# Patient Record
Sex: FEMALE | Race: WHITE | NOT HISPANIC OR LATINO | Employment: FULL TIME | URBAN - NONMETROPOLITAN AREA
[De-identification: names, ages, dates, MRNs, and addresses within clinical notes are randomized per-mention and may not be internally consistent; named-entity substitution may affect disease eponyms.]

---

## 2017-01-03 DIAGNOSIS — R45.4 IRRITABILITY AND ANGER: Primary | ICD-10-CM

## 2017-01-04 RX ORDER — ESCITALOPRAM OXALATE 20 MG/1
TABLET ORAL
Qty: 30 TABLET | Refills: 1 | Status: SHIPPED | OUTPATIENT
Start: 2017-01-04 | End: 2017-04-17

## 2017-01-04 NOTE — TELEPHONE ENCOUNTER
Lexapro     Last Written Prescription Date: 11/15/2016  Last Fill Quantity: 30, # refills: 2  Last Office Visit with [facility] [place], [place] primary care provider:  9/8/2016        Last PHQ-9 score on record=   PHQ-9 SCORE 1/14/2016   Total Score 6

## 2017-01-24 ENCOUNTER — OFFICE VISIT (OUTPATIENT)
Dept: FAMILY MEDICINE | Facility: OTHER | Age: 42
End: 2017-01-24
Attending: FAMILY MEDICINE
Payer: COMMERCIAL

## 2017-01-24 VITALS
DIASTOLIC BLOOD PRESSURE: 88 MMHG | HEART RATE: 62 BPM | HEIGHT: 69 IN | TEMPERATURE: 98.6 F | OXYGEN SATURATION: 99 % | SYSTOLIC BLOOD PRESSURE: 158 MMHG | BODY MASS INDEX: 43.4 KG/M2 | WEIGHT: 293 LBS

## 2017-01-24 DIAGNOSIS — Z13.220 LIPID SCREENING: ICD-10-CM

## 2017-01-24 DIAGNOSIS — E55.9 VITAMIN D DEFICIENCY: ICD-10-CM

## 2017-01-24 DIAGNOSIS — E66.9 OBESITY, UNSPECIFIED OBESITY SEVERITY, UNSPECIFIED OBESITY TYPE: ICD-10-CM

## 2017-01-24 DIAGNOSIS — R05.9 COUGH: ICD-10-CM

## 2017-01-24 DIAGNOSIS — F33.9 EPISODE OF RECURRENT MAJOR DEPRESSIVE DISORDER, UNSPECIFIED DEPRESSION EPISODE SEVERITY (H): ICD-10-CM

## 2017-01-24 DIAGNOSIS — Z13.1 SCREENING FOR DIABETES MELLITUS: ICD-10-CM

## 2017-01-24 DIAGNOSIS — I10 BENIGN ESSENTIAL HYPERTENSION: Primary | ICD-10-CM

## 2017-01-24 LAB
ALBUMIN SERPL-MCNC: 3.5 G/DL (ref 3.4–5)
ALBUMIN UR-MCNC: NEGATIVE MG/DL
ALP SERPL-CCNC: 60 U/L (ref 40–150)
ALT SERPL W P-5'-P-CCNC: 19 U/L (ref 0–50)
ANION GAP SERPL CALCULATED.3IONS-SCNC: 4 MMOL/L (ref 3–14)
APPEARANCE UR: CLEAR
AST SERPL W P-5'-P-CCNC: 12 U/L (ref 0–45)
BILIRUB SERPL-MCNC: 0.3 MG/DL (ref 0.2–1.3)
BILIRUB UR QL STRIP: NEGATIVE
BUN SERPL-MCNC: 9 MG/DL (ref 7–30)
CALCIUM SERPL-MCNC: 8.7 MG/DL (ref 8.5–10.1)
CHLORIDE SERPL-SCNC: 107 MMOL/L (ref 94–109)
CO2 SERPL-SCNC: 26 MMOL/L (ref 20–32)
COLOR UR AUTO: NORMAL
CREAT SERPL-MCNC: 0.53 MG/DL (ref 0.52–1.04)
GFR SERPL CREATININE-BSD FRML MDRD: NORMAL ML/MIN/1.7M2
GLUCOSE SERPL-MCNC: 89 MG/DL (ref 70–99)
GLUCOSE UR STRIP-MCNC: NEGATIVE MG/DL
HGB UR QL STRIP: NEGATIVE
KETONES UR STRIP-MCNC: NEGATIVE MG/DL
LEUKOCYTE ESTERASE UR QL STRIP: NEGATIVE
NITRATE UR QL: NEGATIVE
PH UR STRIP: 7 PH (ref 4.7–8)
POTASSIUM SERPL-SCNC: 3.7 MMOL/L (ref 3.4–5.3)
PROT SERPL-MCNC: 7.5 G/DL (ref 6.8–8.8)
SODIUM SERPL-SCNC: 137 MMOL/L (ref 133–144)
SP GR UR STRIP: 1.01 (ref 1–1.03)
URN SPEC COLLECT METH UR: NORMAL
UROBILINOGEN UR STRIP-MCNC: NORMAL MG/DL (ref 0–2)

## 2017-01-24 PROCEDURE — 81003 URINALYSIS AUTO W/O SCOPE: CPT | Performed by: FAMILY MEDICINE

## 2017-01-24 PROCEDURE — 80053 COMPREHEN METABOLIC PANEL: CPT | Performed by: FAMILY MEDICINE

## 2017-01-24 PROCEDURE — 99214 OFFICE O/P EST MOD 30 MIN: CPT | Performed by: FAMILY MEDICINE

## 2017-01-24 PROCEDURE — 82306 VITAMIN D 25 HYDROXY: CPT | Mod: 90 | Performed by: FAMILY MEDICINE

## 2017-01-24 PROCEDURE — 36415 COLL VENOUS BLD VENIPUNCTURE: CPT | Performed by: FAMILY MEDICINE

## 2017-01-24 PROCEDURE — 99000 SPECIMEN HANDLING OFFICE-LAB: CPT | Performed by: FAMILY MEDICINE

## 2017-01-24 RX ORDER — LOSARTAN POTASSIUM 25 MG/1
25 TABLET ORAL DAILY
Qty: 30 TABLET | Refills: 1 | Status: SHIPPED | OUTPATIENT
Start: 2017-01-24 | End: 2017-04-17

## 2017-01-24 ASSESSMENT — ANXIETY QUESTIONNAIRES
3. WORRYING TOO MUCH ABOUT DIFFERENT THINGS: MORE THAN HALF THE DAYS
7. FEELING AFRAID AS IF SOMETHING AWFUL MIGHT HAPPEN: NOT AT ALL
IF YOU CHECKED OFF ANY PROBLEMS ON THIS QUESTIONNAIRE, HOW DIFFICULT HAVE THESE PROBLEMS MADE IT FOR YOU TO DO YOUR WORK, TAKE CARE OF THINGS AT HOME, OR GET ALONG WITH OTHER PEOPLE: SOMEWHAT DIFFICULT
2. NOT BEING ABLE TO STOP OR CONTROL WORRYING: NOT AT ALL
GAD7 TOTAL SCORE: 5
5. BEING SO RESTLESS THAT IT IS HARD TO SIT STILL: NOT AT ALL
6. BECOMING EASILY ANNOYED OR IRRITABLE: SEVERAL DAYS
1. FEELING NERVOUS, ANXIOUS, OR ON EDGE: NOT AT ALL

## 2017-01-24 ASSESSMENT — PAIN SCALES - GENERAL: PAINLEVEL: NO PAIN (0)

## 2017-01-24 ASSESSMENT — PATIENT HEALTH QUESTIONNAIRE - PHQ9: 5. POOR APPETITE OR OVEREATING: MORE THAN HALF THE DAYS

## 2017-01-24 NOTE — MR AVS SNAPSHOT
After Visit Summary   1/24/2017    Vannessa Molina    MRN: [id number]           Patient Information     Date Of Birth          1975        Visit Information        Provider Department      1/24/2017 1:45 PM Aaliyah Suarez MD [facility]        Today's Diagnoses     Benign essential hypertension    -  1     Obesity, unspecified obesity severity, unspecified obesity type         Lipid screening         Vitamin D deficiency         Episode of recurrent major depressive disorder, unspecified depression episode severity (H)         Cough         Screening for diabetes mellitus           Care Instructions    Stop Lisinopril.  May be causing cough, throat symptoms.  Start Losartan (Cozaar) 25 mg daily.  Continue to monitor BP.  Bring readings in.  Will call with lab results.  Return for fasting labs at your convenience.  Schedule physical within next month - recheck BP, new med, cough, fasting lab review, breast exam, pelvic exam, etc.  Declined flu shot.  Continue to work on diet, exercise, weight loss.  Referral to dietician placed.        Follow-ups after your visit        Additional Services     NUTRITION REFERRAL       Your provider has referred you to: [facility] [phone]   [email or web address]    Please be aware that coverage of these services is subject to the terms and limitations of your health insurance plan.  Call member services at your health plan with any benefit or coverage questions.      Please bring the following with you to your appointment:    (1) This referral request  (2) Any documents given to you regarding this referral  (3) Any specific questions you have about diet and/or food choices                  Future tests that were ordered for you today     Open Future Orders        Priority Expected Expires Ordered    Lipid Profile (Chol, Trig, HDL, LDL calc) Routine  4/24/2017 1/24/2017    Glucose Routine  4/24/2017 1/24/2017     "        Who to contact     If you have questions or need follow up information about today's clinic visit or your schedule please contact [facility] directly at [phone].  Normal or non-critical lab and imaging results will be communicated to you by MyChart, letter or phone within 4 business days after the clinic has received the results. If you do not hear from us within 7 days, please contact the clinic through MyChart or phone. If you have a critical or abnormal lab result, we will notify you by phone as soon as possible.  Submit refill requests through [employer] or call your pharmacy and they will forward the refill request to us. Please allow 3 business days for your refill to be completed.          Additional Information About Your Visit        [employer] Information     [employer] lets you send messages to your doctor, view your test results, renew your prescriptions, schedule appointments and more. To sign up, go to [email or web address] . Click on \"Log in\" on the left side of the screen, which will take you to the Welcome page. Then click on \"Sign up Now\" on the right side of the page.     You will be asked to enter the access code listed below, as well as some personal information. Please follow the directions to create your username and password.     Your access code is: [email or web address]  Expires: 2017  2:07 PM     Your access code will  in 90 days. If you need help or a new code, please call your [facility] clinic or [phone].        Care EveryWhere ID     This is your Care EveryWhere ID. This could be used by other organizations to access your [facility] medical records  [id number]        Your Vitals Were     Pulse Temperature Height BMI (Body Mass Index) Pulse Oximetry       62 98.6  F (37  C) (Tympanic) 5' 8.5\" (1.74 m) 44.65 kg/m2 99%        Blood Pressure from Last 3 Encounters:   17 158/88   16 110/78   11/30/15 130/98    Weight from Last 3 Encounters:   17 " 298 lb (135.172 kg)   01/14/16 278 lb 3.2 oz (126.191 kg)   11/30/15 286 lb 12.8 oz (130.092 kg)              We Performed the Following     Comprehensive metabolic panel     NUTRITION REFERRAL     UA reflex to Microscopic and Culture     Vitamin D Deficiency          Today's Medication Changes          These changes are accurate as of: 1/24/17  2:07 PM.  If you have any questions, ask your nurse or doctor.               Start taking these medicines.        Dose/Directions    losartan 25 MG tablet   Commonly known as:  COZAAR   Used for:  Benign essential hypertension   Started by:  Aaliyah Suarez MD        Dose:  25 mg   Take 1 tablet (25 mg) by mouth daily   Quantity:  30 tablet   Refills:  1         Stop taking these medicines if you haven't already. Please contact your care team if you have questions.     lisinopril 10 MG tablet   Commonly known as:  PRINIVIL/ZESTRIL   Stopped by:  Aaliyah Suarez MD                Where to get your medicines      These medications were [facility] [place] [place] [address]  [address] [facility] [address]     Phone:  [phone]    - losartan 25 MG tablet             Primary Care Provider Office Phone # Fax #    Aaliyah Suarez [phone]        FAMILY [facility] [address] [facility] [address]        Thank you!     Thank you for choosing [facility]  for your care. Our goal is always to provide you with excellent care. Hearing back from our patients is one way we can continue to improve our services. Please take a few minutes to complete the written survey that you may receive in the mail after your visit with us. Thank you!             Your Updated Medication List - Protect others around you: Learn how to safely use, store and throw away your medicines at www.disposemymeds.org.          This list is accurate as of: 1/24/17  2:07 PM.  Always use your most recent med list.                   Brand Name  Dispense Instructions for use    escitalopram 20 MG tablet    LEXAPRO    30 tablet    TAKE 1 TABLET BY MOUTH DAILY       hydrOXYzine 50 MG tablet    ATARAX    30 tablet    Take 1-2 tablets ( mg) by mouth nightly as needed for anxiety or other (insomnia)       losartan 25 MG tablet    COZAAR    30 tablet    Take 1 tablet (25 mg) by mouth daily       vitamin D 1000 UNITS capsule      Take 3 capsules by mouth daily

## 2017-01-24 NOTE — NURSING NOTE
"Chief Complaint   Patient presents with     RECHECK     Flollow up blood pressure.       Initial /88 mmHg  Pulse 62  Temp(Src) 98.6  F (37  C) (Tympanic)  Ht 5' 8.5\" (1.74 m)  Wt 298 lb (135.172 kg)  BMI 44.65 kg/m2  SpO2 99% Estimated body mass index is 44.65 kg/(m^2) as calculated from the following:    Height as of this encounter: 5' 8.5\" (1.74 m).    Weight as of this encounter: 298 lb (135.172 kg).  BP completed using cuff size: Homero Mi LPN      "

## 2017-01-24 NOTE — PROGRESS NOTES
SUBJECTIVE:  Vannessa is a 41 year old female who comes in today for follow up hypertension.  Has been on Lisinopril.  Home readings 127/93, 112/80, 137/85, 127/84, 130/90.  Pulse 60-80.  Has been having a dryness in her throat, dry cough, worse at night.    Patient is up 20 pounds over the past year.  Is trying to start exercising again.  Getting  in 2017.  She is interested in dietary consult.  She does get some dizziness, light headed feeling, with exercise.  No chest pain, dyspnea, palpitations, edema, syncope.   Is overdue for physical.  Declines flu shot.    Current Outpatient Prescriptions   Medication     losartan (COZAAR) 25 MG tablet     escitalopram (LEXAPRO) 20 MG tablet     Cholecalciferol (VITAMIN D) 1000 UNITS capsule     hydrOXYzine (ATARAX) 50 MG tablet     No current facility-administered medications for this visit.        Allergies   Allergen Reactions     Iodine Other (See Comments) and Rash     (shrimp) lips swollen       Past Medical History   Diagnosis Date     Obesity  2011     Major depressive affective disorder, recurrent episode, unspecified 2011     GERD (gastroesophageal reflux diseae) 2011     Vitamin D deficiency 2015     Benign essential hypertension 2015     Past Surgical History   Procedure Laterality Date     Oligohydramnios       Bilateral tubal ligation       Tonsillectomy  2005      section  ,          Family History   Problem Relation Age of Onset     CANCER Mother      Cervical     Depression Father      DIABETES Father      Hypertension Father      Other - See Comments Father      Cholecystectomy/Rheumatoid arthritis     Other - See Comments Sister      Endometriosis     Asthma No family hx of      Thyroid Disease No family hx of      Coronary Artery Disease No family hx of      Social History     Social History     Marital Status: Single     Spouse Name: N/A     Number of Children: N/A     Years of Education: N/A  "    Occupational History     Not on file.     Social History Main Topics     Smoking status: Former Smoker     Types: Cigarettes     Quit date: 11/30/2004     Smokeless tobacco: Never Used      Comment: Tried to quit     Alcohol Use: 0.0 oz/week     0 Standard drinks or equivalent per week      Comment: 4 beers monthly     Drug Use: No     Sexual Activity:     Partners: Male     Other Topics Concern      Service No     Blood Transfusions Yes     Permits if needed     Caffeine Concern Yes     Soda, 32 oz daily     Occupational Exposure No     Hobby Hazards No     Sleep Concern Yes     Stress Concern No     Weight Concern Yes     Special Diet No     Back Care No     Exercise Yes     3 times a week     Seat Belt Yes     Self-Exams Yes     Parent/Sibling W/ Cabg, Mi Or Angioplasty Before 65f 55m? No     Social History Narrative       ROS:  General: positive for weight gain, negative for, fever, chills, weight gain  Skin: negative for, rash  Eyes: negative for, visual blurring, double vision  ENT: positive for as above, sore throat, negative for, postnasal drainage, swollen glands  Resp: Cough- dry, No shortness of breath and No hemoptysis  CV: negative for, palpitations, chest pain, exertional chest pain or pressure, lower extremity edema and syncope or near-syncope  Musculoskeletal: negative for, joint pain and joint swelling  Neurologic: negative for, headaches, local weakness, numbness or tingling of hands and numbness or tingling of feet  PHQ-9 SCORE 1/24/2017   Total Score 4     NIRAJ-7 SCORE 1/24/2017   Total Score 5       OBJECTIVE:  Filed Vitals:    01/24/17 1348   BP: 158/88   Pulse: 62   Temp: 98.6  F (37  C)   TempSrc: Tympanic   Height: 5' 8.5\" (1.74 m)   Weight: 298 lb (135.172 kg)   SpO2: 99%     GENERAL APPEARANCE: alert, no distress, cooperative and over weight  EYES: EOMI, fundi benign- PERRL  HENT: ear canals and TM's normal and nose and mouth without ulcers or lesions  NECK: no adenopathy, no " asymmetry, masses, or scars and thyroid normal to palpation  RESP: lungs clear to auscultation - no rales, rhonchi or wheezes  CV: regular rates and rhythm, normal S1 S2, no S3 or S4 and no murmur, click or rub -  MS: extremities normal- no gross deformities noted, no evidence of inflammation in joints, FROM in all extremities.  PSYCH: mentation appears normal. and affect normal/bright    ASSESSMENT/ORDERS:    ICD-10-CM    1. Benign essential hypertension I10 losartan (COZAAR) 25 MG tablet     Comprehensive metabolic panel     UA reflex to Microscopic and Culture     NUTRITION REFERRAL   2. Obesity, unspecified obesity severity, unspecified obesity type E66.9 NUTRITION REFERRAL   3. Lipid screening Z13.220 Lipid Profile (Chol, Trig, HDL, LDL calc)   4. Vitamin D deficiency E55.9 Vitamin D Deficiency   5. Episode of recurrent major depressive disorder, unspecified depression episode severity (H) F33.9    6. Cough R05    7. Screening for diabetes mellitus Z13.1 Glucose     PLAN:  Patient Instructions   Stop Lisinopril.  May be causing cough, throat symptoms.  Start Losartan (Cozaar) 25 mg daily.  Continue to monitor BP.  Bring readings in.  Will call with lab results.  Return for fasting labs at your convenience.  Schedule physical within next month - recheck BP, new med, cough, fasting lab review, breast exam, pelvic exam, etc.  Declined flu shot.  Continue to work on diet, exercise, weight loss.  Referral to dietician placed.           Aaliyah Sethi

## 2017-01-24 NOTE — PATIENT INSTRUCTIONS
Stop Lisinopril.  May be causing cough, throat symptoms.  Start Losartan (Cozaar) 25 mg daily.  Continue to monitor BP.  Bring readings in.  Will call with lab results.  Return for fasting labs at your convenience.  Schedule physical within next month - recheck BP, new med, cough, fasting lab review, breast exam, pelvic exam, etc.  Declined flu shot.  Continue to work on diet, exercise, weight loss.  Referral to dietician placed.

## 2017-01-25 ASSESSMENT — ANXIETY QUESTIONNAIRES: GAD7 TOTAL SCORE: 5

## 2017-01-25 ASSESSMENT — PATIENT HEALTH QUESTIONNAIRE - PHQ9: SUM OF ALL RESPONSES TO PHQ QUESTIONS 1-9: 4

## 2017-01-26 LAB — DEPRECATED CALCIDIOL+CALCIFEROL SERPL-MC: 23 UG/L (ref 20–75)

## 2017-01-26 RX ORDER — ERGOCALCIFEROL 1.25 MG/1
50000 CAPSULE, LIQUID FILLED ORAL
Qty: 8 CAPSULE | Refills: 0 | Status: SHIPPED | OUTPATIENT
Start: 2017-01-26 | End: 2017-03-17

## 2017-01-30 ENCOUNTER — HOSPITAL ENCOUNTER (OUTPATIENT)
Dept: NUTRITION | Facility: HOSPITAL | Age: 42
End: 2017-01-30
Attending: FAMILY MEDICINE
Payer: COMMERCIAL

## 2017-01-30 DIAGNOSIS — Z13.1 SCREENING FOR DIABETES MELLITUS: ICD-10-CM

## 2017-01-30 DIAGNOSIS — Z13.220 LIPID SCREENING: ICD-10-CM

## 2017-01-30 LAB
CHOLEST SERPL-MCNC: 195 MG/DL
GLUCOSE SERPL-MCNC: 92 MG/DL (ref 70–99)
HDLC SERPL-MCNC: 42 MG/DL
LDLC SERPL CALC-MCNC: 117 MG/DL
NONHDLC SERPL-MCNC: 153 MG/DL
TRIGL SERPL-MCNC: 179 MG/DL

## 2017-01-30 PROCEDURE — 80061 LIPID PANEL: CPT | Performed by: FAMILY MEDICINE

## 2017-01-30 PROCEDURE — 82947 ASSAY GLUCOSE BLOOD QUANT: CPT | Performed by: FAMILY MEDICINE

## 2017-01-30 PROCEDURE — 36415 COLL VENOUS BLD VENIPUNCTURE: CPT | Performed by: FAMILY MEDICINE

## 2017-02-07 ENCOUNTER — HOSPITAL ENCOUNTER (OUTPATIENT)
Dept: NUTRITION | Facility: HOSPITAL | Age: 42
Setting detail: THERAPIES SERIES
End: 2017-02-07
Attending: FAMILY MEDICINE
Payer: COMMERCIAL

## 2017-02-07 DIAGNOSIS — Z12.31 VISIT FOR SCREENING MAMMOGRAM: Primary | ICD-10-CM

## 2017-02-07 PROCEDURE — 77063 BREAST TOMOSYNTHESIS BI: CPT | Mod: TC | Performed by: RADIOLOGY

## 2017-02-07 PROCEDURE — 97802 MEDICAL NUTRITION INDIV IN: CPT | Performed by: DIETITIAN, REGISTERED

## 2017-02-07 PROCEDURE — G0202 SCR MAMMO BI INCL CAD: HCPCS | Mod: TC | Performed by: RADIOLOGY

## 2017-02-07 NOTE — PROGRESS NOTES
"[facility]  Medical Nutrition Therapy    Visit Type: Initial Assessment    Vannessa Molina referred by Dr. Suarez for MNT related to Hyperlipidemia and Obesity    Patient accompanied by self.    Nutrition Assessment:  Anthropometrics  Height: 5'8.5\" BMI:  44.65      Weight: 135.2 kg           Nutrition History   Has worked on losing weight in the past, and did have success with reducing carbohydrate intake.  States her main inspiration is her 2 kids, who she wants to be there for.  Just had cholesterol levels checked and was told to talk to me about this as well as reduced sodium intake    Physical Activity   Has gym membership, but states has not gone consistently in the last 2 years.      Food Record   Doesn't eat breakfast, is often on the go, and tends to eat frozen pizzas/convenience foods.     Nutrition Diagnosis:  Obesity with BMI of 44.65  Hyperlipidemia with  elevated cholesterol levels      Nutrition Intervention:   Education given on High Cholesterol Nutrition Therapy, Cardiac TLC diet    Nutrition Goals:   Will increase fiber intake, start taking flax-seed oil, work on exercising 3x/week, reduce trans/satuated fats, reduce carb intake, eliminate processed foods    Nutrition Follow Up / Monitoring:   Patient to follow-up with RD in 1 month.  Patient has RD contact information to call/email if needed.    Time spent with patient: 45 minutes    Evi Card RD                "

## 2017-04-17 DIAGNOSIS — I10 BENIGN ESSENTIAL HYPERTENSION: ICD-10-CM

## 2017-04-17 DIAGNOSIS — R45.4 IRRITABILITY AND ANGER: ICD-10-CM

## 2017-04-18 RX ORDER — LOSARTAN POTASSIUM 25 MG/1
TABLET ORAL
Qty: 30 TABLET | Refills: 0 | Status: SHIPPED | OUTPATIENT
Start: 2017-04-18 | End: 2017-04-20

## 2017-04-18 RX ORDER — ESCITALOPRAM OXALATE 20 MG/1
TABLET ORAL
Qty: 30 TABLET | Refills: 3 | Status: SHIPPED | OUTPATIENT
Start: 2017-04-18 | End: 2017-04-20

## 2017-04-20 ENCOUNTER — OFFICE VISIT (OUTPATIENT)
Dept: FAMILY MEDICINE | Facility: OTHER | Age: 42
End: 2017-04-20
Attending: FAMILY MEDICINE
Payer: COMMERCIAL

## 2017-04-20 VITALS
TEMPERATURE: 98.3 F | HEART RATE: 80 BPM | OXYGEN SATURATION: 99 % | BODY MASS INDEX: 47.09 KG/M2 | WEIGHT: 293 LBS | DIASTOLIC BLOOD PRESSURE: 84 MMHG | SYSTOLIC BLOOD PRESSURE: 128 MMHG | HEIGHT: 66 IN | RESPIRATION RATE: 16 BRPM

## 2017-04-20 DIAGNOSIS — F33.9 EPISODE OF RECURRENT MAJOR DEPRESSIVE DISORDER, UNSPECIFIED DEPRESSION EPISODE SEVERITY (H): ICD-10-CM

## 2017-04-20 DIAGNOSIS — I10 BENIGN ESSENTIAL HYPERTENSION: ICD-10-CM

## 2017-04-20 DIAGNOSIS — E55.9 VITAMIN D DEFICIENCY: ICD-10-CM

## 2017-04-20 DIAGNOSIS — R06.83 SNORING: ICD-10-CM

## 2017-04-20 DIAGNOSIS — Z00.00 ROUTINE GENERAL MEDICAL EXAMINATION AT A HEALTH CARE FACILITY: Primary | ICD-10-CM

## 2017-04-20 DIAGNOSIS — E66.01 MORBID OBESITY DUE TO EXCESS CALORIES (H): ICD-10-CM

## 2017-04-20 DIAGNOSIS — Z11.3 SCREEN FOR STD (SEXUALLY TRANSMITTED DISEASE): ICD-10-CM

## 2017-04-20 LAB
MICRO REPORT STATUS: NORMAL
SPECIMEN SOURCE: NORMAL
WET PREP SPEC: NORMAL

## 2017-04-20 PROCEDURE — 87491 CHLMYD TRACH DNA AMP PROBE: CPT | Mod: 90 | Performed by: FAMILY MEDICINE

## 2017-04-20 PROCEDURE — 87210 SMEAR WET MOUNT SALINE/INK: CPT | Performed by: FAMILY MEDICINE

## 2017-04-20 PROCEDURE — 87591 N.GONORRHOEAE DNA AMP PROB: CPT | Mod: 90 | Performed by: FAMILY MEDICINE

## 2017-04-20 PROCEDURE — 87624 HPV HI-RISK TYP POOLED RSLT: CPT | Mod: 90 | Performed by: FAMILY MEDICINE

## 2017-04-20 PROCEDURE — G0123 SCREEN CERV/VAG THIN LAYER: HCPCS | Performed by: FAMILY MEDICINE

## 2017-04-20 PROCEDURE — 99396 PREV VISIT EST AGE 40-64: CPT | Performed by: FAMILY MEDICINE

## 2017-04-20 PROCEDURE — 99000 SPECIMEN HANDLING OFFICE-LAB: CPT | Performed by: FAMILY MEDICINE

## 2017-04-20 RX ORDER — LOSARTAN POTASSIUM 25 MG/1
25 TABLET ORAL DAILY
Qty: 30 TABLET | Refills: 3 | Status: SHIPPED | OUTPATIENT
Start: 2017-04-20 | End: 2017-07-11

## 2017-04-20 RX ORDER — BUPROPION HYDROCHLORIDE 150 MG/1
150 TABLET, EXTENDED RELEASE ORAL 2 TIMES DAILY
Qty: 60 TABLET | Refills: 1 | Status: SHIPPED | OUTPATIENT
Start: 2017-04-20 | End: 2017-06-07

## 2017-04-20 RX ORDER — ESCITALOPRAM OXALATE 20 MG/1
20 TABLET ORAL DAILY
Qty: 30 TABLET | Refills: 3 | Status: SHIPPED | OUTPATIENT
Start: 2017-04-20 | End: 2018-05-03

## 2017-04-20 ASSESSMENT — ANXIETY QUESTIONNAIRES
3. WORRYING TOO MUCH ABOUT DIFFERENT THINGS: NEARLY EVERY DAY
2. NOT BEING ABLE TO STOP OR CONTROL WORRYING: NOT AT ALL
6. BECOMING EASILY ANNOYED OR IRRITABLE: SEVERAL DAYS
7. FEELING AFRAID AS IF SOMETHING AWFUL MIGHT HAPPEN: NOT AT ALL
IF YOU CHECKED OFF ANY PROBLEMS ON THIS QUESTIONNAIRE, HOW DIFFICULT HAVE THESE PROBLEMS MADE IT FOR YOU TO DO YOUR WORK, TAKE CARE OF THINGS AT HOME, OR GET ALONG WITH OTHER PEOPLE: SOMEWHAT DIFFICULT
GAD7 TOTAL SCORE: 9
1. FEELING NERVOUS, ANXIOUS, OR ON EDGE: NOT AT ALL
5. BEING SO RESTLESS THAT IT IS HARD TO SIT STILL: MORE THAN HALF THE DAYS

## 2017-04-20 ASSESSMENT — PAIN SCALES - GENERAL: PAINLEVEL: NO PAIN (0)

## 2017-04-20 ASSESSMENT — PATIENT HEALTH QUESTIONNAIRE - PHQ9: 5. POOR APPETITE OR OVEREATING: NEARLY EVERY DAY

## 2017-04-20 NOTE — NURSING NOTE
"Chief Complaint   Patient presents with     Physical       Initial /84 (BP Location: Right arm, Patient Position: Chair, Cuff Size: Adult Large)  Pulse 80  Temp 98.3  F (36.8  C)  Resp 16  Ht 5' 5.5\" (1.664 m)  Wt (!) 302 lb (137 kg)  LMP 03/28/2017 (Exact Date)  SpO2 99%  BMI 49.49 kg/m2 Estimated body mass index is 49.49 kg/(m^2) as calculated from the following:    Height as of this encounter: 5' 5.5\" (1.664 m).    Weight as of this encounter: 302 lb (137 kg).  Medication Reconciliation: complete     Radha Laguerre      "

## 2017-04-20 NOTE — PROGRESS NOTES
SUBJECTIVE:     CC: Vannessa Molina is an 41 year old woman who presents for preventive health visit.     Healthy Habits:    Do you get at least three servings of calcium containing foods daily (dairy, green leafy vegetables, etc.)? yes    Amount of exercise or daily activities, outside of work: 0 hour(s) per day    Problems taking medications regularly No    Medication side effects: Yes hydroxizine made her feel very tired she stopped taking    Have you had an eye exam in the past two years? yes    Do you see a dentist twice per year? yes    Do you have sleep apnea, excessive snoring or daytime drowsiness?yes        Hypertension Follow-up      Outpatient blood pressures are not being checked.    Low Salt Diet: not monitoring salt     Depression Followup    Status since last visit: Worsened - lots of stressors with family, health, etc    See PHQ-9 for current symptoms.  Other associated symptoms: irritability    Complicating factors:   Significant life event:  No   Current substance abuse:  None  Anxiety or Panic symptoms:  No    PHQ-9  English PHQ-9   Any Language          , s/p tubal ligation.  No prior abnormal pap.  Last pap .  Would like std screening.  No symptoms.  History of BV and trichomonas.      Struggling with weight gain.  Admits she is a stress eater and does not exercise.  Has had dietician consult and has all the information she needs, but is having a hard time following through.  Did enroll in a bariatric program years ago, but did not complete.  Is interested in medical management to assist.        Abuse: Current or Past(Physical, Sexual or Emotional)- No  Do you feel safe in your environment - Yes    Social History   Substance Use Topics     Smoking status: Former Smoker     Types: Cigarettes     Quit date: 2004     Smokeless tobacco: Never Used      Comment: Tried to quit     Alcohol use 0.0 oz/week     0 Standard drinks or equivalent per week      Comment: 4 beers monthly     The  patient does not drink >3 drinks per day nor >7 drinks per week.    Recent Labs   Lab Test  17   0956  11/30/15   0853   CHOL  195  187   HDL  42*  38*   LDL  117*  109*   TRIG  179*  199*   NHDL  153*  149*       Reviewed orders with patient.  Reviewed health maintenance and updated orders accordingly - Yes    Mammo Decision Support:  Patient under age 50, mutual decision reflected in health maintenance.      Pertinent mammograms are reviewed under the imaging tab.  History of abnormal Pap smear: NO - age 30-65 PAP every 5 years with negative HPV co-testing recommended    Reviewed and updated as needed this visit by clinical staff  Tobacco  Allergies  Meds  Med Hx  Surg Hx  Fam Hx  Soc Hx        Reviewed and updated as needed this visit by Provider        Past Medical History:   Diagnosis Date     Benign essential hypertension 2015     GERD (gastroesophageal reflux diseae) 2011     Major depressive affective disorder, recurrent episode, unspecified 2011     Obesity  2011     Vitamin D deficiency 2015      Past Surgical History:   Procedure Laterality Date     bilateral tubal ligation        section  ,         oligohydramnios       Tonsillectomy         ROS:  CONSTITUTIONAL:POSITIVE  for weight gain  I: NEGATIVE for worrisome rashes, moles or lesions  E: NEGATIVE for vision changes or irritation  ENT: NEGATIVE for ear, mouth and throat problems  R: NEGATIVE for significant cough or SOB  B: NEGATIVE for masses, tenderness or discharge  CV: NEGATIVE for chest pain, palpitations or peripheral edema  GI: NEGATIVE for nausea, abdominal pain, heartburn, or change in bowel habits  : NEGATIVE for unusual urinary or vaginal symptoms. Periods are regular.  M: NEGATIVE for significant arthralgias or myalgia  N: NEGATIVE for weakness, dizziness or paresthesias  PSYCHIATRIC: POSITIVE fordepressed mood    Problem list, Medication list, Allergies, and  "Medical/Social/Surgical histories reviewed in [facility] and updated as appropriate.  OBJECTIVE:     /84 (BP Location: Right arm, Patient Position: Chair, Cuff Size: Adult Large)  Pulse 80  Temp 98.3  F (36.8  C)  Resp 16  Ht 5' 5.5\" (1.664 m)  Wt (!) 302 lb (137 kg)  LMP 03/28/2017 (Exact Date)  SpO2 99%  BMI 49.49 kg/m2  EXAM:  GENERAL: alert, no distress and obese  EYES: Eyes grossly normal to inspection, PERRL and conjunctivae and sclerae normal  HENT: ear canals and TM's normal, nose and mouth without ulcers or lesions  NECK: no adenopathy, no asymmetry, masses, or scars and thyroid normal to palpation  RESP: lungs clear to auscultation - no rales, rhonchi or wheezes  BREAST: normal without masses, tenderness or nipple discharge and no palpable axillary masses or adenopathy  CV: regular rate and rhythm, normal S1 S2, no S3 or S4, no murmur, click or rub, no peripheral edema and peripheral pulses strong  ABDOMEN: soft, nontender, no hepatosplenomegaly, no masses and bowel sounds normal   (female): normal female external genitalia, normal urethral meatus, vaginal mucosa pink, moist, well rugated, and normal cervix/adnexa/uterus without masses or discharge  MS: no gross musculoskeletal defects noted, no edema  SKIN: no suspicious lesions or rashes  NEURO: Normal strength and tone, mentation intact and speech normal  PSYCH: mentation appears normal, affect normal/bright    ASSESSMENT/PLAN:     1. Routine general medical examination at a health care facility    - A pap thin layer screen with  HPV - recommended age 30 - 65 years (select HPV order below)  - HPV High Risk Types DNA Cervical    2. Morbid obesity due to excess calories (H)    - buPROPion (WELLBUTRIN SR) 150 MG 12 hr tablet; Take 1 tablet (150 mg) by mouth 2 times daily  Dispense: 60 tablet; Refill: 1  - BARIATRIC ADULT REFERRAL    3. Vitamin D deficiency  S/p weekly replacement recently.  Continue daily supplement.    4. Benign essential " "hypertension  Stable.  - losartan (COZAAR) 25 MG tablet; Take 1 tablet (25 mg) by mouth daily  Dispense: 30 tablet; Refill: 3    5. Episode of recurrent major depressive disorder, unspecified depression episode severity (H)  Feels Lexapro does help with mood, irritability.  Wants to continue.  Will add Wellbutrin for mood, and may help with weight as well.  - buPROPion (WELLBUTRIN SR) 150 MG 12 hr tablet; Take 1 tablet (150 mg) by mouth 2 times daily  Dispense: 60 tablet; Refill: 1  - escitalopram (LEXAPRO) 20 MG tablet; Take 1 tablet (20 mg) by mouth daily  Dispense: 30 tablet; Refill: 3    6. Snoring  Possible apnea per significant other.  Has hypertension, which may be affected by possible sleep apnea.    - SLEEP EVALUATION & MANAGEMENT REFERRAL - ADULT; Future    7. Screen for STD (sexually transmitted disease)    - Chlamydia trachomatis PCR  - Neisseria gonorrhoeae PCR  - Wet prep      Labs from 1/2017 reviewed.  Will call with pap and std screening.  Mammogram done.  Referral for sleep apnea evaluation, sleep study.  Referral to [facility] medical weight management clinic.  Medications refilled.  Wellbutrin added to help with mood and possibly appetite/weight.      COUNSELING:   Reviewed preventive health counseling, as reflected in patient instructions  Special attention given to:        Regular exercise       Healthy diet/nutrition       Vision screening       Hearing screening         reports that she quit smoking about 12 years ago. Her smoking use included Cigarettes. She has never used smokeless tobacco.    Estimated body mass index is 49.49 kg/(m^2) as calculated from the following:    Height as of this encounter: 5' 5.5\" (1.664 m).    Weight as of this encounter: 302 lb (137 kg).   Weight management plan: Discussed healthy diet and exercise guidelines and patient will follow up in 12 months in clinic to re-evaluate. Specific weight management program called [facility] discussed and follow up in TBD in " clinic to re-evaluate.    Counseling Resources:  ATP IV Guidelines  Pooled Cohorts Equation Calculator  Breast Cancer Risk Calculator  FRAX Risk Assessment  ICSI Preventive Guidelines  Dietary Guidelines for Americans, 2010  USDA's MyPlate  ASA Prophylaxis  Lung CA Screening    Aaliyah Sethi MD  [facility]

## 2017-04-20 NOTE — PATIENT INSTRUCTIONS
Labs from 1/2017 reviewed.  Will call with pap and std screening.  Mammogram done.  Referral for sleep apnea evaluation, sleep study.  Referral to [facility] weight management clinic.  Medications refilled.  Wellbutrin added to help with mood and possibly appetite/weight.      Preventive Health Recommendations  Female Ages 40 to 49    Yearly exam:     See your health care provider every year in order to  1. Review health changes.   2. Discuss preventive care.    3. Review your medicines if your doctor prescribed any.      Get a Pap test every three years (unless you have an abnormal result and your provider advises testing more often).      If you get Pap tests with HPV test, you only need to test every 5 years, unless you have an abnormal result. You do not need a Pap test if your uterus was removed (hysterectomy) and you have not had cancer.      You should be tested each year for STDs (sexually transmitted diseases), if you're at risk.       Ask your doctor if you should have a mammogram.      Have a colonoscopy (test for colon cancer) if someone in your family has had colon cancer or polyps before age 50.       Have a cholesterol test every 5 years.       Have a diabetes test (fasting glucose) after age 45. If you are at risk for diabetes, you should have this test every 3 years.    Shots: Get a flu shot each year. Get a tetanus shot every 10 years.     Nutrition:     Eat at least 5 servings of fruits and vegetables each day.    Eat whole-grain bread, whole-wheat pasta and brown rice instead of white grains and rice.    Talk to your provider about Calcium and Vitamin D.     Lifestyle    Exercise at least 150 minutes a week (an average of 30 minutes a day, 5 days a week). This will help you control your weight and prevent disease.    Limit alcohol to one drink per day.    No smoking.     Wear sunscreen to prevent skin cancer.    See your dentist every six months for an exam and cleaning.

## 2017-04-20 NOTE — MR AVS SNAPSHOT
After Visit Summary   4/20/2017    Vannessa Molina    MRN: [id number]           Patient Information     Date Of Birth          1975        Visit Information        Provider Department      4/20/2017 8:15 AM Aaliyah Suarez MD [facility]        Today's Diagnoses     Routine general medical examination at a health care facility    -  1    Morbid obesity due to excess calories (H)        Vitamin D deficiency        Benign essential hypertension        Episode of recurrent major depressive disorder, unspecified depression episode severity (H)        Snoring        Screen for STD (sexually transmitted disease)          Care Instructions    Labs from 1/2017 reviewed.  Will call with pap and std screening.  Mammogram done.  Referral for sleep apnea evaluation, sleep study.  Referral to [facility] weight management clinic.  Medications refilled.  Wellbutrin added to help with mood and possibly appetite/weight.      Preventive Health Recommendations  Female Ages 40 to 49    Yearly exam:     See your health care provider every year in order to  1. Review health changes.   2. Discuss preventive care.    3. Review your medicines if your doctor prescribed any.      Get a Pap test every three years (unless you have an abnormal result and your provider advises testing more often).      If you get Pap tests with HPV test, you only need to test every 5 years, unless you have an abnormal result. You do not need a Pap test if your uterus was removed (hysterectomy) and you have not had cancer.      You should be tested each year for STDs (sexually transmitted diseases), if you're at risk.       Ask your doctor if you should have a mammogram.      Have a colonoscopy (test for colon cancer) if someone in your family has had colon cancer or polyps before age 50.       Have a cholesterol test every 5 years.       Have a diabetes test (fasting glucose) after age 45. If you are at risk for diabetes, you  should have this test every 3 years.    Shots: Get a flu shot each year. Get a tetanus shot every 10 years.     Nutrition:     Eat at least 5 servings of fruits and vegetables each day.    Eat whole-grain bread, whole-wheat pasta and brown rice instead of white grains and rice.    Talk to your provider about Calcium and Vitamin D.     Lifestyle    Exercise at least 150 minutes a week (an average of 30 minutes a day, 5 days a week). This will help you control your weight and prevent disease.    Limit alcohol to one drink per day.    No smoking.     Wear sunscreen to prevent skin cancer.    See your dentist every six months for an exam and cleaning.        Follow-ups after your visit        Additional Services     BARIATRIC ADULT REFERRAL       Your provider has referred you to: [facility] - weight management medical management not surgical    Please be aware that coverage of these services is subject to the terms and limitations of your health insurance plan.  Call member services at your health plan with any benefit or coverage questions.      Please bring the following with you to your appointment:      (1) List of current medications   (2) This referral request   (3) Any documents/labs given to you for this referral            SLEEP EVALUATION & MANAGEMENT REFERRAL - ADULT       Please be aware that coverage of these services is subject to the terms and limitations of your health insurance plan.  Call member services at your health plan with any benefit or coverage questions.      Please bring the following to your appointment:    >>   List of current medications   >>   This referral request   >>   Any documents/labs given to you for this referral    Other: dr aragon                  Future tests that were ordered for you today     Open Future Orders        Priority Expected Expires Ordered    SLEEP EVALUATION & MANAGEMENT REFERRAL - ADULT Routine  4/20/2018 4/20/2017            Who to contact     If you  "have questions or need follow up information about today's clinic visit or your schedule please contact [facility] directly at [phone].  Normal or non-critical lab and imaging results will be communicated to you by MyChart, letter or phone within 4 business days after the clinic has received the results. If you do not hear from us within 7 days, please contact the clinic through [email or web address] or phone. If you have a critical or abnormal lab result, we will notify you by phone as soon as possible.  Submit refill requests through [employer] or call your pharmacy and they will forward the refill request to us. Please allow 3 business days for your refill to be completed.          Additional Information About Your Visit        [email or web address] Mind Technology Information     Hyperfair lets you send messages to your doctor, view your test results, renew your prescriptions, schedule appointments and more. To sign up, go to [email or web address] . Click on \"Log in\" on the left side of the screen, which will take you to the Welcome page. Then click on \"Sign up Now\" on the right side of the page.     You will be asked to enter the access code listed below, as well as some personal information. Please follow the directions to create your username and password.     Your access code is: [email or web address]  Expires: 2017  3:07 PM     Your access code will  in 90 days. If you need help or a new code, please call your [facility] clinic or [phone].        Care EveryWhere ID     This is your Care EveryWhere ID. This could be used by other organizations to access your [facility] medical records  [id number]        Your Vitals Were     Pulse Temperature Respirations Height Last Period Pulse Oximetry    80 98.3  F (36.8  C) 16 5' 5.5\" (1.664 m) 2017 (Exact Date) 99%    BMI (Body Mass Index)                   49.49 kg/m2            Blood Pressure from Last 3 Encounters:   17 128/84   17 158/88   16 110/78    Weight from " Last 3 Encounters:   04/20/17 (!) 302 lb (137 kg)   01/24/17 298 lb (135.2 kg)   01/14/16 278 lb 3.2 oz (126.2 kg)              We Performed the Following     A pap thin layer screen with  HPV - recommended age 30 - 65 years (select HPV order below)     BARIATRIC ADULT REFERRAL     Chlamydia trachomatis PCR     HPV High Risk Types DNA Cervical     Neisseria gonorrhoeae PCR     Wet prep          Today's Medication Changes          These changes are accurate as of: 4/20/17  8:48 AM.  If you have any questions, ask your nurse or doctor.               Start taking these medicines.        Dose/Directions    buPROPion 150 MG 12 hr tablet   Commonly known as:  WELLBUTRIN SR   Used for:  Morbid obesity due to excess calories (H), Episode of recurrent major depressive disorder, unspecified depression episode severity (H)   Started by:  Aaliyah Suarez MD        Dose:  150 mg   Take 1 tablet (150 mg) by mouth 2 times daily   Quantity:  60 tablet   Refills:  1         These medicines have changed or have updated prescriptions.        Dose/Directions    escitalopram 20 MG tablet   Commonly known as:  LEXAPRO   This may have changed:  See the new instructions.   Used for:  Episode of recurrent major depressive disorder, unspecified depression episode severity (H)   Changed by:  Aaliyah Suarez MD        Dose:  20 mg   Take 1 tablet (20 mg) by mouth daily   Quantity:  30 tablet   Refills:  3       losartan 25 MG tablet   Commonly known as:  COZAAR   This may have changed:  See the new instructions.   Used for:  Benign essential hypertension   Changed by:  Aaliyah Suarez MD        Dose:  25 mg   Take 1 tablet (25 mg) by mouth daily   Quantity:  30 tablet   Refills:  3            Where to get your medicines      These medications were sent to [facility] [place] - [address]  [address] [place] [address]     Phone:  [phone]     buPROPion 150 MG 12 hr tablet    escitalopram 20 MG  tablet    losartan 25 MG tablet                Primary Care Provider Office Phone # Fax #    Aaliyah Suarez [phone]        FAMILY [facility] [address] [place] [address] [place] [address]        Thank you!     Thank you for choosing [facility]  for your care. Our goal is always to provide you with excellent care. Hearing back from our patients is one way we can continue to improve our services. Please take a few minutes to complete the written survey that you may receive in the mail after your visit with us. Thank you!             Your Updated Medication List - Protect others around you: Learn how to safely use, store and throw away your medicines at www.disposemymeds.org.          This list is accurate as of: 4/20/17  8:48 AM.  Always use your most recent med list.                   Brand Name Dispense Instructions for use    buPROPion 150 MG 12 hr tablet    WELLBUTRIN SR    60 tablet    Take 1 tablet (150 mg) by mouth 2 times daily       escitalopram 20 MG tablet    LEXAPRO    30 tablet    Take 1 tablet (20 mg) by mouth daily       hydrOXYzine 50 MG tablet    ATARAX    30 tablet    Take 1-2 tablets ( mg) by mouth nightly as needed for anxiety or other (insomnia)       losartan 25 MG tablet    COZAAR    30 tablet    Take 1 tablet (25 mg) by mouth daily       vitamin D 1000 UNITS capsule      Take 3 capsules by mouth daily

## 2017-04-21 LAB
C TRACH DNA SPEC QL NAA+PROBE: NORMAL
N GONORRHOEA DNA SPEC QL NAA+PROBE: NORMAL
SPECIMEN SOURCE: NORMAL
SPECIMEN SOURCE: NORMAL

## 2017-04-21 ASSESSMENT — PATIENT HEALTH QUESTIONNAIRE - PHQ9: SUM OF ALL RESPONSES TO PHQ QUESTIONS 1-9: 13

## 2017-04-21 ASSESSMENT — ANXIETY QUESTIONNAIRES: GAD7 TOTAL SCORE: 9

## 2017-04-26 LAB
COPATH REPORT: NORMAL
PAP: NORMAL

## 2017-04-27 LAB
FINAL DIAGNOSIS: NORMAL
HPV HR 12 DNA CVX QL NAA+PROBE: NEGATIVE
HPV16 DNA SPEC QL NAA+PROBE: NEGATIVE
HPV18 DNA SPEC QL NAA+PROBE: NEGATIVE
SPECIMEN DESCRIPTION: NORMAL

## 2017-05-03 ENCOUNTER — OFFICE VISIT (OUTPATIENT)
Dept: SLEEP MEDICINE | Facility: HOSPITAL | Age: 42
End: 2017-05-03
Attending: FAMILY MEDICINE
Payer: COMMERCIAL

## 2017-05-03 VITALS
OXYGEN SATURATION: 98 % | BODY MASS INDEX: 47.09 KG/M2 | DIASTOLIC BLOOD PRESSURE: 86 MMHG | RESPIRATION RATE: 12 BRPM | HEIGHT: 66 IN | SYSTOLIC BLOOD PRESSURE: 124 MMHG | WEIGHT: 293 LBS | HEART RATE: 71 BPM

## 2017-05-03 DIAGNOSIS — R06.83 SNORING: ICD-10-CM

## 2017-05-03 DIAGNOSIS — G47.30 SLEEP APNEA, UNSPECIFIED TYPE: Primary | ICD-10-CM

## 2017-05-03 PROCEDURE — 99241 ZZC OFFICE CONSULTATION,LEVEL I: CPT | Performed by: INTERNAL MEDICINE

## 2017-05-03 PROCEDURE — 99212 OFFICE O/P EST SF 10 MIN: CPT

## 2017-05-03 ASSESSMENT — ENCOUNTER SYMPTOMS
HEADACHES: 0
INSOMNIA: 1
ORTHOPNEA: 0
PND: 0

## 2017-05-03 NOTE — PROGRESS NOTES
"HPI Comments: 40 y/o referred by Dr Suarez with [facility]. Pts figisela notes loud snoring and regular apneas, occ gasps wake her up. Sleep hours are 1130 to 630, some trouble falling asleep. No AM HAs, no RLS. She is very sleepy during the day, has to fight to stay awake. She is significantly overweight, 5 5  300.     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   HENT: Negative for congestion.    Cardiovascular: Negative for orthopnea and PND.   Neurological: Negative for headaches.   Psychiatric/Behavioral: The patient has insomnia.          Physical Exam   Constitutional: She is oriented to person, place, and time.   Cardiovascular: Normal rate.    Pulmonary/Chest: Effort normal.   Neurological: She is alert and oriented to person, place, and time. Gait normal.   Skin: Skin is warm and dry.   Psychiatric: Mood, memory, affect and judgment normal.       /86  Pulse 71  Resp 12  Ht 5' 5.5\" (1.664 m)  Wt (!) 302 lb (137 kg)  LMP 03/28/2017 (Exact Date)  SpO2 98%  BMI 49.49 kg/m2      Current Outpatient Prescriptions:      buPROPion (WELLBUTRIN SR) 150 MG 12 hr tablet, Take 1 tablet (150 mg) by mouth 2 times daily, Disp: 60 tablet, Rfl: 1     escitalopram (LEXAPRO) 20 MG tablet, Take 1 tablet (20 mg) by mouth daily, Disp: 30 tablet, Rfl: 3     losartan (COZAAR) 25 MG tablet, Take 1 tablet (25 mg) by mouth daily, Disp: 30 tablet, Rfl: 3     hydrOXYzine (ATARAX) 50 MG tablet, Take 1-2 tablets ( mg) by mouth nightly as needed for anxiety or other (insomnia) (Patient not taking: Reported on 4/20/2017), Disp: 30 tablet, Rfl: 4     Cholecalciferol (VITAMIN D) 1000 UNITS capsule, Take 3 capsules by mouth daily, Disp: , Rfl:      A/ [facility]  Will order sleep study.  "

## 2017-05-03 NOTE — MR AVS SNAPSHOT
After Visit Summary   5/3/2017    Vannessa Molina    MRN: [id number]           Patient Information     Date Of Birth          1975        Visit Information        Provider Department      5/3/2017 8:30 AM Jackson Gonzales MD [facility]        Today's Diagnoses     Sleep apnea, unspecified type    -  1    Snoring          Care Instructions    In order to help your stay at the Sleep Center to be as comfortable as possible and to obtain the best sleep study possible, the Sleep Center Staff has established the following guidelines:    1.  Please attempt to be here 15 minutes prior to your scheduled appointment time.  If you anticipate being late or you cannot make your overnight appointment, please call us at [phone] or call [phone] and ask for the Sleep Center.  PLEASE MAKE EVERY ATTEMPT TO MAKE YOUR APPOINTMENT.  A SLEEP TECHNICIAN AND A SLEEP ROOM HAS BEEN RESERVED JUST FOR YOU.    2. When you arrive at [facility], please park in the upper lot, by [address].  Enter the hospital at the Emergency Room Entrance.  Follow the signs to the Emergency Room Admitting Desk and tell them you are here for a sleep study in the Sleep Disorder Center.    3. Do not stop any medications, unless specifically requested.  Be sure to bring all medications that you need with you.    4. Do not use any hair creams or gels, moisturizers, rinses or sprays the day of your study.  FOR MALES:  if you are usually clean shaven, please shave before you come in; FOR FEMALES:  do not wear make-up or be prepared to remove it.  This will improve the quality of the study.    5. Please DO NOT USE CAFFEINE OR ALCOHOL after 2:00 PM the day of your test unless advised otherwise.    6. Do not take any naps the day of your test.    7. Attachment of monitoring equipment will take approximately one hour, including an explanation of the test.    8. Bring comfortable night clothes to sleep in, two-piece, cotton pajamas or  shorts are best.    9. If you have a pillow that you prefer using, please bring it with you; but do not forget to take it home with you in the morning.    10. Most of our studies are complete by approximately 7:00 AM.  In most instances we may wake you during your normal wake time or the time you request to be awakened.    If you have any special needs or questions related to this information or your test, please feel free to call the Sleep Disorder Center at [phone] or [phone] and ask for the Sleep Disorder Center.  Please make every effort to keep your appointment.  A sleep technician and a sleep room have been reserved just for you.  In the event that you are unable to make your appointment, please call as soon as possible to notify us of your cancellation.          Follow-ups after your visit        Your next 10 appointments already scheduled     May 09, 2017  7:30 PM CDT   PSG Split with [facility] RM1   [facility] ([facility] )    [address] [place] [address]   [phone]              Future tests that were ordered for you today     Open Future Orders        Priority Expected Expires Ordered    Comprehensive Sleep Study Routine  10/30/2017 5/3/2017            Who to contact     If you have questions or need follow up information about today's clinic visit or your schedule please contact [facility] directly at [phone].  Normal or non-critical lab and imaging results will be communicated to you by MyChart, letter or phone within 4 business days after the clinic has received the results. If you do not hear from us within 7 days, please contact the clinic through MyChart or phone. If you have a critical or abnormal lab result, we will notify you by phone as soon as possible.  Submit refill requests through [facility] or call your pharmacy and they will forward the refill request to us. Please allow 3 business days for your refill to be completed.          Additional Information  "About Your Visit        Empress[employer] Information     [employer] lets you send messages to your doctor, view your test results, renew your prescriptions, schedule appointments and more. To sign up, go to [email or web address] . Click on \"Log in\" on the left side of the screen, which will take you to the Welcome page. Then click on \"Sign up Now\" on the right side of the page.     You will be asked to enter the access code listed below, as well as some personal information. Please follow the directions to create your username and password.     Your access code is: [email or web address]  Expires: 2017  8:37 AM     Your access code will  in 90 days. If you need help or a new code, please call your [facility] clinic or [phone].        Care EveryWhere ID     This is your Care EveryWhere ID. This could be used by other organizations to access your [facility] medical records  [id number]        Your Vitals Were     Pulse Respirations Height Last Period Pulse Oximetry BMI (Body Mass Index)    71 12 5' 5.5\" (1.664 m) 2017 (Exact Date) 98% 49.49 kg/m2       Blood Pressure from Last 3 Encounters:   17 124/86   17 128/84   17 158/88    Weight from Last 3 Encounters:   17 (!) 302 lb (137 kg)   17 (!) 302 lb (137 kg)   17 298 lb (135.2 kg)              We Performed the Following     SLEEP EVALUATION & MANAGEMENT REFERRAL - ADULT        Primary Care Provider Office Phone # Fax #    Aaliyah Suarez [phone]        FAMILY [address] [place]  [facility] [address]        Thank you!     Thank you for choosing [facility]  for your care. Our goal is always to provide you with excellent care. Hearing back from our patients is one way we can continue to improve our services. Please take a few minutes to complete the written survey that you may receive in the mail after your visit with us. Thank you!             Your Updated Medication List - Protect others around you: " Learn how to safely use, store and throw away your medicines at www.disposemymeds.org.          This list is accurate as of: 5/3/17 10:56 AM.  Always use your most recent med list.                   Brand Name Dispense Instructions for use    buPROPion 150 MG 12 hr tablet    WELLBUTRIN SR    60 tablet    Take 1 tablet (150 mg) by mouth 2 times daily       escitalopram 20 MG tablet    LEXAPRO    30 tablet    Take 1 tablet (20 mg) by mouth daily       hydrOXYzine 50 MG tablet    ATARAX    30 tablet    Take 1-2 tablets ( mg) by mouth nightly as needed for anxiety or other (insomnia)       losartan 25 MG tablet    COZAAR    30 tablet    Take 1 tablet (25 mg) by mouth daily       vitamin D 1000 UNITS capsule      Take 3 capsules by mouth daily

## 2017-05-03 NOTE — NURSING NOTE
Patient ID checked with name and date of birth. Reviewed allergies and home medications. Took Vitals and brief history.   Scheduled patient for an over night sleep test and reviewed instructions patient had good understnding.

## 2017-05-03 NOTE — PATIENT INSTRUCTIONS
In order to help your stay at the Sleep Center to be as comfortable as possible and to obtain the best sleep study possible, the Sleep Center Staff has established the following guidelines:    1.  Please attempt to be here 15 minutes prior to your scheduled appointment time.  If you anticipate being late or you cannot make your overnight appointment, please call us at [phone] or call [phone] and ask for the Sleep Center.  PLEASE MAKE EVERY ATTEMPT TO MAKE YOUR APPOINTMENT.  A SLEEP TECHNICIAN AND A SLEEP ROOM HAS BEEN RESERVED JUST FOR YOU.    2. When you arrive at [facility], please park in the upper lot, by [address].  Enter the hospital at the Emergency Room Entrance.  Follow the signs to the Emergency Room Admitting Desk and tell them you are here for a sleep study in the Sleep Disorder Center.    3. Do not stop any medications, unless specifically requested.  Be sure to bring all medications that you need with you.    4. Do not use any hair creams or gels, moisturizers, rinses or sprays the day of your study.  FOR MALES:  if you are usually clean shaven, please shave before you come in; FOR FEMALES:  do not wear make-up or be prepared to remove it.  This will improve the quality of the study.    5. Please DO NOT USE CAFFEINE OR ALCOHOL after 2:00 PM the day of your test unless advised otherwise.    6. Do not take any naps the day of your test.    7. Attachment of monitoring equipment will take approximately one hour, including an explanation of the test.    8. Bring comfortable night clothes to sleep in, two-piece, cotton pajamas or shorts are best.    9. If you have a pillow that you prefer using, please bring it with you; but do not forget to take it home with you in the morning.    10. Most of our studies are complete by approximately 7:00 AM.  In most instances we may wake you during your normal wake time or the time you request to be awakened.    If you have any special needs or  questions related to this information or your test, please feel free to call the Sleep Disorder Center at [phone] or [phone] and ask for the Sleep Disorder Center.  Please make every effort to keep your appointment.  A sleep technician and a sleep room have been reserved just for you.  In the event that you are unable to make your appointment, please call as soon as possible to notify us of your cancellation.

## 2017-05-09 ENCOUNTER — THERAPY VISIT (OUTPATIENT)
Dept: SLEEP MEDICINE | Facility: HOSPITAL | Age: 42
End: 2017-05-09
Attending: INTERNAL MEDICINE
Payer: COMMERCIAL

## 2017-05-09 DIAGNOSIS — G47.30 SLEEP APNEA, UNSPECIFIED TYPE: ICD-10-CM

## 2017-05-09 PROCEDURE — 95810 POLYSOM 6/> YRS 4/> PARAM: CPT

## 2017-05-09 PROCEDURE — 95810 POLYSOM 6/> YRS 4/> PARAM: CPT | Mod: 26 | Performed by: INTERNAL MEDICINE

## 2017-05-09 NOTE — MR AVS SNAPSHOT
"              After Visit Summary   2017    Vannessa Molina    MRN: [id number]           Patient Information     Date Of Birth          1975        Visit Information        Provider Department      2017 7:30 PM [facility]        Today's Diagnoses     Sleep apnea, unspecified type           Follow-ups after your visit        Who to contact     If you have questions or need follow up information about today's clinic visit or your schedule please contact [facility] directly at [phone].  Normal or non-critical lab and imaging results will be communicated to you by MyChart, letter or phone within 4 business days after the clinic has received the results. If you do not hear from us within 7 days, please contact the clinic through [employer] or phone. If you have a critical or abnormal lab result, we will notify you by phone as soon as possible.  Submit refill requests through [employer] or call your pharmacy and they will forward the refill request to us. Please allow 3 business days for your refill to be completed.          Additional Information About Your Visit        [employer] Information     [employer] lets you send messages to your doctor, view your test results, renew your prescriptions, schedule appointments and more. To sign up, go to [email or web address] . Click on \"Log in\" on the left side of the screen, which will take you to the Welcome page. Then click on \"Sign up Now\" on the right side of the page.     You will be asked to enter the access code listed below, as well as some personal information. Please follow the directions to create your username and password.     Your access code is: [email or web address]  Expires: 2017  8:37 AM     Your access code will  in 90 days. If you need help or a new code, please call your [facility] clinic or [phone].        Care EveryWhere ID     This is your Care EveryWhere ID. This could be used by other organizations to access your [facility] " medical records  [id number]        Your Vitals Were     Last Period                   03/28/2017 (Exact Date)            Blood Pressure from Last 3 Encounters:   05/03/17 124/86   04/20/17 128/84   01/24/17 158/88    Weight from Last 3 Encounters:   05/03/17 (!) 302 lb (137 kg)   04/20/17 (!) 302 lb (137 kg)   01/24/17 298 lb (135.2 kg)              We Performed the Following     Comprehensive Sleep Study        Primary Care Provider Office Phone # Fax #    Aaliyah Suarez [phone]        FAMILY [facility] [address] [place] [address] GABRIELA  [facility] [address]        Thank you!     Thank you for choosing [facility]  for your care. Our goal is always to provide you with excellent care. Hearing back from our patients is one way we can continue to improve our services. Please take a few minutes to complete the written survey that you may receive in the mail after your visit with us. Thank you!             Your Updated Medication List - Protect others around you: Learn how to safely use, store and throw away your medicines at www.disposemymeds.org.          This list is accurate as of: 5/9/17 11:59 PM.  Always use your most recent med list.                   Brand Name Dispense Instructions for use    buPROPion 150 MG 12 hr tablet    WELLBUTRIN SR    60 tablet    Take 1 tablet (150 mg) by mouth 2 times daily       escitalopram 20 MG tablet    LEXAPRO    30 tablet    Take 1 tablet (20 mg) by mouth daily       hydrOXYzine 50 MG tablet    ATARAX    30 tablet    Take 1-2 tablets ( mg) by mouth nightly as needed for anxiety or other (insomnia)       losartan 25 MG tablet    COZAAR    30 tablet    Take 1 tablet (25 mg) by mouth daily       vitamin D 1000 UNITS capsule      Take 3 capsules by mouth daily

## 2017-05-09 NOTE — LETTER
Vannessa Molina  [address] [place] [address]    May 10, 2017         Dear Vannessa      I recently read your sleep study, you do have sleep apnea stopping or slowing your breathing  about 10 times per hour.     This is likely disturbing your sleep and making you feel tired during the day. I think we should try you on an automatically adjusting CPAP mask , hopefully it will make you feel more rested during the day and may help protect you from future health problems.     I will ask our sleep lab staff to set up a trial of the mask, if you have any problems or concerns please give us a call.     I'll plan to see you in follow up in the future and I hope it helps.                                                                                       Sincerely,          Jackson Gonzales MD, D,[facility]           [address] [place] [address]  [phone]

## 2017-05-10 NOTE — PROGRESS NOTES
Patient is a 40 y/o female in with c/o loud snoring, observed apnea and EDS.  Moderate to loud snoring with associated obstructive respiratory events noted, predominantly during REM stage.  Patient was not attempted on CPAP as technologist did not feel that patient qualified early enough in study.  Patient tolerated study well.

## 2017-05-10 NOTE — PROGRESS NOTES
42 y/o referred by Dr Suarez for excessive daytime somnolence.   Overnight 18 channel polysomnography was done 5/9/17, I reviewed the raw data in detail.Please see scanned sleep study for full results.Sleep efficiency was normal  with a normal  sleep latency and a normal REM latency. Sleep architecture shows all stages seen in usual amounts for age. Baseline oxyhemoglobin saturation was 96%. The ECG was monitored and no arrhythmias were seen. There were no significant periodic leg movements noted.              Technician noted loud snoring. We measured 28 apneas and 23 hypopneas early in the study. These events were associated with EEG arousals and desats down to 84%. The apnea hypopnea index was 7.2 events per hour. There was not enough time to do a cpap titration.               Impression: TROY  autoPap titration.

## 2017-05-10 NOTE — PROGRESS NOTES
The Pt was identified by name and  as well as wristband.  She is a 42 yo  female with c/o EDS and snoring.  Her fiance reports loud snoring and apneas.  Her ESS=14.  Sleep testing and possible findings were discussed during hook up.  TROY and CPAP therapy were explained as she was fitted with a ResMed Airfit N20 medium mask.  Sleep onset was delayed.  Once she consolidated sleep, there was near constant light to moderate snoring with snore arousals.  REM onset was also delayed.  In REM she had short apneas with frequent arousals and mild desats.  The SpO2 baseline in sleep was 95%.  The lowest SpO2 was 84% seen following apneas in 4 epochs of REM.  No PLMs or arrhythmias were noted.  CPAP was not attempted as she did not appear to meet criteria early enough.  The preliminary results were discussed.  She reports sleeping about normal, better than she expected.  She was told to expect a CPAP trial.  After reviewing the vendor choices,she prefers [employer] [facility] for her DME.

## 2017-05-11 DIAGNOSIS — G47.33 OBSTRUCTIVE SLEEP APNEA SYNDROME: Primary | ICD-10-CM

## 2017-06-07 DIAGNOSIS — E66.01 MORBID OBESITY DUE TO EXCESS CALORIES (H): ICD-10-CM

## 2017-06-07 DIAGNOSIS — F33.9 EPISODE OF RECURRENT MAJOR DEPRESSIVE DISORDER, UNSPECIFIED DEPRESSION EPISODE SEVERITY (H): ICD-10-CM

## 2017-06-07 RX ORDER — BUPROPION HYDROCHLORIDE 150 MG/1
150 TABLET, EXTENDED RELEASE ORAL 2 TIMES DAILY
Qty: 60 TABLET | Refills: 3 | Status: SHIPPED | OUTPATIENT
Start: 2017-06-07 | End: 2017-09-27

## 2017-07-11 DIAGNOSIS — I10 BENIGN ESSENTIAL HYPERTENSION: ICD-10-CM

## 2017-07-13 RX ORDER — LOSARTAN POTASSIUM 25 MG/1
TABLET ORAL
Qty: 30 TABLET | Refills: 8 | Status: SHIPPED | OUTPATIENT
Start: 2017-07-13 | End: 2017-09-27

## 2017-08-10 ENCOUNTER — HOSPITAL ENCOUNTER (EMERGENCY)
Facility: HOSPITAL | Age: 42
Discharge: HOME | End: 2017-08-10
Attending: PHYSICIAN ASSISTANT | Admitting: PHYSICIAN ASSISTANT
Payer: COMMERCIAL

## 2017-08-10 VITALS
SYSTOLIC BLOOD PRESSURE: 128 MMHG | OXYGEN SATURATION: 100 % | WEIGHT: 293 LBS | RESPIRATION RATE: 18 BRPM | TEMPERATURE: 99.2 F | HEART RATE: 77 BPM | DIASTOLIC BLOOD PRESSURE: 85 MMHG | BODY MASS INDEX: 49.16 KG/M2

## 2017-08-10 DIAGNOSIS — S90.32XA CONTUSION OF LEFT FOOT, INITIAL ENCOUNTER: ICD-10-CM

## 2017-08-10 PROCEDURE — 73630 X-RAY EXAM OF FOOT: CPT | Mod: TC,LT

## 2017-08-10 PROCEDURE — 99213 OFFICE O/P EST LOW 20 MIN: CPT

## 2017-08-10 PROCEDURE — 99213 OFFICE O/P EST LOW 20 MIN: CPT | Performed by: PHYSICIAN ASSISTANT

## 2017-08-10 ASSESSMENT — ENCOUNTER SYMPTOMS
PSYCHIATRIC NEGATIVE: 1
CARDIOVASCULAR NEGATIVE: 1
ARTHRALGIAS: 1
CONSTITUTIONAL NEGATIVE: 1

## 2017-08-10 NOTE — ED AVS SNAPSHOT
[facility]    [address] [address]    Phone:  [phone]                                       Vannessa Molina   MRN: [id number]    Department:  [facility]   Date of Visit:  8/10/2017           After Visit Summary Signature Page     I have received my discharge instructions, and my questions have been answered. I have discussed any challenges I see with this plan with the nurse or doctor.    ..........................................................................................................................................  Patient/Patient Representative Signature      ..........................................................................................................................................  Patient Representative Print Name and Relationship to Patient    ..................................................               ................................................  Date                                            Time    ..........................................................................................................................................  Reviewed by Signature/Title    ...................................................              ..............................................  Date                                                            Time

## 2017-08-10 NOTE — ED AVS SNAPSHOT
[facility]    [address] [place] [address]    Phone:  [phone]                                       Vannessa Molina   MRN: [id number]    Department:  [facility]   Date of Visit:  8/10/2017           Patient Information     Date Of Birth          1975        Your diagnoses for this visit were:     Contusion of left foot, initial encounter        You were seen by Magaly Gerardo PA.      Follow-up Information     Follow up with Aaliyah Suarez MD.    Specialty:  Family Practice    Why:  If symptoms worsen    Contact information:    [facility]  [address]  [place] [address] [phone]          Follow up with [facility].    Specialty:  EMERGENCY MEDICINE    Why:  If further concerns develop    Contact information:    [address] [place] [address] [phone]    Additional information:    From [facility]: Take [address]. Turn left at [address]. Turn left at the first stoplight on [address] [place] [address]. At the first stop sign, take a right onto [address]. Take a left into the parking lot and continue through until you reach the North enterance of the building.       From [place]: Take [address]. Take the [address] ramp towards [place]. Turn left onto [address]. Take a slight right onto [address]. Turn left at the first stoplight on [address]. At the first stop sign, take a right onto [address]. Take a left into the parking lot and continue through until you reach the North enterance of the building.       From [place]: Take [address]. Take a right at [address]. At the first stop sign, take a right onto [address]. Take a left into the parking lot and continue through until you reach the North enterance of the building.       Discharge References/Attachments     FOOT CONTUSION (ENGLISH)         Review of your medicines      Our  "records show that you are taking the medicines listed below. If these are incorrect, please call your family doctor or clinic.        Dose / Directions Last dose taken    buPROPion 150 MG 12 hr tablet   Commonly known as:  WELLBUTRIN SR   Dose:  150 mg   Quantity:  60 tablet        Take 1 tablet (150 mg) by mouth 2 times daily   Refills:  3        escitalopram 20 MG tablet   Commonly known as:  LEXAPRO   Dose:  20 mg   Quantity:  30 tablet        Take 1 tablet (20 mg) by mouth daily   Refills:  3        losartan 25 MG tablet   Commonly known as:  COZAAR   Quantity:  30 tablet        TAKE 1 TABLET BY MOUTH DAILY   Refills:  8        vitamin D 1000 UNITS capsule   Dose:  3 capsule        Take 3 capsules by mouth daily   Refills:  0                Procedures and tests performed during your visit     Foot XR, G/E 3 views, left      Orders Needing Specimen Collection     None      Pending Results     Date and Time Order Name Status Description    8/10/2017 2004 Foot XR, G/E 3 views, left In process             Pending Culture Results     No orders found from 8/8/2017 to 8/11/2017.            Thank you for choosing [facility]       Thank you for choosing [facility] for your care. Our goal is always to provide you with excellent care. Hearing back from our patients is one way we can continue to improve our services. Please take a few minutes to complete the written survey that you may receive in the mail after you visit with us. Thank you!        [email or web address] Information     [email or web address] lets you send messages to your doctor, view your test results, renew your prescriptions, schedule appointments and more. To sign up, go to [email or web address] . Click on \"Log in\" on the left side of the screen, which will take you to the Welcome page. Then click on \"Sign up Now\" on the right side of the page.     You will be asked to enter the access code listed below, as well as some personal information. Please follow the directions to create " your username and password.     Your access code is: [email or web address]  Expires: 2017  8:32 PM     Your access code will  in 90 days. If you need help or a new code, please call your [facility] clinic or [phone].        Care EveryWhere ID     This is your Care EveryWhere ID. This could be used by other organizations to access your [facility] medical records  [id number]        Equal Access to Services     [facility] HospitalKENZIE : Hadii braxton donald hadasho Soomaali, waaxda luqadaha, qaybta kaalmada adeegyada, brunilda rehman . So [facility] [phone].    ATENCIÓN: Si habla español, tiene a brown disposición servicios gratuitos de asistencia lingüística. Llame al [phone].    We comply with applicable federal civil rights laws and [place] laws. We do not discriminate on the basis of race, color, national origin, age, disability sex, sexual orientation or gender identity.            After Visit Summary       This is your record. Keep this with you and show to your community pharmacist(s) and doctor(s) at your next visit.

## 2017-08-11 NOTE — ED PROVIDER NOTES
"  History     Chief Complaint   Patient presents with     Foot Pain     \"for 5 days, kicked heater pipe\"     The history is provided by the patient. No  was used.     Vannessa Molina is a 42 year old female who has 5 days of left foot pain. States she kicked a heater pipe. Has been walking on the side of the foot due to the pain on the medial side. Denies any other injury at this time. No fall or LOC.     I have reviewed the Medications, Allergies, Past Medical and Surgical History, and Social History in the Epic system.    Allergies:   Allergies   Allergen Reactions     Iodine Other (See Comments) and Rash     (shrimp) lips swollen         No current facility-administered medications on file prior to encounter.   Current Outpatient Prescriptions on File Prior to Encounter:  losartan (COZAAR) 25 MG tablet TAKE 1 TABLET BY MOUTH DAILY   buPROPion (WELLBUTRIN SR) 150 MG 12 hr tablet Take 1 tablet (150 mg) by mouth 2 times daily   escitalopram (LEXAPRO) 20 MG tablet Take 1 tablet (20 mg) by mouth daily   Cholecalciferol (VITAMIN D) 1000 UNITS capsule Take 3 capsules by mouth daily       Patient Active Problem List   Diagnosis     Obesity     Major depressive disorder, recurrent episode (H)     GERD (gastroesophageal reflux diseae)     Benign essential hypertension     Vitamin D deficiency       Past Surgical History:   Procedure Laterality Date     bilateral tubal ligation        section  ,         oligohydramnios       Tonsillectomy         Social History   Substance Use Topics     Smoking status: Former Smoker     Types: Cigarettes     Quit date: 2004     Smokeless tobacco: Never Used      Comment: Tried to quit     Alcohol use 0.0 oz/week     0 Standard drinks or equivalent per week      Comment: 4 beers monthly       Most Recent Immunizations   Administered Date(s) Administered     TDAP Vaccine (Boostrix) 2016       BMI: Estimated body mass index is 49.16 " "kg/(m^2) as calculated from the following:    Height as of 5/3/17: 1.664 m (5' 5.5\").    Weight as of this encounter: 136.1 kg (300 lb).      Review of Systems   Constitutional: Negative.    HENT: Negative.    Cardiovascular: Negative.    Musculoskeletal: Positive for arthralgias and gait problem.   Psychiatric/Behavioral: Negative.        Physical Exam   BP: 128/85  Pulse: 77  Temp: 99.2  F (37.3  C)  Resp: 18  Weight: 136.1 kg (300 lb)  SpO2: 100 %  Physical Exam   Constitutional: She is oriented to person, place, and time. She appears well-developed and well-nourished. No distress.   Cardiovascular: Normal rate.    Pulmonary/Chest: Effort normal.   Musculoskeletal:   Left foot: moderate TTP to 1-2 MT and MTP joints. Mild edema to the area. No erythema. Minimal ecchymosis. M/n/v intact. +AFROM.  4/5 strength due to pain   Neurological: She is alert and oriented to person, place, and time.   Skin: She is not diaphoretic.   Psychiatric: She has a normal mood and affect.   Nursing note and vitals reviewed.      ED Course     ED Course     Procedures        Left foot xray: no acute process noted. Pending official rad results    Post OP shoe applied. Pt had immediate decrease in pain when standing.    Assessments & Plan (with Medical Decision Making)     I have reviewed the nursing notes.    I have reviewed the findings, diagnosis, plan and need for follow up with the patient.  Final diagnoses:   Contusion of left foot, initial encounter         Wear Post OP shoe as needed for pain/comfort  Patient verbally educated and given appropriate education sheets for the diagnoses and has no questions.  Take motrin as directed on the bottle as needed for pain/swelling.  Follow up with your Primary Care provider if symptoms increase or if concerns develop, return to the ER  Magaly Gerardo Certified  Physician Assistant  8/10/2017  11:04 PM  URGENT CARE CLINIC      8/10/2017   [facility]     Magaly Gerardo, " PA  08/10/17 2412

## 2017-08-11 NOTE — ED NOTES
Pt is here alone. She states that 6 days she kicked a pipe coming out of her wall with her left foot hurting the ball of her foot and her great toe. She states that the pain has not gone away since. No bruising or swelling noted. Patient ambulated by herself into urgent care.

## 2017-08-31 ENCOUNTER — TELEPHONE (OUTPATIENT)
Dept: FAMILY MEDICINE | Facility: OTHER | Age: 42
End: 2017-08-31

## 2017-09-20 ENCOUNTER — TELEPHONE (OUTPATIENT)
Dept: FAMILY MEDICINE | Facility: OTHER | Age: 42
End: 2017-09-20

## 2017-09-20 ASSESSMENT — ANXIETY QUESTIONNAIRES
4. TROUBLE RELAXING: NOT AT ALL
3. WORRYING TOO MUCH ABOUT DIFFERENT THINGS: NOT AT ALL
6. BECOMING EASILY ANNOYED OR IRRITABLE: SEVERAL DAYS
IF YOU CHECKED OFF ANY PROBLEMS ON THIS QUESTIONNAIRE, HOW DIFFICULT HAVE THESE PROBLEMS MADE IT FOR YOU TO DO YOUR WORK, TAKE CARE OF THINGS AT HOME, OR GET ALONG WITH OTHER PEOPLE: NOT DIFFICULT AT ALL
GAD7 TOTAL SCORE: 2
2. NOT BEING ABLE TO STOP OR CONTROL WORRYING: NOT AT ALL
1. FEELING NERVOUS, ANXIOUS, OR ON EDGE: SEVERAL DAYS
7. FEELING AFRAID AS IF SOMETHING AWFUL MIGHT HAPPEN: NOT AT ALL
5. BEING SO RESTLESS THAT IT IS HARD TO SIT STILL: NOT AT ALL

## 2017-09-20 ASSESSMENT — PATIENT HEALTH QUESTIONNAIRE - PHQ9: SUM OF ALL RESPONSES TO PHQ QUESTIONS 1-9: 8

## 2017-09-21 ASSESSMENT — ANXIETY QUESTIONNAIRES: GAD7 TOTAL SCORE: 2

## 2017-09-27 ENCOUNTER — OFFICE VISIT (OUTPATIENT)
Dept: FAMILY MEDICINE | Facility: OTHER | Age: 42
End: 2017-09-27
Attending: FAMILY MEDICINE
Payer: COMMERCIAL

## 2017-09-27 VITALS
BODY MASS INDEX: 49.98 KG/M2 | HEART RATE: 86 BPM | OXYGEN SATURATION: 95 % | TEMPERATURE: 98.2 F | SYSTOLIC BLOOD PRESSURE: 142 MMHG | DIASTOLIC BLOOD PRESSURE: 94 MMHG | WEIGHT: 293 LBS

## 2017-09-27 DIAGNOSIS — E66.9 OBESITY, UNSPECIFIED OBESITY SEVERITY, UNSPECIFIED OBESITY TYPE: ICD-10-CM

## 2017-09-27 DIAGNOSIS — I10 BENIGN ESSENTIAL HYPERTENSION: ICD-10-CM

## 2017-09-27 DIAGNOSIS — F33.9 EPISODE OF RECURRENT MAJOR DEPRESSIVE DISORDER, UNSPECIFIED DEPRESSION EPISODE SEVERITY (H): Primary | ICD-10-CM

## 2017-09-27 PROCEDURE — 99213 OFFICE O/P EST LOW 20 MIN: CPT | Performed by: FAMILY MEDICINE

## 2017-09-27 RX ORDER — LOSARTAN POTASSIUM 50 MG/1
25 TABLET ORAL DAILY
Qty: 30 TABLET | Refills: 1 | Status: SHIPPED | OUTPATIENT
Start: 2017-09-27 | End: 2017-11-07 | Stop reason: DRUGHIGH

## 2017-09-27 ASSESSMENT — PATIENT HEALTH QUESTIONNAIRE - PHQ9: SUM OF ALL RESPONSES TO PHQ QUESTIONS 1-9: 11

## 2017-09-27 ASSESSMENT — ANXIETY QUESTIONNAIRES
4. TROUBLE RELAXING: NOT AT ALL
3. WORRYING TOO MUCH ABOUT DIFFERENT THINGS: SEVERAL DAYS
1. FEELING NERVOUS, ANXIOUS, OR ON EDGE: NOT AT ALL
IF YOU CHECKED OFF ANY PROBLEMS ON THIS QUESTIONNAIRE, HOW DIFFICULT HAVE THESE PROBLEMS MADE IT FOR YOU TO DO YOUR WORK, TAKE CARE OF THINGS AT HOME, OR GET ALONG WITH OTHER PEOPLE: SOMEWHAT DIFFICULT
7. FEELING AFRAID AS IF SOMETHING AWFUL MIGHT HAPPEN: SEVERAL DAYS
6. BECOMING EASILY ANNOYED OR IRRITABLE: SEVERAL DAYS
5. BEING SO RESTLESS THAT IT IS HARD TO SIT STILL: NOT AT ALL
2. NOT BEING ABLE TO STOP OR CONTROL WORRYING: NOT AT ALL
GAD7 TOTAL SCORE: 3

## 2017-09-27 ASSESSMENT — PAIN SCALES - GENERAL: PAINLEVEL: NO PAIN (0)

## 2017-09-27 NOTE — NURSING NOTE
"Chief Complaint   Patient presents with     Recheck Medication     Wellbutrin     Hypertension     recheck     Flu Shot     pt declines today       Initial BP (!) 144/96 (BP Location: Right arm, Patient Position: Sitting, Cuff Size: Adult Large)  Pulse 86  Temp 98.2  F (36.8  C) (Tympanic)  Wt (!) 305 lb (138.3 kg)  SpO2 95%  BMI 49.98 kg/m2 Estimated body mass index is 49.98 kg/(m^2) as calculated from the following:    Height as of 5/3/17: 5' 5.5\" (1.664 m).    Weight as of this encounter: 305 lb (138.3 kg).  Medication Reconciliation: complete   Suzi Jang      "

## 2017-09-27 NOTE — MR AVS SNAPSHOT
"              After Visit Summary   9/27/2017    Vannessa Molina    MRN: [id number]           Patient Information     Date Of Birth          1975        Visit Information        Provider Department      9/27/2017 2:45 PM Aaliyah Suarez MD [facility]        Today's Diagnoses     Episode of recurrent major depressive disorder, unspecified depression episode severity (H)    -  1    Benign essential hypertension        Obesity, unspecified obesity severity, unspecified obesity type          Care Instructions    Stop Wellbutrin.  Continue Lexapro.  Consider counseling.  Follow through with Dr. James for weight loss program.  Increase Losartan from 25 to 50 mg daily.  Monitor BP at home.  Call with update.    Need for annual labs in 1/2018.            Follow-ups after your visit        Who to contact     If you have questions or need follow up information about today's clinic visit or your schedule please contact [facility] directly at [phone].  Normal or non-critical lab and imaging results will be communicated to you by MyChart, letter or phone within 4 business days after the clinic has received the results. If you do not hear from us within 7 days, please contact the clinic through MyChart or phone. If you have a critical or abnormal lab result, we will notify you by phone as soon as possible.  Submit refill requests through [employer] or call your pharmacy and they will forward the refill request to us. Please allow 3 business days for your refill to be completed.          Additional Information About Your Visit        MyChart Information     [employer] lets you send messages to your doctor, view your test results, renew your prescriptions, schedule appointments and more. To sign up, go to [email or web address] . Click on \"Log in\" on the left side of the screen, which will take you to the Welcome page. Then click on \"Sign up Now\" on the right side of the page.     You will be asked " to enter the access code listed below, as well as some personal information. Please follow the directions to create your username and password.     Your access code is: [email or web address]  Expires: 2017  8:32 PM     Your access code will  in 90 days. If you need help or a new code, please call your [place] clinic or [phone].        Care EveryWhere ID     This is your Care EveryWhere ID. This could be used by other organizations to access your [place] medical records  [id number]        Your Vitals Were     Pulse Temperature Pulse Oximetry BMI (Body Mass Index)          86 98.2  F (36.8  C) (Tympanic) 95% 49.98 kg/m2         Blood Pressure from Last 3 Encounters:   17 (!) 142/94   08/10/17 128/85   17 124/86    Weight from Last 3 Encounters:   17 (!) 305 lb (138.3 kg)   08/10/17 300 lb (136.1 kg)   17 (!) 302 lb (137 kg)              Today, you had the following     No orders found for display         Today's Medication Changes          These changes are accurate as of: 17  8:05 PM.  If you have any questions, ask your nurse or doctor.               These medicines have changed or have updated prescriptions.        Dose/Directions    losartan 50 MG tablet   Commonly known as:  COZAAR   This may have changed:  See the new instructions.   Used for:  Benign essential hypertension   Changed by:  Aaliyah Suarez MD        Dose:  25 mg   Take 0.5 tablets (25 mg) by mouth daily   Quantity:  30 tablet   Refills:  1            Where to get your medicines      These medications were sent to [facility] - [address]  [address] [address]     Phone:  [phone]     losartan 50 MG tablet                Primary Care Provider Office Phone # Fax #    Aaliyah Suarez [phone]       [facility] [address] [address]        Equal Access to Services     YISEL SILVA AH: Thor Licea  waanthonyeva ghotra, qaybta kavaleria christianson, brunilda ring tawandaelia nugentaabal ah. So [facility] [phone].    ATENCIÓN: Si saima lopez, tiene a brown disposición servicios gratuitos de asistencia lingüística. Ace al [phone].    We comply with applicable federal civil rights laws and [place] laws. We do not discriminate on the basis of race, color, national origin, age, disability sex, sexual orientation or gender identity.            Thank you!     [facility] you [facility]  [facility] your [facility]. Our goal is always to provide you with excellent care. Hearing back from our patients is one way we can continue to improve our services. Please take a few minutes to complete the written survey that you may receive in the mail after your visit with us. Thank you!             Your Updated Medication List - Protect others around you: Learn how to safely use, store and throw away your medicines at www.disposemymeds.org.          This list is accurate as of: 9/27/17  8:05 PM.  Always use your most recent med list.                   Brand Name Dispense Instructions for use Diagnosis    escitalopram 20 MG tablet    LEXAPRO    30 tablet    Take 1 tablet (20 mg) by mouth daily    Episode of recurrent major depressive disorder, unspecified depression episode severity (H)       FLAX SEED OIL PO           GENTLE STOOL SOFTENER PO      Take 50 mg by mouth as needed for constipation        losartan 50 MG tablet    COZAAR    30 tablet    Take 0.5 tablets (25 mg) by mouth daily    Benign essential hypertension       MULTIVITAMIN ADULT PO           vitamin D 1000 UNITS capsule      Take 3 capsules by mouth daily

## 2017-09-28 ASSESSMENT — ANXIETY QUESTIONNAIRES: GAD7 TOTAL SCORE: 3

## 2017-09-28 NOTE — PROGRESS NOTES
SUBJECTIVE:  Vannessa is a 42 year old female who comes in today for follow up hypertension.  Was recalled follow up.  Does not monitor BP regularly, but has cuff.  Takes Losartan regularly.    Did meet with Dr. James, [facility] weight loss program.  Admits she struggles to follow through, but does have upcoming teleconference visit.    Had Wellbutrin added to Lexapro last visit.  Does not notice any improvement.  May be sweating more as a side effect.  Stressors have improved - daughter is doing better, dad is out of nursing home.  Feels she can handle things better.    Current Outpatient Prescriptions   Medication     Docusate Sodium (GENTLE STOOL SOFTENER PO)     Multiple Vitamins-Minerals (MULTIVITAMIN ADULT PO)     Flaxseed, Linseed, (FLAX SEED OIL PO)     losartan (COZAAR) 50 MG tablet     [DISCONTINUED] losartan (COZAAR) 25 MG tablet     escitalopram (LEXAPRO) 20 MG tablet     Cholecalciferol (VITAMIN D) 1000 UNITS capsule     No current facility-administered medications for this visit.         Allergies   Allergen Reactions     Iodine Other (See Comments) and Rash     (shrimp) lips swollen       Past Medical History:   Diagnosis Date     Benign essential hypertension 2015     GERD (gastroesophageal reflux diseae) 2011     Major depressive affective disorder, recurrent episode, unspecified 2011     Obesity  2011     Vitamin D deficiency 2015     Past Surgical History:   Procedure Laterality Date     bilateral tubal ligation        section  ,         oligohydramnios       Tonsillectomy       Social History     Social History     Marital status: Single     Spouse name: N/A     Number of children: N/A     Years of education: N/A     Occupational History     Not on file.     Social History Main Topics     Smoking status: Former Smoker     Types: Cigarettes     Quit date: 2004     Smokeless tobacco: Never Used      Comment: Tried to quit     Alcohol use 0.0  oz/week     0 Standard drinks or equivalent per week      Comment: 4 beers monthly     Drug use: No     Sexual activity: Yes     Partners: Male     Other Topics Concern      Service No     Blood Transfusions Yes     Permits if needed     Caffeine Concern Yes     Soda, 32 oz daily     Occupational Exposure No     Hobby Hazards No     Sleep Concern Yes     Stress Concern No     Weight Concern Yes     Special Diet No     Back Care No     Exercise Yes     3 times a week     Seat Belt Yes     Self-Exams Yes     Parent/Sibling W/ Cabg, Mi Or Angioplasty Before 65f 55m? No     Social History Narrative         ROS:  General: as above, fever, weight gain  Eyes: negative for, visual blurring, double vision  Resp: No shortness of breath, dyspnea on exertion, cough, or hemoptysis  CV: negative for, chest pain and lower extremity edema  Musculoskeletal: negative for, joint pain and joint swelling  Neurologic: negative for, headaches, local weakness, numbness or tingling of hands and numbness or tingling of feet  Psychiatric: positive for as above, excessive stress, anxiety and depression stable  Endocrine: negative for, thyroid disorder and diabetes  PHQ-9 SCORE 9/27/2017   Total Score 11     NIRAJ-7 SCORE 4/20/2017 9/20/2017 9/27/2017   Total Score 9 2 3           OBJECTIVE:  Vitals:    09/27/17 1449 09/27/17 2000   BP: (!) 144/96 (!) 142/94   BP Location: Right arm    Patient Position: Sitting    Cuff Size: Adult Large    Pulse: 86    Temp: 98.2  F (36.8  C)    TempSrc: Tympanic    SpO2: 95%    Weight: (!) 305 lb (138.3 kg)      GENERAL APPEARANCE: alert, no distress, cooperative and obese  NECK: no adenopathy, no asymmetry, masses, or scars and thyroid normal to palpation  RESP: lungs clear to auscultation - no rales, rhonchi or wheezes  CV: regular rates and rhythm, normal S1 S2, no S3 or S4 and no murmur, click or rub -  MS: extremities normal- no gross deformities noted, no evidence of inflammation in joints, FROM in  all extremities.  PSYCH: mentation appears normal. and affect normal/bright    ASSESSMENT/ORDERS:    ICD-10-CM    1. Episode of recurrent major depressive disorder, unspecified depression episode severity (H) F33.9    2. Benign essential hypertension I10 losartan (COZAAR) 50 MG tablet   3. Obesity, unspecified obesity severity, unspecified obesity type E66.9      PLAN:  Patient Instructions   Stop Wellbutrin.  Continue Lexapro.  Consider counseling.  Follow through with Dr. James for weight loss program.  Increase Losartan from 25 to 50 mg daily.  Monitor BP at home.  Call with update.    Need for annual labs in 1/2018.        Aaliyah Sethi

## 2017-09-28 NOTE — PATIENT INSTRUCTIONS
Stop Wellbutrin.  Continue Lexapro.  Consider counseling.  Follow through with Dr. James for weight loss program.  Increase Losartan from 25 to 50 mg daily.  Monitor BP at home.  Call with update.    Need for annual labs in 1/2018.

## 2017-11-07 ENCOUNTER — TELEPHONE (OUTPATIENT)
Dept: FAMILY MEDICINE | Facility: OTHER | Age: 42
End: 2017-11-07

## 2017-11-07 DIAGNOSIS — I10 BENIGN ESSENTIAL HYPERTENSION: Primary | ICD-10-CM

## 2017-11-07 RX ORDER — LOSARTAN POTASSIUM 50 MG/1
50 TABLET ORAL DAILY
Qty: 90 TABLET | Refills: 1 | Status: SHIPPED | OUTPATIENT
Start: 2017-11-07 | End: 2018-07-06

## 2017-11-07 NOTE — TELEPHONE ENCOUNTER
9/27/2017  PLAN:  Patient Instructions   Stop Wellbutrin.  Continue Lexapro.  Consider counseling.  Follow through with Dr. James for weight loss program.  Increase Losartan from 25 to 50 mg daily.  Monitor BP at home.  Call with update.    Need for annual labs in 1/2018.    Please escribe losartin 50 mg po daily to [facility] - medication pended

## 2017-11-07 NOTE — TELEPHONE ENCOUNTER
10:32 AM    Reason for Call: Phone Call    Description: Vannessa states that the pharmacy told her they need to know the dosage for her Kozar . Is  increasing ?  Please call Michael to advise    Was an appointment offered for this call?   No    Preferred method for responding to this message:   Call Michael Menodza

## 2017-11-17 ENCOUNTER — TELEPHONE (OUTPATIENT)
Dept: FAMILY MEDICINE | Facility: OTHER | Age: 42
End: 2017-11-17

## 2017-11-17 ASSESSMENT — ANXIETY QUESTIONNAIRES
3. WORRYING TOO MUCH ABOUT DIFFERENT THINGS: NOT AT ALL
GAD7 TOTAL SCORE: 2
IF YOU CHECKED OFF ANY PROBLEMS ON THIS QUESTIONNAIRE, HOW DIFFICULT HAVE THESE PROBLEMS MADE IT FOR YOU TO DO YOUR WORK, TAKE CARE OF THINGS AT HOME, OR GET ALONG WITH OTHER PEOPLE: NOT DIFFICULT AT ALL
2. NOT BEING ABLE TO STOP OR CONTROL WORRYING: NOT AT ALL
6. BECOMING EASILY ANNOYED OR IRRITABLE: SEVERAL DAYS
1. FEELING NERVOUS, ANXIOUS, OR ON EDGE: NOT AT ALL
4. TROUBLE RELAXING: SEVERAL DAYS
5. BEING SO RESTLESS THAT IT IS HARD TO SIT STILL: NOT AT ALL
7. FEELING AFRAID AS IF SOMETHING AWFUL MIGHT HAPPEN: NOT AT ALL

## 2017-11-17 ASSESSMENT — PATIENT HEALTH QUESTIONNAIRE - PHQ9: SUM OF ALL RESPONSES TO PHQ QUESTIONS 1-9: 1

## 2017-11-18 ASSESSMENT — ANXIETY QUESTIONNAIRES: GAD7 TOTAL SCORE: 2

## 2018-01-03 ENCOUNTER — HOSPITAL ENCOUNTER (EMERGENCY)
Facility: HOSPITAL | Age: 43
Discharge: HOME | End: 2018-01-03
Attending: NURSE PRACTITIONER | Admitting: NURSE PRACTITIONER
Payer: COMMERCIAL

## 2018-01-03 VITALS
DIASTOLIC BLOOD PRESSURE: 93 MMHG | TEMPERATURE: 96.7 F | RESPIRATION RATE: 16 BRPM | SYSTOLIC BLOOD PRESSURE: 142 MMHG | OXYGEN SATURATION: 98 %

## 2018-01-03 DIAGNOSIS — J00 ACUTE NASOPHARYNGITIS: ICD-10-CM

## 2018-01-03 DIAGNOSIS — H69.93 DYSFUNCTION OF BOTH EUSTACHIAN TUBES: ICD-10-CM

## 2018-01-03 PROCEDURE — G0463 HOSPITAL OUTPT CLINIC VISIT: HCPCS

## 2018-01-03 PROCEDURE — 99213 OFFICE O/P EST LOW 20 MIN: CPT | Performed by: NURSE PRACTITIONER

## 2018-01-03 ASSESSMENT — ENCOUNTER SYMPTOMS
RHINORRHEA: 0
COUGH: 1
APPETITE CHANGE: 0
WHEEZING: 1
SORE THROAT: 0
SHORTNESS OF BREATH: 1

## 2018-01-03 NOTE — ED AVS SNAPSHOT
[facility]    [address] [address]    Phone:  [phone]                                       Vannessa Molina   MRN: [id number]    Department:  [facility]   Date of Visit:  1/3/2018           After Visit Summary Signature Page     I have received my discharge instructions, and my questions have been answered. I have discussed any challenges I see with this plan with the nurse or doctor.    ..........................................................................................................................................  Patient/Patient Representative Signature      ..........................................................................................................................................  Patient Representative Print Name and Relationship to Patient    ..................................................               ................................................  Date                                            Time    ..........................................................................................................................................  Reviewed by Signature/Title    ...................................................              ..............................................  Date                                                            Time

## 2018-01-03 NOTE — ED AVS SNAPSHOT
[facility]    [address] [address]    Phone:  [phone]                                       Vannessa Molina   MRN: [id number]    Department:  [facility]   Date of Visit:  1/3/2018           Patient Information     Date Of Birth          1975        Your diagnoses for this visit were:     Dysfunction of both eustachian tubes     Acute nasopharyngitis        You were seen by Carolyne Redd NP.      Follow-up Information     Schedule an appointment as soon as possible for a visit with Aaliyah Suarez MD.    Specialty:  Family Practice    Why:  for re-evaluation in 3-5 days if not improving    Contact information:    [facility]  [address] [facility] [address]  [phone]          Follow up with [facility].    Specialty:  EMERGENCY MEDICINE    Why:  If symptoms worsen    Contact information:    [address] [address] [phone]    Additional information:    From [place]: Take [address]. Turn left at [address]. Turn left at the first stoplight on [address]. At the first stop sign, take a right onto [address]. Take a left into the parking lot and continue through until you reach the North enterance of the building.       From [place]: Take [address]. Take the [address] ramp towards [place]. Turn left onto [address]. Take a slight right onto [address] [facility]. Turn left at the first stoplight on [address]. At the first stop sign, take a right onto [address]. Take a left into the parking lot and continue through until you reach the North enterance of the building.       From [place]: Take [address]. Take a right at [address]. At the first stop sign, take a right onto [address]. Take a left into the parking lot and continue through until you reach the North enterance of the building.         Discharge  Instructions       Take Sudafed during the day or Advil Cold and Sinus  Benadryl at bed time.         Understanding the Cold Virus  Colds are the most common illness that people get. Most adults get 2 or 3 colds per year, and most children get 5 to 7 colds per year. Colds may be caused by over 200 types of viruses. The most common of these are rhinoviruses ( rhino  refers to the nose).  What causes a cold virus?  All colds start with infection by a virus. You can be infected by more than one cold virus at a time. Infection with cold viruses happens when:    You breathe in a virus from the air. This can happen when someone with a cold sneezes or coughs near you.    You touch your eyes, nose, or mouth when your hand has a cold virus on it. This can happen if you touch an object that has the cold virus on it.  What are the symptoms of a cold virus?  Almost all colds involve a stuffy nose. Other common symptoms include:    Runny nose    Sneezing    Sore throat    Headache    Cough  How is a cold treated?  Colds usually last 5 to 10 days. Treatment focuses on relieving symptoms. Treatments may include:    Decongestant medicines. Several types of decongestants are available without prescription. These may help reduce stuffy or runny nose symptoms.    Prescription or over-the-counter nasal sprays. These may help reduce nasal symptoms, including stuffiness.    Prescription or over-the-counter pain medicines. These can help with headaches and sore throat.    Self-care. This includes extra rest, using humidifiers, and drinking more fluids. These help you feel better while you are getting over a cold.  Antibiotics are not helpful for a cold. They do not make a cold shorter or relieve symptoms. Taking antibiotics when you don t need them can make them work less well when you need them for another illness.  Follow all directions for using medicines, especially when giving them to children. Contact your healthcare provider if you  have any questions about using cold medicines safely.  Can a cold be prevented?  You can help reduce the spread of cold viruses. This can help both you and others avoid getting colds. Follow these tips:    Wash your hands well anytime you may have come into contact with cold viruses. Wash your hands for at least 20 seconds. When you can t wash with soap and water, use an alcohol-based hand .    Don t touch your nose, eyes, or mouth, especially after touching something that may have a cold virus on it.    Cover your mouth and nose when you cough or sneeze. Throw away tissues after using them.    Disinfect things you touch often, such as phones and keyboards.      Stay home when you have a cold.          Review of your medicines      Our records show that you are taking the medicines listed below. If these are incorrect, please call your family doctor or clinic.        Dose / Directions Last dose taken    escitalopram 20 MG tablet   Commonly known as:  LEXAPRO   Dose:  20 mg   Quantity:  30 tablet        Take 1 tablet (20 mg) by mouth daily   Refills:  3        FLAX SEED OIL PO        Refills:  0        GENTLE STOOL SOFTENER PO   Dose:  50 mg        Take 50 mg by mouth as needed for constipation   Refills:  0        losartan 50 MG tablet   Commonly known as:  COZAAR   Dose:  50 mg   Quantity:  90 tablet        Take 1 tablet (50 mg) by mouth daily   Refills:  1        MULTIVITAMIN ADULT PO        Refills:  0        vitamin D 1000 UNITS capsule   Dose:  3 capsule        Take 3 capsules by mouth daily   Refills:  0                Orders Needing Specimen Collection     None      Pending Results     No orders found from 1/1/2018 to 1/4/2018.            Pending Culture Results     No orders found from 1/1/2018 to 1/4/2018.            Thank you for choosing [facility]       Thank you for choosing [facility] for your care. Our goal is always to provide you with excellent care. Hearing back from our patients is one way we  can continue to improve our services. Please take a few minutes to complete the written survey that you may receive in the mail after you visit with us. Thank you!        [facility] Information     [employer] gives you secure access to your electronic health record. If you see a primary care provider, you can also send messages to your care team and make appointments. If you have questions, please call your primary care clinic.  If you do not have a primary care provider, please call [phone] and they will assist you.        Care EveryWhere ID     This is your Care EveryWhere ID. This could be used by other organizations to access your [facility] medical records  [id number]        Equal Access to Services     MICHAEL[facility]red HospitalKENZIE : Thor Licea, regine ghotra, jose christianson, brunilda rehman . So [facility] [phone].    ATENCIÓN: Si habla español, tiene a brown disposición servicios gratuitos de asistencia lingüística. Llame al [phone].    We comply with applicable federal civil rights laws and [place] laws. We do not discriminate on the basis of race, color, national origin, age, disability, sex, sexual orientation, or gender identity.            After Visit Summary       This is your record. Keep this with you and show to your community pharmacist(s) and doctor(s) at your next visit.

## 2018-01-04 NOTE — DISCHARGE INSTRUCTIONS
Take Sudafed during the day or Advil Cold and Sinus  Benadryl at bed time.         Understanding the Cold Virus  Colds are the most common illness that people get. Most adults get 2 or 3 colds per year, and most children get 5 to 7 colds per year. Colds may be caused by over 200 types of viruses. The most common of these are rhinoviruses ( rhino  refers to the nose).  What causes a cold virus?  All colds start with infection by a virus. You can be infected by more than one cold virus at a time. Infection with cold viruses happens when:    You breathe in a virus from the air. This can happen when someone with a cold sneezes or coughs near you.    You touch your eyes, nose, or mouth when your hand has a cold virus on it. This can happen if you touch an object that has the cold virus on it.  What are the symptoms of a cold virus?  Almost all colds involve a stuffy nose. Other common symptoms include:    Runny nose    Sneezing    Sore throat    Headache    Cough  How is a cold treated?  Colds usually last 5 to 10 days. Treatment focuses on relieving symptoms. Treatments may include:    Decongestant medicines. Several types of decongestants are available without prescription. These may help reduce stuffy or runny nose symptoms.    Prescription or over-the-counter nasal sprays. These may help reduce nasal symptoms, including stuffiness.    Prescription or over-the-counter pain medicines. These can help with headaches and sore throat.    Self-care. This includes extra rest, using humidifiers, and drinking more fluids. These help you feel better while you are getting over a cold.  Antibiotics are not helpful for a cold. They do not make a cold shorter or relieve symptoms. Taking antibiotics when you don t need them can make them work less well when you need them for another illness.  Follow all directions for using medicines, especially when giving them to children. Contact your healthcare provider if you have any questions  about using cold medicines safely.  Can a cold be prevented?  You can help reduce the spread of cold viruses. This can help both you and others avoid getting colds. Follow these tips:    Wash your hands well anytime you may have come into contact with cold viruses. Wash your hands for at least 20 seconds. When you can t wash with soap and water, use an alcohol-based hand .    Don t touch your nose, eyes, or mouth, especially after touching something that may have a cold virus on it.    Cover your mouth and nose when you cough or sneeze. Throw away tissues after using them.    Disinfect things you touch often, such as phones and keyboards.      Stay home when you have a cold.

## 2018-01-04 NOTE — ED PROVIDER NOTES
History     Chief Complaint   Patient presents with     Cough     c/o cough and ear pain     The history is provided by the patient. No  was used.     Vannessa Molina is a 42 year old female who presents with 1 week history of ear pain, dry cough x 3 days. No belly pain, no CP. Taking dayquil for symptoms for the cough.     Problem List:    Patient Active Problem List    Diagnosis Date Noted     Benign essential hypertension 2015     Priority: Medium     Vitamin D deficiency 2015     Priority: Medium     Obesity 2011     Priority: Medium     Problem list name updated by automated process. Provider to review       Major depressive disorder, recurrent episode (H) 2011     Priority: Medium     Problem list name updated by automated process. Provider to review       GERD (gastroesophageal reflux diseae) 2011     Priority: Medium        Past Medical History:    Past Medical History:   Diagnosis Date     Benign essential hypertension 2015     GERD (gastroesophageal reflux diseae) 2011     Major depressive affective disorder, recurrent episode, unspecified 2011     Obesity  2011     Vitamin D deficiency 2015       Past Surgical History:    Past Surgical History:   Procedure Laterality Date     bilateral tubal ligation        section  ,         oligohydramnios       Tonsillectomy         Family History:    Family History   Problem Relation Age of Onset     CANCER Mother      Cervical     Depression Father      DIABETES Father      Hypertension Father      Other - See Comments Father      Cholecystectomy/Rheumatoid arthritis     Other - See Comments Sister      Endometriosis     Asthma No family hx of      Thyroid Disease No family hx of      Coronary Artery Disease No family hx of        Social History:  Marital Status:  Single [1]  Social History   Substance Use Topics     Smoking status: Former Smoker     Types: Cigarettes      Quit date: 11/30/2004     Smokeless tobacco: Never Used      Comment: Tried to quit     Alcohol use 0.0 oz/week     0 Standard drinks or equivalent per week      Comment: 4 beers monthly        Medications:      losartan (COZAAR) 50 MG tablet   Docusate Sodium (GENTLE STOOL SOFTENER PO)   Multiple Vitamins-Minerals (MULTIVITAMIN ADULT PO)   Flaxseed, Linseed, (FLAX SEED OIL PO)   escitalopram (LEXAPRO) 20 MG tablet   Cholecalciferol (VITAMIN D) 1000 UNITS capsule         Review of Systems   Constitutional: Negative for appetite change.   HENT: Positive for congestion and ear pain (Both). Negative for rhinorrhea and sore throat.    Respiratory: Positive for cough, shortness of breath (pt reports this is normal for her due to weight) and wheezing.        Physical Exam   BP: 142/93  Heart Rate: 76  Temp: 96.7  F (35.9  C)  Resp: 16  SpO2: 98 %      Physical Exam   Constitutional: She is oriented to person, place, and time. She appears well-developed and well-nourished. No distress.   HENT:   Head: Normocephalic and atraumatic.   Right Ear: Tympanic membrane and external ear normal.   Left Ear: Tympanic membrane and external ear normal.   Nose: Nose normal.   Mouth/Throat: Uvula is midline, oropharynx is clear and moist and mucous membranes are normal. No oropharyngeal exudate.   Eyes: Conjunctivae and lids are normal. Right eye exhibits no discharge. Left eye exhibits no discharge. No scleral icterus.   Neck: Normal range of motion. Neck supple.   Cardiovascular: Normal rate, regular rhythm and normal heart sounds.    Pulmonary/Chest: Effort normal and breath sounds normal. No respiratory distress.   Lymphadenopathy:     She has no cervical adenopathy.   Neurological: She is alert and oriented to person, place, and time.   Skin: Skin is warm and dry. She is not diaphoretic.   Psychiatric: She has a normal mood and affect.   Nursing note and vitals reviewed.      ED Course     ED Course     Procedures    Labs  Ordered and Resulted from Time of ED Arrival Up to the Time of Departure from the ED - No data to display    Assessments & Plan (with Medical Decision Making)     I have reviewed the nursing notes.  I have reviewed the findings, diagnosis, plan and need for follow up with the patient.    Patient verbally educated and given appropriate education sheets for each of their diagnoses and has no questions.  Take ibuprofen or Tylenol as directed on the bottle as needed.  Take OTC cold medicine as directed on bottle  Follow up with PCP for re-evaluation in 3-5 days or sooner as needed.      Final diagnoses:   Dysfunction of both eustachian tubes   Acute nasopharyngitis       1/3/2018   [facility]     Carolyne Redd NP  01/03/18 0147

## 2018-01-23 DIAGNOSIS — Z12.31 ENCOUNTER FOR SCREENING MAMMOGRAM FOR BREAST CANCER: Primary | ICD-10-CM

## 2018-02-08 ENCOUNTER — RADIANT APPOINTMENT (OUTPATIENT)
Dept: MAMMOGRAPHY | Facility: OTHER | Age: 43
End: 2018-02-08
Attending: FAMILY MEDICINE
Payer: COMMERCIAL

## 2018-02-08 DIAGNOSIS — Z12.31 ENCOUNTER FOR SCREENING MAMMOGRAM FOR BREAST CANCER: ICD-10-CM

## 2018-02-08 PROCEDURE — 77063 BREAST TOMOSYNTHESIS BI: CPT | Mod: TC

## 2018-02-08 PROCEDURE — 77067 SCR MAMMO BI INCL CAD: CPT | Mod: TC

## 2018-05-03 DIAGNOSIS — F33.9 EPISODE OF RECURRENT MAJOR DEPRESSIVE DISORDER, UNSPECIFIED DEPRESSION EPISODE SEVERITY (H): ICD-10-CM

## 2018-05-04 RX ORDER — ESCITALOPRAM OXALATE 20 MG/1
TABLET ORAL
Qty: 30 TABLET | Refills: 0 | Status: SHIPPED | OUTPATIENT
Start: 2018-05-04 | End: 2018-06-15

## 2018-05-04 NOTE — TELEPHONE ENCOUNTER
lexapro      Last Written Prescription Date:  4/20/  Last Fill Quantity: 30,   # refills: 3  Last Office Visit: 9/27/17  Future Office visit:  none

## 2018-07-06 ENCOUNTER — OFFICE VISIT (OUTPATIENT)
Dept: FAMILY MEDICINE | Facility: OTHER | Age: 43
End: 2018-07-06
Attending: FAMILY MEDICINE
Payer: COMMERCIAL

## 2018-07-06 VITALS
OXYGEN SATURATION: 98 % | TEMPERATURE: 99.1 F | HEART RATE: 77 BPM | SYSTOLIC BLOOD PRESSURE: 126 MMHG | DIASTOLIC BLOOD PRESSURE: 84 MMHG | RESPIRATION RATE: 22 BRPM

## 2018-07-06 DIAGNOSIS — I10 BENIGN ESSENTIAL HYPERTENSION: Primary | ICD-10-CM

## 2018-07-06 DIAGNOSIS — E55.9 VITAMIN D DEFICIENCY: ICD-10-CM

## 2018-07-06 DIAGNOSIS — F33.9 EPISODE OF RECURRENT MAJOR DEPRESSIVE DISORDER, UNSPECIFIED DEPRESSION EPISODE SEVERITY (H): ICD-10-CM

## 2018-07-06 LAB
ALBUMIN UR-MCNC: NEGATIVE MG/DL
ANION GAP SERPL CALCULATED.3IONS-SCNC: 8 MMOL/L (ref 3–14)
APPEARANCE UR: CLEAR
BILIRUB UR QL STRIP: NEGATIVE
BUN SERPL-MCNC: 12 MG/DL (ref 7–30)
CALCIUM SERPL-MCNC: 8.6 MG/DL (ref 8.5–10.1)
CHLORIDE SERPL-SCNC: 104 MMOL/L (ref 94–109)
CO2 SERPL-SCNC: 28 MMOL/L (ref 20–32)
COLOR UR AUTO: NORMAL
CREAT SERPL-MCNC: 0.68 MG/DL (ref 0.52–1.04)
GFR SERPL CREATININE-BSD FRML MDRD: >90 ML/MIN/1.7M2
GLUCOSE SERPL-MCNC: 99 MG/DL (ref 70–99)
GLUCOSE UR STRIP-MCNC: NEGATIVE MG/DL
HGB UR QL STRIP: NEGATIVE
KETONES UR STRIP-MCNC: NEGATIVE MG/DL
LEUKOCYTE ESTERASE UR QL STRIP: NEGATIVE
NITRATE UR QL: NEGATIVE
PH UR STRIP: 6.5 PH (ref 4.7–8)
POTASSIUM SERPL-SCNC: 3.6 MMOL/L (ref 3.4–5.3)
SODIUM SERPL-SCNC: 140 MMOL/L (ref 133–144)
SOURCE: NORMAL
SP GR UR STRIP: 1.01 (ref 1–1.03)
TSH SERPL DL<=0.005 MIU/L-ACNC: 1.34 MU/L (ref 0.4–4)
UROBILINOGEN UR STRIP-MCNC: NORMAL MG/DL (ref 0–2)

## 2018-07-06 PROCEDURE — 99213 OFFICE O/P EST LOW 20 MIN: CPT | Performed by: FAMILY MEDICINE

## 2018-07-06 PROCEDURE — 82306 VITAMIN D 25 HYDROXY: CPT | Mod: 90 | Performed by: FAMILY MEDICINE

## 2018-07-06 PROCEDURE — 99000 SPECIMEN HANDLING OFFICE-LAB: CPT | Performed by: FAMILY MEDICINE

## 2018-07-06 PROCEDURE — 80048 BASIC METABOLIC PNL TOTAL CA: CPT | Performed by: FAMILY MEDICINE

## 2018-07-06 PROCEDURE — 36415 COLL VENOUS BLD VENIPUNCTURE: CPT | Performed by: FAMILY MEDICINE

## 2018-07-06 PROCEDURE — 81003 URINALYSIS AUTO W/O SCOPE: CPT | Performed by: FAMILY MEDICINE

## 2018-07-06 PROCEDURE — 84443 ASSAY THYROID STIM HORMONE: CPT | Performed by: FAMILY MEDICINE

## 2018-07-06 RX ORDER — ESCITALOPRAM OXALATE 20 MG/1
20 TABLET ORAL DAILY
Qty: 30 TABLET | Refills: 5 | Status: SHIPPED | OUTPATIENT
Start: 2018-07-06 | End: 2019-05-17

## 2018-07-06 RX ORDER — LOSARTAN POTASSIUM 50 MG/1
50 TABLET ORAL DAILY
Qty: 30 TABLET | Refills: 5 | Status: SHIPPED | OUTPATIENT
Start: 2018-07-06 | End: 2019-06-29

## 2018-07-06 ASSESSMENT — ANXIETY QUESTIONNAIRES
1. FEELING NERVOUS, ANXIOUS, OR ON EDGE: NOT AT ALL
IF YOU CHECKED OFF ANY PROBLEMS ON THIS QUESTIONNAIRE, HOW DIFFICULT HAVE THESE PROBLEMS MADE IT FOR YOU TO DO YOUR WORK, TAKE CARE OF THINGS AT HOME, OR GET ALONG WITH OTHER PEOPLE: NOT DIFFICULT AT ALL
7. FEELING AFRAID AS IF SOMETHING AWFUL MIGHT HAPPEN: NOT AT ALL
3. WORRYING TOO MUCH ABOUT DIFFERENT THINGS: NOT AT ALL
GAD7 TOTAL SCORE: 1
2. NOT BEING ABLE TO STOP OR CONTROL WORRYING: NOT AT ALL
4. TROUBLE RELAXING: NOT AT ALL
6. BECOMING EASILY ANNOYED OR IRRITABLE: SEVERAL DAYS
5. BEING SO RESTLESS THAT IT IS HARD TO SIT STILL: NOT AT ALL

## 2018-07-06 ASSESSMENT — PAIN SCALES - GENERAL: PAINLEVEL: NO PAIN (0)

## 2018-07-06 NOTE — MR AVS SNAPSHOT
After Visit Summary   7/6/2018    Vannessa Molina    MRN: [id number]           Patient Information     Date Of Birth          1975        Visit Information        Provider Department      7/6/2018 4:00 PM Aaliyah Suarez MD [facility]        Today's Diagnoses     Benign essential hypertension    -  1    Episode of recurrent major depressive disorder, unspecified depression episode severity (H)        Vitamin D deficiency          Care Instructions    Continue current medications.  Refills done.  Will call with lab results.            Follow-ups after your visit        Who to contact     If you have questions or need follow up information about today's clinic visit or your schedule please contact [facility] directly at [phone].  Normal or non-critical lab and imaging results will be communicated to you by MyChart, letter or phone within 4 business days after the clinic has received the results. If you do not hear from us within 7 days, please contact the clinic through [employer] or phone. If you have a critical or abnormal lab result, we will notify you by phone as soon as possible.  Submit refill requests through [employer] or call your pharmacy and they will forward the refill request to us. Please allow 3 business days for your refill to be completed.          Additional Information About Your Visit        MyChart Information     [employer] gives you secure access to your electronic health record. If you see a primary care provider, you can also send messages to your care team and make appointments. If you have questions, please call your primary care clinic.  If you do not have a primary care provider, please call [phone] and they will assist you.        Care EveryWhere ID     This is your Care EveryWhere ID. This could be used by other organizations to access your [facility] medical records  [id number]        Your Vitals Were     Pulse Temperature Respirations  Pulse Oximetry          77 99.1  F (37.3  C) (Tympanic) 22 98%         Blood Pressure from Last 3 Encounters:   07/06/18 126/84   01/03/18 142/93   09/27/17 (!) 142/94    Weight from Last 3 Encounters:   09/27/17 (!) 305 lb (138.3 kg)   08/10/17 300 lb (136.1 kg)   05/03/17 (!) 302 lb (137 kg)              We Performed the Following     Basic metabolic panel     TSH with free T4 reflex     UA reflex to Microscopic and Culture     Vitamin D Deficiency          Today's Medication Changes          These changes are accurate as of 7/6/18  4:57 PM.  If you have any questions, ask your nurse or doctor.               These medicines have changed or have updated prescriptions.        Dose/Directions    escitalopram 20 MG tablet   Commonly known as:  LEXAPRO   This may have changed:  See the new instructions.   Used for:  Episode of recurrent major depressive disorder, unspecified depression episode severity (H)   Changed by:  Aaliyah Suarez MD        Dose:  20 mg   Take 1 tablet (20 mg) by mouth daily   Quantity:  30 tablet   Refills:  5            Where to get your medicines      These medications were sent to [facility] [place] - [address]  [address] [place] [address]     Phone:  [phone]     escitalopram 20 MG tablet    losartan 50 MG tablet                Primary Care Provider Office Phone # Fax #    Aaliyah Suarez [phone]       [address] [place] [address]        Equal Access to Services     [facility]: Hadii braxton ku hadasho Soomaali, waaxda luqadaha, qaybta kaalmada adeegyada, waxay safia hayyvon rehman . So [facility] [phone].    ATENCIÓN: Si habla español, tiene a brown disposición servicios gratuitos de asistencia lingüística. Llame al [phone].    We comply with applicable federal civil rights laws and [place] laws. We do not discriminate on the basis of race, color, national origin, age, disability, sex, sexual  orientation, or gender identity.            Thank you!     Thank you for choosing [facility]  for your care. Our goal is always to provide you with excellent care. Hearing back from our patients is one way we can continue to improve our services. Please take a few minutes to complete the written survey that you may receive in the mail after your visit with us. Thank you!             Your Updated Medication List - Protect others around you: Learn how to safely use, store and throw away your medicines at www.disposemymeds.org.          This list is accurate as of 7/6/18  4:57 PM.  Always use your most recent med list.                   Brand Name Dispense Instructions for use Diagnosis    escitalopram 20 MG tablet    LEXAPRO    30 tablet    Take 1 tablet (20 mg) by mouth daily    Episode of recurrent major depressive disorder, unspecified depression episode severity (H)       FLAX SEED OIL PO           GENTLE STOOL SOFTENER PO      Take 50 mg by mouth as needed for constipation        losartan 50 MG tablet    COZAAR    30 tablet    Take 1 tablet (50 mg) by mouth daily    Benign essential hypertension       MULTIVITAMIN ADULT PO           vitamin D 1000 units capsule      Take 3 capsules by mouth daily

## 2018-07-06 NOTE — NURSING NOTE
"Chief Complaint   Patient presents with     Depression     Anxiety     Hypertension       Initial /84 (BP Location: Right arm, Patient Position: Sitting, Cuff Size: Adult Large)  Pulse 77  Temp 99.1  F (37.3  C) (Tympanic)  Resp 22  SpO2 98% Estimated body mass index is 49.98 kg/(m^2) as calculated from the following:    Height as of 5/3/17: 5' 5.5\" (1.664 m).    Weight as of 9/27/17: 305 lb (138.3 kg).  Medication Reconciliation: complete    Elen Lakhani LPN    "

## 2018-07-06 NOTE — PROGRESS NOTES
SUBJECTIVE:   Vannessa Molina is a 43 year old female who presents to clinic today for the following health issues:      Hypertension Follow-up      Outpatient blood pressures are being checked at home.  Results are WNL. 130s/70s.    Low Salt Diet: not monitoring salt    Depression and Anxiety Follow-Up    Status since last visit: no change    Other associated symptoms:None    Complicating factors:     Significant life event: No     Current substance abuse: None    Would like to continue as is    Always has stressors, but denies changes    PHQ-9 2017   Total Score 11 1 10   Q9: Suicide Ideation Not at all Not at all Not at all     NIRAJ-7 SCORE 2017   Total Score 3 2 1     PHQ-9  English  PHQ-9   Any Language  NIRAJ-7  Suicide Assessment Five-step Evaluation and Treatment (SAFE-T)    Amount of exercise or physical activity: 1 day/week for an average of 30-45 minutes    Problems taking medications regularly: No    Medication side effects: none    Diet: regular (no restrictions)          Problem list and histories reviewed & adjusted, as indicated.  Additional history: as documented    Current Outpatient Prescriptions   Medication     escitalopram (LEXAPRO) 20 MG tablet     losartan (COZAAR) 50 MG tablet     Cholecalciferol (VITAMIN D) 1000 UNITS capsule     Docusate Sodium (GENTLE STOOL SOFTENER PO)     Flaxseed, Linseed, (FLAX SEED OIL PO)     Multiple Vitamins-Minerals (MULTIVITAMIN ADULT PO)     [DISCONTINUED] escitalopram (LEXAPRO) 20 MG tablet     [DISCONTINUED] losartan (COZAAR) 50 MG tablet     No current facility-administered medications for this visit.        Patient Active Problem List   Diagnosis     Obesity     Major depressive disorder, recurrent episode (H)     GERD (gastroesophageal reflux diseae)     Benign essential hypertension     Vitamin D deficiency     Past Surgical History:   Procedure Laterality Date     bilateral tubal ligation         section  2002,         oligohydramnios       Tonsillectomy  2005       Social History   Substance Use Topics     Smoking status: Former Smoker     Types: Cigarettes     Quit date: 2004     Smokeless tobacco: Never Used      Comment: Tried to quit     Alcohol use 0.0 oz/week     0 Standard drinks or equivalent per week      Comment: 4 beers monthly     Family History   Problem Relation Age of Onset     Cancer Mother      Cervical     Depression Father      Diabetes Father      Hypertension Father      Other - See Comments Father      Cholecystectomy/Rheumatoid arthritis     Other - See Comments Sister      Endometriosis     Asthma No family hx of      Thyroid Disease No family hx of      Coronary Artery Disease No family hx of            Reviewed and updated as needed this visit by clinical staff  Tobacco  Allergies  Meds  Problems       Reviewed and updated as needed this visit by Provider  Allergies  Meds  Problems         ROS:  Constitutional, HEENT, cardiovascular, pulmonary, gi and gu systems are negative, except as otherwise noted.    OBJECTIVE:     /84 (BP Location: Right arm, Patient Position: Sitting, Cuff Size: Adult Large)  Pulse 77  Temp 99.1  F (37.3  C) (Tympanic)  Resp 22  SpO2 98%  There is no height or weight on file to calculate BMI.  GENERAL: alert, no distress and obese  NECK: no adenopathy, no asymmetry, masses, or scars and thyroid normal to palpation  RESP: lungs clear to auscultation - no rales, rhonchi or wheezes  CV: regular rate and rhythm, normal S1 S2, no S3 or S4, no murmur, click or rub, no peripheral edema and peripheral pulses strong  MS: no gross musculoskeletal defects noted, no edema  SKIN: no suspicious lesions or rashes  PSYCH: mentation appears normal, affect normal/bright        ASSESSMENT/PLAN:     (I10) Benign essential hypertension  (primary encounter diagnosis)  Comment: stable  Plan: Basic metabolic panel, UA reflex to Microscopic        and  Culture, losartan (COZAAR) 50 MG tablet            (F33.9) Episode of recurrent major depressive disorder, unspecified depression episode severity (H)  Comment: stable  Plan: Vitamin D Deficiency, escitalopram (LEXAPRO) 20        MG tablet, TSH with free T4 reflex            (E55.9) Vitamin D deficiency  Plan: Vitamin D Deficiency    Was following at weight loss clinic, but did not keep follow up.  She has number to call and reschedule.    Patient Instructions   Continue current medications.  Refills done.  Will call with lab results.              Aaliyah Sethi MD  [facility]

## 2018-07-07 ASSESSMENT — PATIENT HEALTH QUESTIONNAIRE - PHQ9: SUM OF ALL RESPONSES TO PHQ QUESTIONS 1-9: 10

## 2018-07-07 ASSESSMENT — ANXIETY QUESTIONNAIRES: GAD7 TOTAL SCORE: 1

## 2018-07-10 LAB — DEPRECATED CALCIDIOL+CALCIFEROL SERPL-MC: 27 UG/L (ref 20–75)

## 2018-07-10 RX ORDER — ERGOCALCIFEROL 1.25 MG/1
50000 CAPSULE, LIQUID FILLED ORAL
Qty: 4 CAPSULE | Refills: 0 | Status: SHIPPED | OUTPATIENT
Start: 2018-07-10 | End: 2018-08-01

## 2018-12-20 ENCOUNTER — TELEPHONE (OUTPATIENT)
Dept: FAMILY MEDICINE | Facility: OTHER | Age: 43
End: 2018-12-20

## 2018-12-20 ASSESSMENT — PATIENT HEALTH QUESTIONNAIRE - PHQ9: SUM OF ALL RESPONSES TO PHQ QUESTIONS 1-9: 7

## 2018-12-20 NOTE — TELEPHONE ENCOUNTER
Called to update PHQ-9 scores over the phone with the patient. Patient had no concerns or questions at this time regarding her depression or medications. It was advised she follow up again in about a month. Patient stated understanding, no appointment scheduled at this time. Ashley A. Lechevalier, LPN on 12/20/2018 at 3:05 PM

## 2019-05-07 NOTE — PROGRESS NOTES
SUBJECTIVE:   Vannessa Molina is a 43 year old female who presents to clinic today for the following   health issues:      Mole(s)      Duration: Has had entire life.     Description (location/character/radiation): One on mid left side of back, one near left ear     Noticed both moles have darkened, however stayed the same in size     Ear mole - darkened in 2018    Mid back mole - darkened one month ago.     Intensity:  No pain with either moles     No pain or pruritis    No family history of skin cancer    History of blistering sunburns    Accompanying signs and symptoms: None    History (similar episodes/previous evaluation): Never has had moles evaluated     Precipitating or alleviating factors: None    Therapies tried and outcome: None         Additional history: as documented    Reviewed  and updated as needed this visit by clinical staff         Reviewed and updated as needed this visit by Provider         Current Outpatient Medications   Medication     Cholecalciferol (VITAMIN D) 1000 UNITS capsule     Docusate Sodium (GENTLE STOOL SOFTENER PO)     escitalopram (LEXAPRO) 20 MG tablet     Flaxseed, Linseed, (FLAX SEED OIL PO)     losartan (COZAAR) 50 MG tablet     LYSINE PO     metFORMIN (GLUCOPHAGE-XR) 500 MG 24 hr tablet     Multiple Vitamins-Minerals (MULTIVITAMIN ADULT PO)     No current facility-administered medications for this visit.        Patient Active Problem List   Diagnosis     Obesity     Major depressive disorder, recurrent episode (H)     GERD (gastroesophageal reflux diseae)     Benign essential hypertension     Vitamin D deficiency     Past Surgical History:   Procedure Laterality Date     bilateral tubal ligation        section  ,         oligohydramnios       Tonsillectomy         Social History     Tobacco Use     Smoking status: Former Smoker     Types: Cigarettes     Last attempt to quit: 2004     Years since quittin.4     Smokeless tobacco:  Never Used     Tobacco comment: Tried to quit   Substance Use Topics     Alcohol use: Yes     Alcohol/week: 0.0 oz     Comment: 4 beers monthly     Family History   Problem Relation Age of Onset     Cancer Mother         Cervical     Depression Father      Diabetes Father      Hypertension Father      Other - See Comments Father         Cholecystectomy/Rheumatoid arthritis     Other - See Comments Sister         Endometriosis     Asthma No family hx of      Thyroid Disease No family hx of      Coronary Artery Disease No family hx of            ROS:  CONSTITUTIONAL:NEGATIVE for fever, chills, change in weight  INTEGUMENTARY/SKIN: as above    OBJECTIVE:     /82 (BP Location: Left arm, Patient Position: Chair, Cuff Size: Adult Large)   Pulse 71   Temp 98.2  F (36.8  C) (Tympanic)   Wt 127.2 kg (280 lb 6.4 oz)   SpO2 99%   BMI 45.95 kg/m    Body mass index is 45.95 kg/m .  GENERAL: alert, no distress and obese  SKIN: left temple/preauricular area with round, raised, flesh colored, slightly pedunculated papules. 0.5cm; center does have some increased pigment; clear borders; back with various small moles; left mid back with oval, gray, brown, slightly raised, rough keratoses  PSYCH: mentation appears normal, affect normal/bright    Diagnostic Test Results:  none     ASSESSMENT/PLAN:         ICD-10-CM    1. Change in mole D22.9    2. Seborrheic keratoses L82.1      Discussed benign nature of SK.  Tendency to accumulate more.  If symptomatic, can treat or remove.  Left preauricular mole overall benign in appearance, long standing.  Slight increase in pigment.  Given low risk, asymptomatic, elected to monitor.  Will call if continues to change or wanting removal.    Back - SK - benign.  Treat only if symptomatic.  Can freeze with cryotherapy.  Keep an eye on left facial mole.  If further change in pigment or size, call for dermatology referral.    See patient instructions.    Aaliyah Sethi MD  [place]  [facility] [place]

## 2019-05-09 ENCOUNTER — OFFICE VISIT (OUTPATIENT)
Dept: FAMILY MEDICINE | Facility: OTHER | Age: 44
End: 2019-05-09
Attending: FAMILY MEDICINE
Payer: COMMERCIAL

## 2019-05-09 VITALS
HEART RATE: 71 BPM | TEMPERATURE: 98.2 F | DIASTOLIC BLOOD PRESSURE: 82 MMHG | SYSTOLIC BLOOD PRESSURE: 110 MMHG | OXYGEN SATURATION: 99 % | WEIGHT: 280.4 LBS | BODY MASS INDEX: 45.95 KG/M2

## 2019-05-09 DIAGNOSIS — L82.1 SEBORRHEIC KERATOSES: ICD-10-CM

## 2019-05-09 DIAGNOSIS — D22.9 CHANGE IN MOLE: Primary | ICD-10-CM

## 2019-05-09 PROCEDURE — 99213 OFFICE O/P EST LOW 20 MIN: CPT | Performed by: FAMILY MEDICINE

## 2019-05-09 RX ORDER — METFORMIN HCL 500 MG
2000 TABLET, EXTENDED RELEASE 24 HR ORAL DAILY
COMMUNITY
Start: 2019-04-30 | End: 2020-09-28

## 2019-05-09 ASSESSMENT — ANXIETY QUESTIONNAIRES
4. TROUBLE RELAXING: NOT AT ALL
2. NOT BEING ABLE TO STOP OR CONTROL WORRYING: NOT AT ALL
5. BEING SO RESTLESS THAT IT IS HARD TO SIT STILL: NOT AT ALL
7. FEELING AFRAID AS IF SOMETHING AWFUL MIGHT HAPPEN: NOT AT ALL
IF YOU CHECKED OFF ANY PROBLEMS ON THIS QUESTIONNAIRE, HOW DIFFICULT HAVE THESE PROBLEMS MADE IT FOR YOU TO DO YOUR WORK, TAKE CARE OF THINGS AT HOME, OR GET ALONG WITH OTHER PEOPLE: NOT DIFFICULT AT ALL
GAD7 TOTAL SCORE: 0
3. WORRYING TOO MUCH ABOUT DIFFERENT THINGS: NOT AT ALL
6. BECOMING EASILY ANNOYED OR IRRITABLE: NOT AT ALL
1. FEELING NERVOUS, ANXIOUS, OR ON EDGE: NOT AT ALL

## 2019-05-09 ASSESSMENT — PAIN SCALES - GENERAL: PAINLEVEL: NO PAIN (0)

## 2019-05-09 ASSESSMENT — PATIENT HEALTH QUESTIONNAIRE - PHQ9: SUM OF ALL RESPONSES TO PHQ QUESTIONS 1-9: 7

## 2019-05-09 NOTE — PATIENT INSTRUCTIONS
Back - SK - benign.  Treat only if symptomatic.  Can freeze with cryotherapy.  Keep an eye on left facial mole.  If further change in pigment or size, call for dermatology referral.  Patient Education     Understanding Seborrheic Keratosis  Seborrheic keratoses are wart-like growths on the skin. These growths are not cancer. Many people get them, especially after age 30.  How to say it  [id number]   What causes seborrheic keratoses?  Doctors do not know what causes seborrheic keratoses. They may run in families. In addition, they become more common as people get older.  What are the symptoms of seborrheic keratoses?  Here is what seborrheic keratoses often look like:    They tend to be round or oval in shape. They can be very small or about as big across as a quarter.    They can appear singly or in clusters.    They tend to be tan, brown, or black in color.    The edges may be scalloped or uneven-looking.    They can have a waxy or scaly look.    They can be a bit raised, appearing to be  stuck on  the skin.    They may become red and irritated if scratched or rubbed by clothing  Sebhorreic keratoses are not painful, but they may be itchy. They can become irritated if they are continually rubbed by skin or clothing. Seborrheic keratoses most often appear on the face, arms, chest, back, or belly.  How are seborrheic keratoses treated?  Seborrheic keratoses don t need to be treated unless they bother you. You may choose to have them removed because you find them unattractive. You may also want them removed because they get irritated and uncomfortable. A healthcare provider can remove them in an office visit. Ways that seborrheic keratoses can be removed include:    Freezing them off with liquid nitrogen    Cutting them off with a scalpel    Burning them off with electricity  What are the complications of seborrheic keratoses?  Seborrheic keratoses are not cancer, but they can look like some types of  skin cancer. So it s a good idea to ask your healthcare provider to check any new skin growths. Ask your healthcare provider about any skin problem that concerns you.  When should I call my healthcare provider?  Call your healthcare provider right away if any of these occur:    You develop a lot of seborrheic keratoses very quickly    You have a sore that does not heal within a few weeks, or heals and then comes back    You have a mole or skin growth that is changing in size, shape, or color    You have a mole or skin growth that looks different on one side from the other    You have a mole or skin growth that is not the same color throughout   Date Last Reviewed: 5/1/2016 2000-2018 [employer] [address] [place] [address], [place] [address]. All rights reserved. This information is not intended as a substitute for professional medical care. Always follow your healthcare professional's instructions.

## 2019-05-09 NOTE — NURSING NOTE
"Chief Complaint   Patient presents with     Mole       Initial /82 (BP Location: Left arm, Patient Position: Chair, Cuff Size: Adult Large)   Pulse 71   Temp 98.2  F (36.8  C) (Tympanic)   Wt 127.2 kg (280 lb 6.4 oz)   SpO2 99%   BMI 45.95 kg/m   Estimated body mass index is 45.95 kg/m  as calculated from the following:    Height as of 5/3/17: 1.664 m (5' 5.5\").    Weight as of this encounter: 127.2 kg (280 lb 6.4 oz).  Medication Reconciliation: complete    Em Veloz LPN    "

## 2019-05-10 ASSESSMENT — ANXIETY QUESTIONNAIRES: GAD7 TOTAL SCORE: 0

## 2019-05-17 DIAGNOSIS — F33.9 EPISODE OF RECURRENT MAJOR DEPRESSIVE DISORDER, UNSPECIFIED DEPRESSION EPISODE SEVERITY (H): ICD-10-CM

## 2019-05-20 RX ORDER — ESCITALOPRAM OXALATE 20 MG/1
TABLET ORAL
Qty: 30 TABLET | Refills: 1 | Status: SHIPPED | OUTPATIENT
Start: 2019-05-20 | End: 2019-08-21

## 2019-06-10 ENCOUNTER — ANCILLARY PROCEDURE (OUTPATIENT)
Dept: MAMMOGRAPHY | Facility: OTHER | Age: 44
End: 2019-06-10
Attending: FAMILY MEDICINE
Payer: COMMERCIAL

## 2019-06-10 DIAGNOSIS — Z12.39 SCREENING BREAST EXAMINATION: ICD-10-CM

## 2019-06-10 PROCEDURE — 77067 SCR MAMMO BI INCL CAD: CPT | Mod: TC

## 2019-06-10 PROCEDURE — 77063 BREAST TOMOSYNTHESIS BI: CPT | Mod: TC

## 2019-06-29 DIAGNOSIS — I10 BENIGN ESSENTIAL HYPERTENSION: ICD-10-CM

## 2019-07-01 RX ORDER — LOSARTAN POTASSIUM 50 MG/1
TABLET ORAL
Qty: 30 TABLET | Refills: 1 | Status: SHIPPED | OUTPATIENT
Start: 2019-07-01 | End: 2019-09-25

## 2019-08-21 DIAGNOSIS — F33.9 EPISODE OF RECURRENT MAJOR DEPRESSIVE DISORDER, UNSPECIFIED DEPRESSION EPISODE SEVERITY (H): ICD-10-CM

## 2019-08-23 RX ORDER — ESCITALOPRAM OXALATE 20 MG/1
TABLET ORAL
Qty: 30 TABLET | Refills: 2 | Status: SHIPPED | OUTPATIENT
Start: 2019-08-23 | End: 2019-12-24

## 2019-09-25 DIAGNOSIS — I10 BENIGN ESSENTIAL HYPERTENSION: ICD-10-CM

## 2019-09-27 RX ORDER — LOSARTAN POTASSIUM 50 MG/1
TABLET ORAL
Qty: 30 TABLET | Refills: 0 | Status: SHIPPED | OUTPATIENT
Start: 2019-09-27 | End: 2019-10-10

## 2019-10-07 NOTE — PROGRESS NOTES
SUBJECTIVE:   CC: Vannessa Molina is an 44 year old woman who presents for preventive health visit.     Healthy Habits:    Do you get at least three servings of calcium containing foods daily (dairy, green leafy vegetables, etc.)? yes    Amount of exercise or daily activities, outside of work: 1 day(s) per week    Problems taking medications regularly No    Medication side effects: No    Have you had an eye exam in the past two years? yes    Do you see a dentist twice per year? 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 Yes     Are you following a low salt diet? No    Are your blood pressures ever more than 140 on the top number (systolic) OR more   than 90 on the bottom number (diastolic), for example 140/90? No   Has been off Cozaar for 3 weeks.  Work readings - highest ; typically<120/80.    Depression Followup    How are you doing with your depression since your last visit? Improved     Are you having other symptoms that might be associated with depression? No    Have you had a significant life event?  No     Are you feeling anxious or having panic attacks?   Still having some situational anxiety    Do you have any concerns with your use of alcohol or other drugs? No    Social History     Tobacco Use     Smoking status: Former Smoker     Types: Cigarettes     Last attempt to quit: 2004     Years since quittin.8     Smokeless tobacco: Never Used     Tobacco comment: Tried to quit   Substance Use Topics      Alcohol use: Yes     Alcohol/week: 0.0 standard drinks     Comment: 4 beers monthly     Drug use: No     PHQ 2019 2019 10/10/2019   PHQ-9 Total Score 5 7 7   Q9: Thoughts of better off dead/self-harm past 2 weeks Not at all Not at all Not at all     NIRAJ-7 SCORE 2018 2019 10/10/2019   Total Score 1 0 3           Suicide Assessment Five-step Evaluation and Treatment (SAFE-T)    Today's PHQ-2 Score:   PHQ-2 (  [employer]) 2013   Q1: Little interest or pleasure in doing things 2   Q2: Feeling down, depressed or hopeless 2   PHQ-2 Score 4       Abuse: Current or Past(Physical, Sexual or Emotional)- No  Do you feel safe in your environment? Yes    Social History     Tobacco Use     Smoking status: Former Smoker     Types: Cigarettes     Last attempt to quit: 2004     Years since quittin.8     Smokeless tobacco: Never Used     Tobacco comment: Tried to quit   Substance Use Topics     Alcohol use: Yes     Alcohol/week: 0.0 standard drinks     Comment: 4 beers monthly     If you drink alcohol do you typically have >3 drinks per day or >7 drinks per week? No                     Reviewed orders with patient.  Reviewed health maintenance and updated orders accordingly - Yes  Current Outpatient Medications   Medication     cholecalciferol (VITAMIN D3) 5000 units (125 mcg) capsule     Chromium 1000 MCG TABS     Docusate Sodium (GENTLE STOOL SOFTENER PO)     escitalopram (LEXAPRO) 20 MG tablet     Flaxseed, Linseed, (FLAX SEED OIL PO)     LYSINE PO     metFORMIN (GLUCOPHAGE-XR) 500 MG 24 hr tablet     Multiple Vitamins-Minerals (MULTIVITAMIN ADULT PO)     No current facility-administered medications for this visit.        Lab work reviewed    Mammogram Screening: Patient under age 50, mutual decision reflected in health maintenance.      Pertinent mammograms are reviewed under the imaging tab.  History of abnormal Pap smear: NO - age 30-65 PAP every 5 years with negative HPV co-testing  "recommended  PAP / HPV Latest Ref Rng & Units 2017   PAP - NIL NIL   HPV 16 DNA NEG Negative -   HPV 18 DNA NEG Negative -   OTHER HR HPV NEG Negative -     Reviewed and updated as needed this visit by clinical staff  Tobacco  Allergies  Meds  Problems  Med Hx  Surg Hx  Fam Hx         Reviewed and updated as needed this visit by Provider  Allergies        Past Medical History:   Diagnosis Date     Benign essential hypertension 2015     GERD (gastroesophageal reflux diseae) 2011     Major depressive affective disorder, recurrent episode, unspecified 2011     Obesity  2011     Vitamin D deficiency 2015      Past Surgical History:   Procedure Laterality Date     bilateral tubal ligation        section  ,         oligohydramnios       Tonsillectomy         ROS:  CONSTITUTIONAL:POSITIVE  for weight loss  INTEGUMENTARU/SKIN: NEGATIVE for worrisome rashes, moles or lesions  EYES: NEGATIVE for vision changes or irritation  ENT: NEGATIVE for ear, mouth and throat problems  RESP: NEGATIVE for significant cough or SOB  BREAST: NEGATIVE for masses, tenderness or discharge  CV: NEGATIVE for chest pain, palpitations or peripheral edema  GI: NEGATIVE for nausea, abdominal pain, heartburn, or change in bowel habits  : NEGATIVE for unusual urinary or vaginal symptoms. Periods are regular.  MUSCULOSKELETAL: NEGATIVE for significant arthralgias or myalgia  NEURO: NEGATIVE for weakness, dizziness or paresthesias  PSYCHIATRIC: POSITIVE foranxiety    OBJECTIVE:   /80 (BP Location: Right arm, Patient Position: Sitting, Cuff Size: Adult Large)   Pulse 64   Temp 97.5  F (36.4  C) (Tympanic)   Resp 20   Ht 1.664 m (5' 5.5\")   Wt 125.6 kg (277 lb)   SpO2 98%   BMI 45.39 kg/m    EXAM:  GENERAL: alert, no distress and obese  EYES: Eyes grossly normal to inspection, PERRL and conjunctivae and sclerae normal  HENT: ear canals and TM's normal, nose and mouth " without ulcers or lesions  NECK: no adenopathy, no asymmetry, masses, or scars and thyroid normal to palpation  RESP: lungs clear to auscultation - no rales, rhonchi or wheezes  BREAST: normal without masses, tenderness or nipple discharge and no palpable axillary masses or adenopathy  CV: regular rate and rhythm, normal S1 S2, no S3 or S4, no murmur, click or rub, no peripheral edema and peripheral pulses strong  ABDOMEN: soft, nontender, no hepatosplenomegaly, no masses and bowel sounds normal   (female): normal female external genitalia, normal urethral meatus , vaginal mucosa pink, moist, well rugated and bimanual exam without masses or tenderness  MS: no gross musculoskeletal defects noted, no edema  SKIN: no suspicious lesions or rashes  NEURO: Normal strength and tone, mentation intact and speech normal  PSYCH: mentation appears normal, affect normal/bright    Diagnostic Test Results:  Labs reviewed in Epic  Additional labs pending.    ASSESSMENT/PLAN:       ICD-10-CM    1. Routine general medical examination at a health care facility Z00.00    2. Vitamin D deficiency E55.9    3. Benign essential hypertension I10    4. Episode of recurrent major depressive disorder, unspecified depression episode severity (H) F33.9    5. Gastroesophageal reflux disease, esophagitis presence not specified K21.9    6. Menorrhagia with regular cycle N92.0 CBC with platelets and differential     Iron and iron binding capacity     Ferritin     US Pelvic Complete with Transvaginal   7. Morbid obesity (H) E66.01        COUNSELING:   Reviewed preventive health counseling, as reflected in patient instructions       Regular exercise       Healthy diet/nutrition       Vision screening       Hearing screening       Immunizations    Flu shot advised - gets through work             Alcohol Use       Contraception       Safe sex practices/STD prevention    Estimated body mass index is 45.39 kg/m  as calculated from the following:     "Height as of this encounter: 1.664 m (5' 5.5\").    Weight as of this encounter: 125.6 kg (277 lb).    Weight management plan: Discussed healthy diet and exercise guidelines follows with [facility]     reports that she quit smoking about 14 years ago. Her smoking use included cigarettes. She has never used smokeless tobacco.      Counseling Resources:  ATP IV Guidelines  Pooled Cohorts Equation Calculator  Breast Cancer Risk Calculator  FRAX Risk Assessment  ICSI Preventive Guidelines  Dietary Guidelines for Americans, 2010  USDA's MyPlate  ASA Prophylaxis  Lung CA Screening    Aaliyah Sethi MD  [facility] [place]  "

## 2019-10-07 NOTE — PATIENT INSTRUCTIONS
Will call with labs.  Pelvic ultrasound ordered.  Flu shot advised.        Preventive Health Recommendations  Female Ages 40 to 49    Yearly exam:     See your health care provider every year in order to  1. Review health changes.   2. Discuss preventive care.    3. Review your medicines if your doctor prescribed any.      Get a Pap test every three years (unless you have an abnormal result and your provider advises testing more often).      If you get Pap tests with HPV test, you only need to test every 5 years, unless you have an abnormal result. You do not need a Pap test if your uterus was removed (hysterectomy) and you have not had cancer.      You should be tested each year for STDs (sexually transmitted diseases), if you're at risk.     Ask your doctor if you should have a mammogram.      Have a colonoscopy (test for colon cancer) if someone in your family has had colon cancer or polyps before age 50.       Have a cholesterol test every 5 years.       Have a diabetes test (fasting glucose) after age 45. If you are at risk for diabetes, you should have this test every 3 years.    Shots: Get a flu shot each year. Get a tetanus shot every 10 years.     Nutrition:     Eat at least 5 servings of fruits and vegetables each day.    Eat whole-grain bread, whole-wheat pasta and brown rice instead of white grains and rice.    Get adequate Calcium and Vitamin D.      Lifestyle    Exercise at least 150 minutes a week (an average of 30 minutes a day, 5 days a week). This will help you control your weight and prevent disease.    Limit alcohol to one drink per day.    No smoking.     Wear sunscreen to prevent skin cancer.    See your dentist every six months for an exam and cleaning.

## 2019-10-10 ENCOUNTER — OFFICE VISIT (OUTPATIENT)
Dept: FAMILY MEDICINE | Facility: OTHER | Age: 44
End: 2019-10-10
Attending: FAMILY MEDICINE
Payer: COMMERCIAL

## 2019-10-10 VITALS
DIASTOLIC BLOOD PRESSURE: 80 MMHG | SYSTOLIC BLOOD PRESSURE: 118 MMHG | HEIGHT: 66 IN | HEART RATE: 64 BPM | RESPIRATION RATE: 20 BRPM | BODY MASS INDEX: 44.52 KG/M2 | TEMPERATURE: 97.5 F | WEIGHT: 277 LBS | OXYGEN SATURATION: 98 %

## 2019-10-10 DIAGNOSIS — I10 BENIGN ESSENTIAL HYPERTENSION: ICD-10-CM

## 2019-10-10 DIAGNOSIS — N92.0 MENORRHAGIA WITH REGULAR CYCLE: ICD-10-CM

## 2019-10-10 DIAGNOSIS — Z00.00 ROUTINE GENERAL MEDICAL EXAMINATION AT A HEALTH CARE FACILITY: Primary | ICD-10-CM

## 2019-10-10 DIAGNOSIS — E55.9 VITAMIN D DEFICIENCY: ICD-10-CM

## 2019-10-10 DIAGNOSIS — F33.9 EPISODE OF RECURRENT MAJOR DEPRESSIVE DISORDER, UNSPECIFIED DEPRESSION EPISODE SEVERITY (H): ICD-10-CM

## 2019-10-10 DIAGNOSIS — K21.9 GASTROESOPHAGEAL REFLUX DISEASE, ESOPHAGITIS PRESENCE NOT SPECIFIED: ICD-10-CM

## 2019-10-10 DIAGNOSIS — E66.01 MORBID OBESITY (H): ICD-10-CM

## 2019-10-10 LAB
BASOPHILS # BLD AUTO: 0.1 10E9/L (ref 0–0.2)
BASOPHILS NFR BLD AUTO: 1.5 %
DIFFERENTIAL METHOD BLD: NORMAL
EOSINOPHIL # BLD AUTO: 0.4 10E9/L (ref 0–0.7)
EOSINOPHIL NFR BLD AUTO: 5.2 %
ERYTHROCYTE [DISTWIDTH] IN BLOOD BY AUTOMATED COUNT: 13.7 % (ref 10–15)
FERRITIN SERPL-MCNC: 8 NG/ML (ref 12–150)
HCT VFR BLD AUTO: 39.2 % (ref 35–47)
HGB BLD-MCNC: 12.5 G/DL (ref 11.7–15.7)
IMM GRANULOCYTES # BLD: 0 10E9/L (ref 0–0.4)
IMM GRANULOCYTES NFR BLD: 0.3 %
IRON SATN MFR SERPL: 9 % (ref 15–46)
IRON SERPL-MCNC: 31 UG/DL (ref 35–180)
LYMPHOCYTES # BLD AUTO: 2.9 10E9/L (ref 0.8–5.3)
LYMPHOCYTES NFR BLD AUTO: 42.9 %
MCH RBC QN AUTO: 27 PG (ref 26.5–33)
MCHC RBC AUTO-ENTMCNC: 31.9 G/DL (ref 31.5–36.5)
MCV RBC AUTO: 85 FL (ref 78–100)
MONOCYTES # BLD AUTO: 0.5 10E9/L (ref 0–1.3)
MONOCYTES NFR BLD AUTO: 7 %
NEUTROPHILS # BLD AUTO: 2.9 10E9/L (ref 1.6–8.3)
NEUTROPHILS NFR BLD AUTO: 43.1 %
NRBC # BLD AUTO: 0 10*3/UL
NRBC BLD AUTO-RTO: 0 /100
PLATELET # BLD AUTO: 418 10E9/L (ref 150–450)
RBC # BLD AUTO: 4.63 10E12/L (ref 3.8–5.2)
TIBC SERPL-MCNC: 362 UG/DL (ref 240–430)
WBC # BLD AUTO: 6.7 10E9/L (ref 4–11)

## 2019-10-10 PROCEDURE — 99396 PREV VISIT EST AGE 40-64: CPT | Performed by: FAMILY MEDICINE

## 2019-10-10 PROCEDURE — 83550 IRON BINDING TEST: CPT | Performed by: FAMILY MEDICINE

## 2019-10-10 PROCEDURE — 82728 ASSAY OF FERRITIN: CPT | Performed by: FAMILY MEDICINE

## 2019-10-10 PROCEDURE — 85025 COMPLETE CBC W/AUTO DIFF WBC: CPT | Performed by: FAMILY MEDICINE

## 2019-10-10 PROCEDURE — 36415 COLL VENOUS BLD VENIPUNCTURE: CPT | Performed by: FAMILY MEDICINE

## 2019-10-10 PROCEDURE — 83540 ASSAY OF IRON: CPT | Performed by: FAMILY MEDICINE

## 2019-10-10 ASSESSMENT — PATIENT HEALTH QUESTIONNAIRE - PHQ9: SUM OF ALL RESPONSES TO PHQ QUESTIONS 1-9: 7

## 2019-10-10 ASSESSMENT — MIFFLIN-ST. JEOR: SCORE: 1920.27

## 2019-10-10 ASSESSMENT — ANXIETY QUESTIONNAIRES
GAD7 TOTAL SCORE: 3
3. WORRYING TOO MUCH ABOUT DIFFERENT THINGS: SEVERAL DAYS
6. BECOMING EASILY ANNOYED OR IRRITABLE: SEVERAL DAYS
4. TROUBLE RELAXING: SEVERAL DAYS
IF YOU CHECKED OFF ANY PROBLEMS ON THIS QUESTIONNAIRE, HOW DIFFICULT HAVE THESE PROBLEMS MADE IT FOR YOU TO DO YOUR WORK, TAKE CARE OF THINGS AT HOME, OR GET ALONG WITH OTHER PEOPLE: SOMEWHAT DIFFICULT
2. NOT BEING ABLE TO STOP OR CONTROL WORRYING: NOT AT ALL
1. FEELING NERVOUS, ANXIOUS, OR ON EDGE: NOT AT ALL
5. BEING SO RESTLESS THAT IT IS HARD TO SIT STILL: NOT AT ALL
7. FEELING AFRAID AS IF SOMETHING AWFUL MIGHT HAPPEN: NOT AT ALL

## 2019-10-10 ASSESSMENT — PAIN SCALES - GENERAL: PAINLEVEL: NO PAIN (0)

## 2019-10-10 NOTE — NURSING NOTE
"Chief Complaint   Patient presents with     Physical       Initial /80 (BP Location: Right arm, Patient Position: Sitting, Cuff Size: Adult Large)   Pulse 64   Temp 97.5  F (36.4  C) (Tympanic)   Resp 20   Ht 1.664 m (5' 5.5\")   Wt 125.6 kg (277 lb)   SpO2 98%   BMI 45.39 kg/m   Estimated body mass index is 45.39 kg/m  as calculated from the following:    Height as of this encounter: 1.664 m (5' 5.5\").    Weight as of this encounter: 125.6 kg (277 lb).  Medication Reconciliation: complete  Elen Lakhani LPN  "

## 2019-10-11 ENCOUNTER — HOSPITAL ENCOUNTER (OUTPATIENT)
Dept: ULTRASOUND IMAGING | Facility: HOSPITAL | Age: 44
Discharge: HOME | End: 2019-10-11
Attending: FAMILY MEDICINE | Admitting: FAMILY MEDICINE
Payer: COMMERCIAL

## 2019-10-11 DIAGNOSIS — N92.0 MENORRHAGIA WITH REGULAR CYCLE: ICD-10-CM

## 2019-10-11 PROCEDURE — 76830 TRANSVAGINAL US NON-OB: CPT | Mod: TC

## 2019-10-11 ASSESSMENT — ANXIETY QUESTIONNAIRES: GAD7 TOTAL SCORE: 3

## 2019-10-12 ENCOUNTER — TELEPHONE (OUTPATIENT)
Dept: FAMILY MEDICINE | Facility: OTHER | Age: 44
End: 2019-10-12

## 2019-10-12 DIAGNOSIS — N92.1 MENORRHAGIA WITH IRREGULAR CYCLE: Primary | ICD-10-CM

## 2019-10-14 ENCOUNTER — MYC MEDICAL ADVICE (OUTPATIENT)
Dept: FAMILY MEDICINE | Facility: OTHER | Age: 44
End: 2019-10-14

## 2019-10-23 ENCOUNTER — OFFICE VISIT (OUTPATIENT)
Dept: OBGYN | Facility: OTHER | Age: 44
End: 2019-10-23
Attending: OBSTETRICS & GYNECOLOGY
Payer: COMMERCIAL

## 2019-10-23 VITALS
BODY MASS INDEX: 44.52 KG/M2 | WEIGHT: 277 LBS | DIASTOLIC BLOOD PRESSURE: 82 MMHG | SYSTOLIC BLOOD PRESSURE: 124 MMHG | HEART RATE: 65 BPM | HEIGHT: 66 IN

## 2019-10-23 DIAGNOSIS — N92.0 MENORRHAGIA WITH REGULAR CYCLE: Primary | ICD-10-CM

## 2019-10-23 PROCEDURE — 99202 OFFICE O/P NEW SF 15 MIN: CPT | Performed by: OBSTETRICS & GYNECOLOGY

## 2019-10-23 RX ORDER — MEDROXYPROGESTERONE ACETATE 10 MG
10 TABLET ORAL DAILY
Qty: 30 TABLET | Refills: 11 | Status: SHIPPED | OUTPATIENT
Start: 2019-10-23 | End: 2020-11-11

## 2019-10-23 ASSESSMENT — MIFFLIN-ST. JEOR: SCORE: 1920.27

## 2019-10-23 ASSESSMENT — PAIN SCALES - GENERAL: PAINLEVEL: NO PAIN (0)

## 2019-10-23 NOTE — NURSING NOTE
"Chief Complaint   Patient presents with     Consult     Jossie-menorrhagia       Initial /82   Pulse 65   Ht 1.664 m (5' 5.5\")   Wt 125.6 kg (277 lb)   BMI 45.39 kg/m   Estimated body mass index is 45.39 kg/m  as calculated from the following:    Height as of this encounter: 1.664 m (5' 5.5\").    Weight as of this encounter: 125.6 kg (277 lb).  Medication Reconciliation: complete  Elsa Lane LPN    "

## 2019-10-23 NOTE — PROGRESS NOTES
S:   Consult from Aaliyah Suarez for menorrhagia with regular cycles.  Had US.    Menses have been getting heavier.  Cycles last what is typical for her.  No irregularity.  Denies IMB.  Denies PCB    O:   US is normal with EC 11mm.         CBC is normal, but iron studies are low    A:   Menorrhagia with regular cycles.        Normal CBC and US        Low iron studies.    P:   Discussed progestin therapy trial for 3         Months.         Provera 10mg daily          Follow-up 3 months.

## 2020-01-09 DIAGNOSIS — E61.1 IRON DEFICIENCY: ICD-10-CM

## 2020-01-09 LAB
FERRITIN SERPL-MCNC: 38 NG/ML (ref 12–150)
IRON SATN MFR SERPL: 13 % (ref 15–46)
IRON SERPL-MCNC: 52 UG/DL (ref 35–180)
TIBC SERPL-MCNC: 410 UG/DL (ref 240–430)

## 2020-01-09 PROCEDURE — 83550 IRON BINDING TEST: CPT | Performed by: FAMILY MEDICINE

## 2020-01-09 PROCEDURE — 83540 ASSAY OF IRON: CPT | Performed by: FAMILY MEDICINE

## 2020-01-09 PROCEDURE — 82728 ASSAY OF FERRITIN: CPT | Performed by: FAMILY MEDICINE

## 2020-01-23 PROBLEM — N92.0 MENORRHAGIA WITH REGULAR CYCLE: Status: ACTIVE | Noted: 2019-10-23

## 2020-02-14 ENCOUNTER — OFFICE VISIT (OUTPATIENT)
Dept: OBGYN | Facility: OTHER | Age: 45
End: 2020-02-14
Attending: OBSTETRICS & GYNECOLOGY
Payer: COMMERCIAL

## 2020-02-14 VITALS
WEIGHT: 279 LBS | HEIGHT: 66 IN | SYSTOLIC BLOOD PRESSURE: 110 MMHG | HEART RATE: 60 BPM | BODY MASS INDEX: 44.84 KG/M2 | DIASTOLIC BLOOD PRESSURE: 84 MMHG

## 2020-02-14 DIAGNOSIS — N92.0 MENORRHAGIA WITH REGULAR CYCLE: Primary | ICD-10-CM

## 2020-02-14 PROCEDURE — 99212 OFFICE O/P EST SF 10 MIN: CPT | Performed by: OBSTETRICS & GYNECOLOGY

## 2020-02-14 ASSESSMENT — PAIN SCALES - GENERAL: PAINLEVEL: NO PAIN (0)

## 2020-02-14 ASSESSMENT — MIFFLIN-ST. JEOR: SCORE: 1929.35

## 2020-02-14 NOTE — PROGRESS NOTES
S:   Follow-up of Menorrhagia on Provera therapy.    Patient for follow-up of her menorrhagia. Menses are very light and regular.  She is pleased.  Medication is working very well.      O:   US done 3 months ago reviewed and NL      A:   Menorrhagia in regular cycles controlled with daily Provera therapy.  Normal US.      P:   Maintain Provera 10mg daily.         See prn

## 2020-02-14 NOTE — NURSING NOTE
"Chief Complaint   Patient presents with     Follow Up       Initial /84   Pulse 60   Ht 1.664 m (5' 5.5\")   Wt 126.6 kg (279 lb)   BMI 45.72 kg/m   Estimated body mass index is 45.72 kg/m  as calculated from the following:    Height as of this encounter: 1.664 m (5' 5.5\").    Weight as of this encounter: 126.6 kg (279 lb).  Medication Reconciliation: complete  Elsa Lane LPN    "

## 2020-03-02 ENCOUNTER — HEALTH MAINTENANCE LETTER (OUTPATIENT)
Age: 45
End: 2020-03-02

## 2020-04-22 PROBLEM — E88.810 INSULIN RESISTANCE SYNDROME: Status: ACTIVE | Noted: 2018-09-20

## 2020-04-22 NOTE — PROGRESS NOTES
"Vannessa Molina is a 44 year old female who is being evaluated via a billable telephone visit.      The patient has been notified of following:     \"This telephone visit will be conducted via a call between you and your physician/provider. We have found that certain health care needs can be provided without the need for a physical exam.  This service lets us provide the care you need with a short phone conversation.  If a prescription is necessary we can send it directly to your pharmacy.  If lab work is needed we can place an order for that and you can then stop by our lab to have the test done at a later time.    Telephone visits are billed at different rates depending on your insurance coverage. During this emergency period, for some insurers they may be billed the same as an in-person visit.  Please reach out to your insurance provider with any questions.    If during the course of the call the physician/provider feels a telephone visit is not appropriate, you will not be charged for this service.\"    Patient has given verbal consent for Telephone visit?  Yes    How would you like to obtain your AVS? Pagehart    Subjective     Vannessa Molina is a 44 year old female who presents to clinic today for the following health issues:    HPI  Hypertension Follow-up      Do you check your blood pressure regularly outside of the clinic? Yes     Are you following a low salt diet? No    Are your blood pressures ever more than 140 on the top number (systolic) OR more   than 90 on the bottom number (diastolic), for example 140/90? Yes once or 2 a week systolic  Home readings back up with stressors.  146/96 highest.  144/98.    Prior Losartan 25 mg.  Off since 9/2019.    Prior Lisinopril 10 mg.  Uses CPAP for TROY.  Obesity.    Depression and Anxiety Follow-Up    How are you doing with your depression since your last visit? Worsened     How are you doing with your anxiety since your last visit?  Worsened     Are you having other " symptoms that might be associated with depression or anxiety? No    Have you had a significant life event? OTHER: pt didnt disclose but stated alot     Do you have any concerns with your use of alcohol or other drugs? No     On lexapro 20 mg daily for years    Prior wellbutrin for short term    Prior vistaril - made patient kelly    Last visit 10/2019 for annual physical    Working from home    [employer] laid off    Irritable    Benadryl and melatonin to sleep; trouble falling asleep    Prior Zoloft - around both pregnancies - would like to try this again; daughter on it too    Social History     Tobacco Use     Smoking status: Former Smoker     Types: Cigarettes     Last attempt to quit: 11/30/2004     Years since quitting: 15.4     Smokeless tobacco: Never Used     Tobacco comment: Tried to quit   Substance Use Topics     Alcohol use: Yes     Alcohol/week: 0.0 standard drinks     Comment: 4 beers monthly     Drug use: No     PHQ 5/9/2019 10/10/2019 4/23/2020   PHQ-9 Total Score 7 7 10   Q9: Thoughts of better off dead/self-harm past 2 weeks Not at all Not at all Not at all     NIRAJ-7 SCORE 5/9/2019 10/10/2019 4/23/2020   Total Score 0 3 5     Last PHQ-9 4/23/2020   1.  Little interest or pleasure in doing things 1   2.  Feeling down, depressed, or hopeless 2   3.  Trouble falling or staying asleep, or sleeping too much 3   4.  Feeling tired or having little energy 1   5.  Poor appetite or overeating 3   6.  Feeling bad about yourself 0   7.  Trouble concentrating 0   8.  Moving slowly or restless 0   Q9: Thoughts of better off dead/self-harm past 2 weeks 0   PHQ-9 Total Score 10   Difficulty at work, home, or with people Somewhat difficult     NIRAJ-7  4/23/2020   1. Feeling nervous, anxious, or on edge 0   2. Not being able to stop or control worrying 1   3. Worrying too much about different things 1   4. Trouble relaxing 1   5. Being so restless that it is hard to sit still 0   6. Becoming easily annoyed or  irritable 1   7. Feeling afraid, as if something awful might happen 1   NIRAJ-7 Total Score 5   If you checked any problems, how difficult have they made it for you to do your work, take care of things at home, or get along with other people? Somewhat difficult         Suicide Assessment Five-step Evaluation and Treatment (SAFE-T)      How many servings of fruits and vegetables do you eat daily?  2-3    On average, how many sweetened beverages do you drink each day (Examples: soda, juice, sweet tea, etc.  Do NOT count diet or artificially sweetened beverages)?   0 twice a week maybe    How many days per week do you exercise enough to make your heart beat faster? 7    How many minutes a day do you exercise enough to make your heart beat faster? 10 - 19    How many days per week do you miss taking your medication? 0           Current Outpatient Medications   Medication     cholecalciferol (VITAMIN D3) 5000 units (125 mcg) capsule     Docusate Sodium (GENTLE STOOL SOFTENER PO)     Flaxseed, Linseed, (FLAX SEED OIL PO)     losartan (COZAAR) 25 MG tablet     LYSINE PO     medroxyPROGESTERone (PROVERA) 10 MG tablet     metFORMIN (GLUCOPHAGE-XR) 500 MG 24 hr tablet     sertraline (ZOLOFT) 50 MG tablet     traZODone (DESYREL) 50 MG tablet     No current facility-administered medications for this visit.        Patient Active Problem List   Diagnosis     Obesity     Major depressive disorder, recurrent episode (H)     GERD (gastroesophageal reflux diseae)     Benign essential hypertension     Vitamin D deficiency     Morbid obesity (H)     Menorrhagia with regular cycle--US normal--PROVERA daily---10/2019     Insulin resistance syndrome     Past Surgical History:   Procedure Laterality Date     bilateral tubal ligation        section  ,         oligohydramnios       Tonsillectomy         Social History     Tobacco Use     Smoking status: Former Smoker     Types: Cigarettes     Last attempt to quit:  "11/30/2004     Years since quitting: 15.4     Smokeless tobacco: Never Used     Tobacco comment: Tried to quit   Substance Use Topics     Alcohol use: Yes     Alcohol/week: 0.0 standard drinks     Comment: 4 beers monthly     Family History   Problem Relation Age of Onset     Cancer Mother         Cervical     Depression Father      Diabetes Father      Hypertension Father      Other - See Comments Father         Cholecystectomy/Rheumatoid arthritis     Other - See Comments Sister         Endometriosis     Asthma No family hx of      Thyroid Disease No family hx of      Coronary Artery Disease No family hx of            Reviewed and updated as needed this visit by Provider  Tobacco  Allergies  Meds  Med Hx  Surg Hx  Fam Hx  Soc Hx        Review of Systems   ROS COMP: Constitutional, HEENT, cardiovascular, pulmonary, gi and gu systems are negative, except as otherwise noted.       Objective   Reported vitals:  BP (!) 133/97 (BP Location: Right arm, Patient Position: Sitting, Cuff Size: Adult Regular)   Pulse 72   Temp 98.6  F (37  C) (Tympanic)   Ht 1.727 m (5' 8\")   Wt 130.6 kg (288 lb)   SpO2 98%   BMI 43.79 kg/m       Diagnostic Test Results:  Labs reviewed in Epic        Assessment/Plan:  1. Benign essential hypertension  Recurred.  Did well prior with Losartan.  Will restart.  Able to monitor at home/work and update with readings.  CMP within next month.  - losartan (COZAAR) 25 MG tablet; Take 1 tablet (25 mg) by mouth daily  Dispense: 90 tablet; Refill: 1  - Comprehensive metabolic panel (BMP + Alb, Alk Phos, ALT, AST, Total. Bili, TP); Future    2. Episode of recurrent major depressive disorder, unspecified depression episode severity (H)  Progressive.  Situational.  maxed out on lexapro fr quite sometime.  Discussed augmenting with abilify or wellbutrin vs changing.  Prefers to change.  Trial of zoloft.    - sertraline (ZOLOFT) 50 MG tablet; Take 1 tablet (50 mg) by mouth daily  Dispense: 90 " tablet; Refill: 0    3. Insomnia, unspecified type  Prior zombie effect with vistaril.  Will try trazodone.  - traZODone (DESYREL) 50 MG tablet; Take 1-2 tablets ( mg) by mouth At Bedtime  Dispense: 60 tablet; Refill: 1    Return in about 1 month (around 5/23/2020) for hypertension, depression/anxiety/insomnia.      Phone call duration:  5     Patient Instructions   Stop Lexapro.  Start Zoloft 50 mg daily.  May need to increase dose.   Trial of Trazodone  mg as needed.  Start 1 new medication at a time so you know how you react, side effects, etc.  Stress reduction as able - meditation, yoga/exercise, counseling/therapy.    Restart Losartan 25 mg daily for BP.  Recheck BP and labs within 1 month, sooner if concerns.  Please my chart message an update with readings in 2 weeks.    Follow up 1 month - sooner if concerns.          Aaliyah Sethi MD

## 2020-04-23 ENCOUNTER — VIRTUAL VISIT (OUTPATIENT)
Dept: FAMILY MEDICINE | Facility: OTHER | Age: 45
End: 2020-04-23
Attending: FAMILY MEDICINE
Payer: COMMERCIAL

## 2020-04-23 VITALS
HEIGHT: 68 IN | OXYGEN SATURATION: 98 % | SYSTOLIC BLOOD PRESSURE: 133 MMHG | BODY MASS INDEX: 43.65 KG/M2 | DIASTOLIC BLOOD PRESSURE: 97 MMHG | WEIGHT: 288 LBS | HEART RATE: 72 BPM | TEMPERATURE: 98.6 F

## 2020-04-23 DIAGNOSIS — I10 BENIGN ESSENTIAL HYPERTENSION: Primary | ICD-10-CM

## 2020-04-23 DIAGNOSIS — F33.9 EPISODE OF RECURRENT MAJOR DEPRESSIVE DISORDER, UNSPECIFIED DEPRESSION EPISODE SEVERITY (H): ICD-10-CM

## 2020-04-23 DIAGNOSIS — G47.00 INSOMNIA, UNSPECIFIED TYPE: ICD-10-CM

## 2020-04-23 PROCEDURE — 99213 OFFICE O/P EST LOW 20 MIN: CPT | Mod: 95 | Performed by: FAMILY MEDICINE

## 2020-04-23 RX ORDER — LOSARTAN POTASSIUM 25 MG/1
25 TABLET ORAL DAILY
Qty: 90 TABLET | Refills: 1 | Status: SHIPPED | OUTPATIENT
Start: 2020-04-23 | End: 2020-11-11

## 2020-04-23 RX ORDER — TRAZODONE HYDROCHLORIDE 50 MG/1
50-100 TABLET, FILM COATED ORAL AT BEDTIME
Qty: 60 TABLET | Refills: 1 | Status: SHIPPED | OUTPATIENT
Start: 2020-04-23 | End: 2020-05-21 | Stop reason: SINTOL

## 2020-04-23 ASSESSMENT — PAIN SCALES - GENERAL: PAINLEVEL: NO PAIN (0)

## 2020-04-23 ASSESSMENT — ANXIETY QUESTIONNAIRES
3. WORRYING TOO MUCH ABOUT DIFFERENT THINGS: SEVERAL DAYS
GAD7 TOTAL SCORE: 5
5. BEING SO RESTLESS THAT IT IS HARD TO SIT STILL: NOT AT ALL
IF YOU CHECKED OFF ANY PROBLEMS ON THIS QUESTIONNAIRE, HOW DIFFICULT HAVE THESE PROBLEMS MADE IT FOR YOU TO DO YOUR WORK, TAKE CARE OF THINGS AT HOME, OR GET ALONG WITH OTHER PEOPLE: SOMEWHAT DIFFICULT
6. BECOMING EASILY ANNOYED OR IRRITABLE: SEVERAL DAYS
1. FEELING NERVOUS, ANXIOUS, OR ON EDGE: NOT AT ALL
7. FEELING AFRAID AS IF SOMETHING AWFUL MIGHT HAPPEN: SEVERAL DAYS
2. NOT BEING ABLE TO STOP OR CONTROL WORRYING: SEVERAL DAYS
4. TROUBLE RELAXING: SEVERAL DAYS

## 2020-04-23 ASSESSMENT — MIFFLIN-ST. JEOR: SCORE: 2009.86

## 2020-04-23 ASSESSMENT — PATIENT HEALTH QUESTIONNAIRE - PHQ9: SUM OF ALL RESPONSES TO PHQ QUESTIONS 1-9: 10

## 2020-04-23 NOTE — PATIENT INSTRUCTIONS
Stop Lexapro.  Start Zoloft 50 mg daily.  May need to increase dose.   Trial of Trazodone  mg as needed.  Start 1 new medication at a time so you know how you react, side effects, etc.  Stress reduction as able - meditation, yoga/exercise, counseling/therapy.    Restart Losartan 25 mg daily for BP.  Recheck BP and labs within 1 month, sooner if concerns.  Please my chart message an update with readings in 2 weeks.    Follow up 1 month - sooner if concerns.

## 2020-04-24 ASSESSMENT — ANXIETY QUESTIONNAIRES: GAD7 TOTAL SCORE: 5

## 2020-05-20 NOTE — PROGRESS NOTES
"Vannessa Molina is a 44 year old female who is being evaluated via a billable telephone visit.      The patient has been notified of following:     \"This telephone visit will be conducted via a call between you and your physician/provider. We have found that certain health care needs can be provided without the need for a physical exam.  This service lets us provide the care you need with a short phone conversation.  If a prescription is necessary we can send it directly to your pharmacy.  If lab work is needed we can place an order for that and you can then stop by our lab to have the test done at a later time.    Telephone visits are billed at different rates depending on your insurance coverage. During this emergency period, for some insurers they may be billed the same as an in-person visit.  Please reach out to your insurance provider with any questions.    If during the course of the call the physician/provider feels a telephone visit is not appropriate, you will not be charged for this service.\"    Patient has given verbal consent for Telephone visit?  Yes    What phone number would you like to be contacted at? [phone]    How would you like to obtain your AVS? Liz Woo     Vannessa Molina is a 44 year old female who presents via phone visit today for the following health issues:    HPI  Hypertension Follow-up      Do you check your blood pressure regularly outside of the clinic? Yes     Are you following a low salt diet? No    Are your blood pressures ever more than 140 on the top number (systolic) OR more   than 90 on the bottom number (diastolic), for example 140/90? Yes, a few of       How many servings of fruits and vegetables do you eat daily?  4 or more    On average, how many sweetened beverages do you drink each day (Examples: soda, juice, sweet tea, etc.  Do NOT count diet or artificially sweetened beverages)?   0    How many days per week do you exercise enough to make your heart beat " faster? 5    How many minutes a day do you exercise enough to make your heart beat faster? 30 - 60    How many days per week do you miss taking your medication? 0    Due for CMP.  Prior Losartan.  Off since 9/2019.  Prior Lisinopril 10 mg.  Uses CPAP for TROY.  Restarted Losartan last visit.  See home readings scanned in [facility] 5/15/2020.  No lows.  No dizziness or light headed.        Depression Followup    How are you doing with your depression since your last visit? Improved     Are you having other symptoms that might be associated with depression? Yes:  headache over right eye for 3 days  resolved    Have you had a significant life event?  No     Are you feeling anxious or having panic attacks?   No    Do you have any concerns with your use of alcohol or other drugs? No     Added Zoloft 4/2020 and added Trazodone (prior zombie effect with Vistaril)    Side effects from Trazodone - so stopped    Less irritable since starting the zoloft again; wants to continue at current dosing        Social History     Tobacco Use     Smoking status: Former Smoker     Types: Cigarettes     Last attempt to quit: 11/30/2004     Years since quitting: 15.4     Smokeless tobacco: Never Used     Tobacco comment: Tried to quit   Substance Use Topics     Alcohol use: Yes     Alcohol/week: 0.0 standard drinks     Comment: 4 beers monthly     Drug use: No     PHQ 10/10/2019 4/23/2020 5/21/2020   PHQ-9 Total Score 7 10 4   Q9: Thoughts of better off dead/self-harm past 2 weeks Not at all Not at all Not at all     NIRAJ-7 SCORE 5/9/2019 10/10/2019 4/23/2020   Total Score 0 3 5     Last PHQ-9 5/21/2020   1.  Little interest or pleasure in doing things 1   2.  Feeling down, depressed, or hopeless 0   3.  Trouble falling or staying asleep, or sleeping too much 1   4.  Feeling tired or having little energy 1   5.  Poor appetite or overeating 1   6.  Feeling bad about yourself 0   7.  Trouble concentrating 0   8.  Moving slowly or restless 0    Q9: Thoughts of better off dead/self-harm past 2 weeks 0   PHQ-9 Total Score 4   Difficulty at work, home, or with people Somewhat difficult     NIRAJ-7  2020   1. Feeling nervous, anxious, or on edge 0   2. Not being able to stop or control worrying 1   3. Worrying too much about different things 1   4. Trouble relaxing 1   5. Being so restless that it is hard to sit still 0   6. Becoming easily annoyed or irritable 1   7. Feeling afraid, as if something awful might happen 1   NIRAJ-7 Total Score 5   If you checked any problems, how difficult have they made it for you to do your work, take care of things at home, or get along with other people? Somewhat difficult       Suicide Assessment Five-step Evaluation and Treatment (SAFE-T)      Current Outpatient Medications   Medication     cholecalciferol (VITAMIN D3) 5000 units (125 mcg) capsule     Docusate Sodium (GENTLE STOOL SOFTENER PO)     Flaxseed, Linseed, (FLAX SEED OIL PO)     losartan (COZAAR) 25 MG tablet     LYSINE PO     medroxyPROGESTERone (PROVERA) 10 MG tablet     metFORMIN (GLUCOPHAGE-XR) 500 MG 24 hr tablet     sertraline (ZOLOFT) 50 MG tablet     No current facility-administered medications for this visit.        Patient Active Problem List   Diagnosis     Obesity     Major depressive disorder, recurrent episode (H)     GERD (gastroesophageal reflux diseae)     Benign essential hypertension     Vitamin D deficiency     Morbid obesity (H)     Menorrhagia with regular cycle--US normal--PROVERA daily---10/2019     Insulin resistance syndrome     Past Surgical History:   Procedure Laterality Date     bilateral tubal ligation        section  ,         oligohydramnios       Tonsillectomy         Social History     Tobacco Use     Smoking status: Former Smoker     Types: Cigarettes     Last attempt to quit: 2004     Years since quitting: 15.4     Smokeless tobacco: Never Used     Tobacco comment: Tried to quit   Substance Use  Topics     Alcohol use: Yes     Alcohol/week: 0.0 standard drinks     Comment: 4 beers monthly     Family History   Problem Relation Age of Onset     Cancer Mother         Cervical     Depression Father      Diabetes Father      Hypertension Father      Other - See Comments Father         Cholecystectomy/Rheumatoid arthritis     Other - See Comments Sister         Endometriosis     Asthma No family hx of      Thyroid Disease No family hx of      Coronary Artery Disease No family hx of            Reviewed and updated as needed this visit by Provider  Tobacco  Allergies  Meds  Med Hx  Surg Hx  Fam Hx  Soc Hx        Review of Systems   Constitutional, HEENT, cardiovascular, pulmonary, gi and gu systems are negative, except as otherwise noted.       Objective   Reported vitals:  BP (!) 123/95   Pulse 80   Temp 99.6  F (37.6  C)   Wt 132 kg (291 lb)   BMI 44.25 kg/m     alert and no distress  PSYCH: Alert and oriented times 3; coherent speech, normal   rate and volume, able to articulate logical thoughts, able   to abstract reason, no tangential thoughts, no hallucinations   or delusions  Her affect is normal  RESP: No cough, no audible wheezing, able to talk in full sentences  Remainder of exam unable to be completed due to telephone visits    Diagnostic Test Results:  Labs reviewed in Epic        Assessment/Plan:  1. Benign essential hypertension  Responding well to the Losartan.  Majority of readings at goal.  Continue home monitoring.  CMP future lab within next couple weeks.  Recheck in office in 2 months.  - Comprehensive metabolic panel (BMP + Alb, Alk Phos, ALT, AST, Total. Bili, TP); Future    2. Episode of recurrent major depressive disorder, unspecified depression episode severity (H)  Stable with Zoloft.  Continue current dose.    3. Insulin resistance syndrome  Add a1c to next lab draw.  - Hemoglobin A1c; Future  - Comprehensive metabolic panel (BMP + Alb, Alk Phos, ALT, AST, Total. Bili, TP);  Future    Return in about 1 month (around 6/21/2020) for BP Recheck, depression/anxiety.      Phone call duration:  7:18 minutes    Aaliyah Sethi MD

## 2020-05-21 ENCOUNTER — VIRTUAL VISIT (OUTPATIENT)
Dept: FAMILY MEDICINE | Facility: OTHER | Age: 45
End: 2020-05-21
Attending: FAMILY MEDICINE
Payer: COMMERCIAL

## 2020-05-21 VITALS
BODY MASS INDEX: 44.25 KG/M2 | TEMPERATURE: 99.6 F | HEART RATE: 80 BPM | DIASTOLIC BLOOD PRESSURE: 95 MMHG | SYSTOLIC BLOOD PRESSURE: 123 MMHG | WEIGHT: 291 LBS

## 2020-05-21 DIAGNOSIS — I10 BENIGN ESSENTIAL HYPERTENSION: Primary | ICD-10-CM

## 2020-05-21 DIAGNOSIS — E88.810 INSULIN RESISTANCE SYNDROME: ICD-10-CM

## 2020-05-21 DIAGNOSIS — F33.9 EPISODE OF RECURRENT MAJOR DEPRESSIVE DISORDER, UNSPECIFIED DEPRESSION EPISODE SEVERITY (H): ICD-10-CM

## 2020-05-21 PROCEDURE — 99213 OFFICE O/P EST LOW 20 MIN: CPT | Mod: TEL | Performed by: FAMILY MEDICINE

## 2020-05-21 ASSESSMENT — PAIN SCALES - GENERAL: PAINLEVEL: NO PAIN (0)

## 2020-05-21 ASSESSMENT — PATIENT HEALTH QUESTIONNAIRE - PHQ9: SUM OF ALL RESPONSES TO PHQ QUESTIONS 1-9: 4

## 2020-05-21 NOTE — PATIENT INSTRUCTIONS
Continue Zoloft and Cozaar.  Labs to be scheduled in next couple weeks.  Office visit follow up 2 months, sooner if concern.

## 2020-05-21 NOTE — NURSING NOTE
"Chief Complaint   Patient presents with     Hypertension     Depression       Initial BP (!) 123/95   Pulse 80   Temp 99.6  F (37.6  C)   Wt 132 kg (291 lb)   BMI 44.25 kg/m   Estimated body mass index is 44.25 kg/m  as calculated from the following:    Height as of 4/23/20: 1.727 m (5' 8\").    Weight as of this encounter: 132 kg (291 lb).  Medication Reconciliation: complete  Soumya Navarrete MA  "

## 2020-06-01 DIAGNOSIS — E88.810 INSULIN RESISTANCE SYNDROME: ICD-10-CM

## 2020-06-01 DIAGNOSIS — I10 BENIGN ESSENTIAL HYPERTENSION: ICD-10-CM

## 2020-06-01 LAB
ALBUMIN SERPL-MCNC: 3.6 G/DL (ref 3.4–5)
ALP SERPL-CCNC: 55 U/L (ref 40–150)
ALT SERPL W P-5'-P-CCNC: 28 U/L (ref 0–50)
ANION GAP SERPL CALCULATED.3IONS-SCNC: 3 MMOL/L (ref 3–14)
AST SERPL W P-5'-P-CCNC: 11 U/L (ref 0–45)
BILIRUB SERPL-MCNC: 0.3 MG/DL (ref 0.2–1.3)
BUN SERPL-MCNC: 6 MG/DL (ref 7–30)
CALCIUM SERPL-MCNC: 8.7 MG/DL (ref 8.5–10.1)
CHLORIDE SERPL-SCNC: 106 MMOL/L (ref 94–109)
CO2 SERPL-SCNC: 27 MMOL/L (ref 20–32)
CREAT SERPL-MCNC: 0.57 MG/DL (ref 0.52–1.04)
EST. AVERAGE GLUCOSE BLD GHB EST-MCNC: 103 MG/DL
GFR SERPL CREATININE-BSD FRML MDRD: >90 ML/MIN/{1.73_M2}
GLUCOSE SERPL-MCNC: 83 MG/DL (ref 70–99)
HBA1C MFR BLD: 5.2 % (ref 0–5.6)
POTASSIUM SERPL-SCNC: 3.7 MMOL/L (ref 3.4–5.3)
PROT SERPL-MCNC: 7.2 G/DL (ref 6.8–8.8)
SODIUM SERPL-SCNC: 136 MMOL/L (ref 133–144)

## 2020-06-01 PROCEDURE — 36415 COLL VENOUS BLD VENIPUNCTURE: CPT | Performed by: FAMILY MEDICINE

## 2020-06-01 PROCEDURE — 80053 COMPREHEN METABOLIC PANEL: CPT | Performed by: FAMILY MEDICINE

## 2020-06-01 PROCEDURE — 83036 HEMOGLOBIN GLYCOSYLATED A1C: CPT | Performed by: FAMILY MEDICINE

## 2020-06-10 ENCOUNTER — MYC MEDICAL ADVICE (OUTPATIENT)
Dept: FAMILY MEDICINE | Facility: OTHER | Age: 45
End: 2020-06-10

## 2020-07-08 ENCOUNTER — ANCILLARY PROCEDURE (OUTPATIENT)
Dept: MAMMOGRAPHY | Facility: OTHER | Age: 45
End: 2020-07-08
Attending: FAMILY MEDICINE
Payer: COMMERCIAL

## 2020-07-08 DIAGNOSIS — Z12.31 VISIT FOR SCREENING MAMMOGRAM: ICD-10-CM

## 2020-07-08 PROCEDURE — 77063 BREAST TOMOSYNTHESIS BI: CPT | Mod: TC

## 2020-07-08 PROCEDURE — 77067 SCR MAMMO BI INCL CAD: CPT | Mod: TC

## 2020-07-09 ENCOUNTER — MYC MEDICAL ADVICE (OUTPATIENT)
Dept: FAMILY MEDICINE | Facility: OTHER | Age: 45
End: 2020-07-09

## 2020-07-09 DIAGNOSIS — F33.9 EPISODE OF RECURRENT MAJOR DEPRESSIVE DISORDER, UNSPECIFIED DEPRESSION EPISODE SEVERITY (H): ICD-10-CM

## 2020-07-10 RX ORDER — SERTRALINE HYDROCHLORIDE 100 MG/1
100 TABLET, FILM COATED ORAL DAILY
Qty: 90 TABLET | Refills: 0 | Status: SHIPPED | OUTPATIENT
Start: 2020-07-10 | End: 2020-10-23

## 2020-07-10 NOTE — TELEPHONE ENCOUNTER
See response to patient.  Please route PHQ and NIRAJ to complete.  Please schedule for 1-2 month follow up to assess response to med change.

## 2020-07-10 NOTE — TELEPHONE ENCOUNTER
Sent PHQ9 and NIRAJ over HASH. Send message to [facility] to schedule apt. Marcella Mantilla LPN

## 2020-08-12 RX ORDER — TRAZODONE HYDROCHLORIDE 50 MG/1
TABLET, FILM COATED ORAL
COMMUNITY
Start: 2020-04-23 | End: 2021-01-13

## 2020-08-12 NOTE — PROGRESS NOTES
Subjective     Vannessa Molina is a 45 year old female who presents to clinic today for the following health issues:    HPI       Hypertension Follow-up      Do you check your blood pressure regularly outside of the clinic? Yes     Are you following a low salt diet? No    Are your blood pressures ever more than 140 on the top number (systolic) OR more   than 90 on the bottom number (diastolic), for example 140/90? Yes   Losartan restarted months ago.  Home readings max ; average 110s; DBP <90  No light headed, dizzy.  No swelling, chest pain, dyspnea, vision changes.  Did have CMP after restarting.    Depression and Anxiety Follow-Up    How are you doing with your depression since your last visit? Improved     How are you doing with your anxiety since your last visit?  Improved     Are you having other symptoms that might be associated with depression or anxiety? No    Have you had a significant life event? No     Do you have any concerns with your use of alcohol or other drugs? No     Zoloft - improved with going up to 100 mg - after my chart message 7/9/2020    No new side effects    Social History     Tobacco Use     Smoking status: Former Smoker     Types: Cigarettes     Last attempt to quit: 11/30/2004     Years since quitting: 15.7     Smokeless tobacco: Never Used     Tobacco comment: Tried to quit   Substance Use Topics     Alcohol use: Yes     Alcohol/week: 0.0 standard drinks     Comment: 4 beers monthly     Drug use: No     PHQ 5/21/2020 7/11/2020 8/17/2020   PHQ-9 Total Score 4 9 4   Q9: Thoughts of better off dead/self-harm past 2 weeks Not at all Not at all Not at all     NIRAJ-7 SCORE 4/23/2020 7/11/2020 8/17/2020   Total Score - 7 (mild anxiety) -   Total Score 5 7 1     Last PHQ-9 8/17/2020   1.  Little interest or pleasure in doing things 1   2.  Feeling down, depressed, or hopeless 0   3.  Trouble falling or staying asleep, or sleeping too much 1   4.  Feeling tired or having little energy 1    5.  Poor appetite or overeating 1   6.  Feeling bad about yourself 0   7.  Trouble concentrating 0   8.  Moving slowly or restless 0   Q9: Thoughts of better off dead/self-harm past 2 weeks 0   PHQ-9 Total Score 4   Difficulty at work, home, or with people Somewhat difficult     NIRAJ-7  8/17/2020   1. Feeling nervous, anxious, or on edge 0   2. Not being able to stop or control worrying 0   3. Worrying too much about different things 0   4. Trouble relaxing 0   5. Being so restless that it is hard to sit still 0   6. Becoming easily annoyed or irritable 1   7. Feeling afraid, as if something awful might happen 0   NIRAJ-7 Total Score 1   If you checked any problems, how difficult have they made it for you to do your work, take care of things at home, or get along with other people? Not difficult at all       Suicide Assessment Five-step Evaluation and Treatment (SAFE-T)      How many servings of fruits and vegetables do you eat daily?  2-3    On average, how many sweetened beverages do you drink each day (Examples: soda, juice, sweet tea, etc.  Do NOT count diet or artificially sweetened beverages)?   0    How many days per week do you exercise enough to make your heart beat faster? 3 or less    How many minutes a day do you exercise enough to make your heart beat faster? 30 - 60    How many days per week do you miss taking your medication? 0        Current Outpatient Medications   Medication     cholecalciferol (VITAMIN D3) 5000 units (125 mcg) capsule     Docusate Sodium (GENTLE STOOL SOFTENER PO)     ferrous sulfate (FEROSUL) 325 (65 Fe) MG tablet     Flaxseed, Linseed, (FLAX SEED OIL PO)     losartan (COZAAR) 25 MG tablet     LYSINE PO     medroxyPROGESTERone (PROVERA) 10 MG tablet     metFORMIN (GLUCOPHAGE-XR) 500 MG 24 hr tablet     sertraline (ZOLOFT) 100 MG tablet     traZODone (DESYREL) 50 MG tablet     No current facility-administered medications for this visit.        Patient Active Problem List  "  Diagnosis     Obesity     Major depressive disorder, recurrent episode (H)     GERD (gastroesophageal reflux diseae)     Benign essential hypertension     Vitamin D deficiency     Morbid obesity (H)     Menorrhagia with regular cycle--US normal--PROVERA daily---10/2019     Insulin resistance syndrome     Past Surgical History:   Procedure Laterality Date     bilateral tubal ligation        section  ,         oligohydramnios       Tonsillectomy         Social History     Tobacco Use     Smoking status: Former Smoker     Types: Cigarettes     Last attempt to quit: 2004     Years since quitting: 15.7     Smokeless tobacco: Never Used     Tobacco comment: Tried to quit   Substance Use Topics     Alcohol use: Yes     Alcohol/week: 0.0 standard drinks     Comment: 4 beers monthly     Family History   Problem Relation Age of Onset     Cancer Mother         Cervical     Depression Father      Diabetes Father      Hypertension Father      Other - See Comments Father         Cholecystectomy/Rheumatoid arthritis     Other - See Comments Sister         Endometriosis     Asthma No family hx of      Thyroid Disease No family hx of      Coronary Artery Disease No family hx of            Reviewed and updated as needed this visit by Provider  Tobacco  Allergies  Meds  Med Hx  Surg Hx  Fam Hx  Soc Hx        Review of Systems   Constitutional, HEENT, cardiovascular, pulmonary, gi and gu systems are negative, except as otherwise noted.      Objective    /78 (BP Location: Right arm, Patient Position: Sitting, Cuff Size: Adult Regular)   Pulse 71   Temp 98.6  F (37  C) (Tympanic)   Ht 1.664 m (5' 5.5\")   Wt 130.2 kg (287 lb)   SpO2 98%   BMI 47.03 kg/m    Body mass index is 47.03 kg/m .  Physical Exam   GENERAL: healthy, alert and no distress  NECK: no adenopathy, no asymmetry, masses, or scars and thyroid normal to palpation  RESP: lungs clear to auscultation - no rales, rhonchi or " "wheezes  CV: regular rate and rhythm, normal S1 S2, no S3 or S4, no murmur, click or rub, no peripheral edema and peripheral pulses strong  MS: no gross musculoskeletal defects noted, no edema  PSYCH: mentation appears normal, affect normal/bright    Diagnostic Test Results:  Labs reviewed in Epic        Assessment & Plan       ICD-10-CM    1. Benign essential hypertension  I10    2. Episode of recurrent major depressive disorder, unspecified depression episode severity (H)  F33.9    3. Morbid obesity (H)  E66.01         BMI:   Estimated body mass index is 47.03 kg/m  as calculated from the following:    Height as of this encounter: 1.664 m (5' 5.5\").    Weight as of this encounter: 130.2 kg (287 lb).   Weight management plan: Discussed healthy diet and exercise guidelines    BP controlled. Continue Losartan.  CMP normal 6/2020.  Mood stable with increased dose of Zoloft.  Continue current dose.    Patient Instructions   Continue current medications.  Routine follow up.      No follow-ups on file.    Aaliyah Sethi MD  [facility] [place]    "

## 2020-08-17 ENCOUNTER — OFFICE VISIT (OUTPATIENT)
Dept: FAMILY MEDICINE | Facility: OTHER | Age: 45
End: 2020-08-17
Attending: FAMILY MEDICINE
Payer: COMMERCIAL

## 2020-08-17 VITALS
BODY MASS INDEX: 46.12 KG/M2 | SYSTOLIC BLOOD PRESSURE: 108 MMHG | OXYGEN SATURATION: 98 % | HEIGHT: 66 IN | WEIGHT: 287 LBS | TEMPERATURE: 98.6 F | HEART RATE: 71 BPM | DIASTOLIC BLOOD PRESSURE: 78 MMHG

## 2020-08-17 DIAGNOSIS — F33.9 EPISODE OF RECURRENT MAJOR DEPRESSIVE DISORDER, UNSPECIFIED DEPRESSION EPISODE SEVERITY (H): ICD-10-CM

## 2020-08-17 DIAGNOSIS — E66.01 MORBID OBESITY (H): ICD-10-CM

## 2020-08-17 DIAGNOSIS — I10 BENIGN ESSENTIAL HYPERTENSION: Primary | ICD-10-CM

## 2020-08-17 PROCEDURE — 99213 OFFICE O/P EST LOW 20 MIN: CPT | Performed by: FAMILY MEDICINE

## 2020-08-17 RX ORDER — FERROUS SULFATE 325(65) MG
325 TABLET ORAL
COMMUNITY
End: 2022-02-16

## 2020-08-17 ASSESSMENT — ANXIETY QUESTIONNAIRES
5. BEING SO RESTLESS THAT IT IS HARD TO SIT STILL: NOT AT ALL
2. NOT BEING ABLE TO STOP OR CONTROL WORRYING: NOT AT ALL
4. TROUBLE RELAXING: NOT AT ALL
7. FEELING AFRAID AS IF SOMETHING AWFUL MIGHT HAPPEN: NOT AT ALL
GAD7 TOTAL SCORE: 1
1. FEELING NERVOUS, ANXIOUS, OR ON EDGE: NOT AT ALL
6. BECOMING EASILY ANNOYED OR IRRITABLE: SEVERAL DAYS
3. WORRYING TOO MUCH ABOUT DIFFERENT THINGS: NOT AT ALL
IF YOU CHECKED OFF ANY PROBLEMS ON THIS QUESTIONNAIRE, HOW DIFFICULT HAVE THESE PROBLEMS MADE IT FOR YOU TO DO YOUR WORK, TAKE CARE OF THINGS AT HOME, OR GET ALONG WITH OTHER PEOPLE: NOT DIFFICULT AT ALL

## 2020-08-17 ASSESSMENT — MIFFLIN-ST. JEOR: SCORE: 1960.63

## 2020-08-17 ASSESSMENT — PAIN SCALES - GENERAL: PAINLEVEL: NO PAIN (0)

## 2020-08-17 ASSESSMENT — PATIENT HEALTH QUESTIONNAIRE - PHQ9: SUM OF ALL RESPONSES TO PHQ QUESTIONS 1-9: 4

## 2020-08-17 NOTE — NURSING NOTE
"Chief Complaint   Patient presents with     Hypertension     Depression     Anxiety       Initial /78 (BP Location: Right arm, Patient Position: Sitting, Cuff Size: Adult Regular)   Pulse 71   Temp 98.6  F (37  C) (Tympanic)   Ht 1.664 m (5' 5.5\")   Wt 130.2 kg (287 lb)   SpO2 98%   BMI 47.03 kg/m   Estimated body mass index is 47.03 kg/m  as calculated from the following:    Height as of this encounter: 1.664 m (5' 5.5\").    Weight as of this encounter: 130.2 kg (287 lb).  Medication Reconciliation: complete  Glo Pham LPN  "

## 2020-08-18 ASSESSMENT — ANXIETY QUESTIONNAIRES: GAD7 TOTAL SCORE: 1

## 2020-09-28 DIAGNOSIS — E66.01 MORBID OBESITY (H): Primary | ICD-10-CM

## 2020-09-28 RX ORDER — METFORMIN HCL 500 MG
TABLET, EXTENDED RELEASE 24 HR ORAL
Qty: 124 TABLET | Refills: 0 | Status: SHIPPED | OUTPATIENT
Start: 2020-09-28 | End: 2020-11-11

## 2020-09-28 NOTE — TELEPHONE ENCOUNTER
metformin      Last Written Prescription Date:  historical  Last Fill Quantity: ,   # refills:   Last Office Visit: 8/17/20  Future Office visit:

## 2020-10-22 DIAGNOSIS — F33.9 EPISODE OF RECURRENT MAJOR DEPRESSIVE DISORDER, UNSPECIFIED DEPRESSION EPISODE SEVERITY (H): ICD-10-CM

## 2020-10-23 RX ORDER — SERTRALINE HYDROCHLORIDE 100 MG/1
TABLET, FILM COATED ORAL
Qty: 90 TABLET | Refills: 0 | Status: SHIPPED | OUTPATIENT
Start: 2020-10-23 | End: 2021-01-13

## 2020-11-10 DIAGNOSIS — E66.01 MORBID OBESITY (H): ICD-10-CM

## 2020-11-10 DIAGNOSIS — I10 BENIGN ESSENTIAL HYPERTENSION: ICD-10-CM

## 2020-11-10 DIAGNOSIS — N92.0 MENORRHAGIA WITH REGULAR CYCLE: ICD-10-CM

## 2020-11-10 NOTE — TELEPHONE ENCOUNTER
Provera      Last Written Prescription Date:  10/23/19  Last Fill Quantity: 30,   # refills: 11  Last Office Visit: 08/17/20  Future Office visit:

## 2020-11-10 NOTE — TELEPHONE ENCOUNTER
Cozaar      Last Written Prescription Date:  04/23/20  Last Fill Quantity: 90,   # refills: 1  Last Office Visit: 08/17/20  Future Office visit:       Last /78    Metformin      Last Written Prescription Date:  09/28/20  Last Fill Quantity: 124,   # refills: 0  Last Office Visit: 08/17/20  Future Office visit:       Last A1C 06/01/20

## 2020-11-11 RX ORDER — LOSARTAN POTASSIUM 25 MG/1
TABLET ORAL
Qty: 90 TABLET | Refills: 0 | Status: SHIPPED | OUTPATIENT
Start: 2020-11-11 | End: 2021-01-13

## 2020-11-11 RX ORDER — MEDROXYPROGESTERONE ACETATE 10 MG
TABLET ORAL
Qty: 30 TABLET | Refills: 0 | Status: SHIPPED | OUTPATIENT
Start: 2020-11-11 | End: 2020-12-14

## 2020-11-11 RX ORDER — METFORMIN HCL 500 MG
TABLET, EXTENDED RELEASE 24 HR ORAL
Qty: 124 TABLET | Refills: 0 | Status: SHIPPED | OUTPATIENT
Start: 2020-11-11 | End: 2020-12-14

## 2020-12-14 ENCOUNTER — HEALTH MAINTENANCE LETTER (OUTPATIENT)
Age: 45
End: 2020-12-14

## 2021-01-12 NOTE — PROGRESS NOTES
SUBJECTIVE:   CC: Vannessa Molina is an 45 year old woman who presents for preventive health visit.     Patient has been advised of split billing requirements and indicates understanding: Yes  Healthy Habits:    Do you get at least three servings of calcium containing foods daily (dairy, green leafy vegetables, etc.)? yes    Amount of exercise or daily activities, outside of work: 0 day(s) per week    Problems taking medications regularly No    Medication side effects: No    Have you had an eye exam in the past two years? yes    Do you see a dentist twice per year? yes    Do you have sleep apnea, excessive snoring or daytime drowsiness?yes    Insulin resistance - on metformin - prior weight program; used only 2 tabs most days instead of 4    Due for flu shot     Last pap/hpv 2017, due 2022    Mammogram 2020    Due for vit D, lipids    Joint weight watchers x 1 month - lost 4 pounds    Home gym basement    LMP 2020; then got it his month; typically very light    Provera daily - per GYN    Left ear feels plugged; no drainage; tried peroxide      Hypertension Follow-up    Do you check your blood pressure regularly outside of the clinic? Yes     Are you following a low salt diet? No    Are your blood pressures ever more than 140 on the top number (systolic) OR more   than 90 on the bottom number (diastolic), for example 140/90? No    Depression Followup    How are you doing with your depression since your last visit? No change    Are you having other symptoms that might be associated with depression? No    Have you had a significant life event?  Grief or Loss     Are you feeling anxious or having panic attacks?   No    Do you have any concerns with your use of alcohol or other drugs? No     Dad passed 20    Social History     Tobacco Use     Smoking status: Former Smoker     Types: Cigarettes     Quit date: 2004     Years since quittin.1     Smokeless tobacco: Never Used     Tobacco comment: Tried  to quit   Substance Use Topics     Alcohol use: Yes     Alcohol/week: 0.0 standard drinks     Comment: 4 beers monthly     Drug use: No     PHQ 2020   PHQ-9 Total Score 4 9 4   Q9: Thoughts of better off dead/self-harm past 2 weeks Not at all Not at all Not at all     NIRAJ-7 SCORE 2020   Total Score - 7 (mild anxiety) -   Total Score 5 7 1         Suicide Assessment Five-step Evaluation and Treatment (SAFE-T)      Today's PHQ-2 Score:   PHQ-2 (  [employer]) 2013   Q1: Little interest or pleasure in doing things 2   Q2: Feeling down, depressed or hopeless 2   PHQ-2 Score 4       Abuse: Current or Past(Physical, Sexual or Emotional)- No  Do you feel safe in your environment? Yes        Social History     Tobacco Use     Smoking status: Former Smoker     Types: Cigarettes     Quit date: 2004     Years since quittin.1     Smokeless tobacco: Never Used     Tobacco comment: Tried to quit   Substance Use Topics     Alcohol use: Yes     Alcohol/week: 0.0 standard drinks     Comment: 4 beers monthly     If you drink alcohol do you typically have >3 drinks per day or >7 drinks per week? No                     Reviewed orders with patient.  Reviewed health maintenance and updated orders accordingly - Yes  Current Outpatient Medications   Medication     cholecalciferol (VITAMIN D3) 5000 units (125 mcg) capsule     Docusate Sodium (GENTLE STOOL SOFTENER PO)     ferrous sulfate (FEROSUL) 325 (65 Fe) MG tablet     Flaxseed, Linseed, (FLAX SEED OIL PO)     Garcinia Cambogia-Chromium 500-200 MG-MCG TABS     losartan (COZAAR) 25 MG tablet     LYSINE PO     medroxyPROGESTERone (PROVERA) 10 MG tablet     Melatonin 10 MG TABS tablet     metFORMIN (GLUCOPHAGE-XR) 500 MG 24 hr tablet     sertraline (ZOLOFT) 100 MG tablet     No current facility-administered medications for this visit.        Lab work is in process  Labs reviewed in [facility]    Mammogram Screening: Patient under  age 50, mutual decision reflected in health maintenance.      Pertinent mammograms are reviewed under the imaging tab.  History of abnormal Pap smear:   NO - age 30-65 PAP every 5 years with negative HPV co-testing recommended  Last 3 Pap and HPV Results:   PAP / HPV Latest Ref Rng & Units 2017   PAP - NIL NIL   HPV 16 DNA NEG Negative -   HPV 18 DNA NEG Negative -   OTHER HR HPV NEG Negative -     PAP / HPV Latest Ref Rng & Units 2017   PAP - NIL NIL   HPV 16 DNA NEG Negative -   HPV 18 DNA NEG Negative -   OTHER HR HPV NEG Negative -     Reviewed and updated as needed this visit by clinical staff  Tobacco  Allergies  Meds   Med Hx  Surg Hx  Fam Hx  Soc Hx        Reviewed and updated as needed this visit by Provider  Tobacco  Allergies  Meds   Med Hx  Surg Hx  Fam Hx  Soc Hx       Past Medical History:   Diagnosis Date     Benign essential hypertension 2015     GERD (gastroesophageal reflux diseae) 2011     Major depressive affective disorder, recurrent episode, unspecified 2011     Obesity  2011     Vitamin D deficiency 2015      Past Surgical History:   Procedure Laterality Date     bilateral tubal ligation        section  ,         oligohydramnios       Tonsillectomy         ROS:  CONSTITUTIONAL:POSITIVE  for weight loss  INTEGUMENTARU/SKIN: NEGATIVE for worrisome rashes, moles or lesions  EYES: NEGATIVE for vision changes or irritation  ENT: POSITIVE for ear plugging, left; otherwise negative  RESP: NEGATIVE for significant cough or SOB  BREAST: NEGATIVE for masses, tenderness or discharge  CV: NEGATIVE for chest pain, palpitations or peripheral edema  GI: NEGATIVE for nausea, abdominal pain, heartburn, or change in bowel habits  : NEGATIVE for unusual urinary or vaginal symptoms. Periods are regular.  MUSCULOSKELETAL: NEGATIVE for significant arthralgias or myalgia  NEURO: NEGATIVE for weakness, dizziness or  "paresthesias  PSYCHIATRIC: POSITIVE fordepressed mood and stress and grief    OBJECTIVE:   /86 (BP Location: Right arm, Patient Position: Chair, Cuff Size: Adult Large)   Pulse 91   Temp 98.3  F (36.8  C) (Tympanic)   Ht 1.689 m (5' 6.5\")   Wt 128.7 kg (283 lb 12.8 oz)   SpO2 98%   BMI 45.12 kg/m    EXAM:  GENERAL: alert, no distress and obese  EYES: Eyes grossly normal to inspection, PERRL and conjunctivae and sclerae normal  HENT: ear canals and TM's normal, nose and mouth without ulcers or lesions  NECK: no adenopathy, no asymmetry, masses, or scars and thyroid normal to palpation  RESP: lungs clear to auscultation - no rales, rhonchi or wheezes  BREAST: normal without masses, tenderness or nipple discharge and no palpable axillary masses or adenopathy  CV: regular rate and rhythm, normal S1 S2, no S3 or S4, no murmur, click or rub, no peripheral edema and peripheral pulses strong  ABDOMEN: soft, nontender, no hepatosplenomegaly, no masses and bowel sounds normal   (female): patient deferred  MS: no gross musculoskeletal defects noted, no edema  SKIN: no suspicious lesions or rashes  NEURO: Normal strength and tone, mentation intact and speech normal  PSYCH: mentation appears normal, affect normal/bright    Diagnostic Test Results:  Labs reviewed in Epic  Future labs ordered    ASSESSMENT/PLAN:       ICD-10-CM    1. Routine general medical examination at a health care facility  Z00.00 INFLUENZA VACCINE IM > 6 MONTHS VALENT IIV4 [28738]     Vitamin D Deficiency     Lipid Profile (Chol, Trig, HDL, LDL calc)     Comprehensive metabolic panel (BMP + Alb, Alk Phos, ALT, AST, Total. Bili, TP)     CBC with platelets and differential   2. Morbid obesity (H)  E66.01 Comprehensive metabolic panel (BMP + Alb, Alk Phos, ALT, AST, Total. Bili, TP)     metFORMIN (GLUCOPHAGE-XR) 500 MG 24 hr tablet   3. Vitamin D deficiency  E55.9 Vitamin D Deficiency   4. Benign essential hypertension  I10 Comprehensive metabolic " panel (BMP + Alb, Alk Phos, ALT, AST, Total. Bili, TP)     losartan (COZAAR) 25 MG tablet   5. Episode of recurrent major depressive disorder, unspecified depression episode severity (H)  F33.9 MENTAL HEALTH REFERRAL  - Adult; Outpatient Treatment; Individual/Couples/Family/Group Therapy/Health Psychology; Range: [facility] [phone]; We will contact you to schedule the appointment or please call ...     sertraline (ZOLOFT) 100 MG tablet   6. Grief  F43.21 MENTAL HEALTH REFERRAL  - Adult; Outpatient Treatment; Individual/Couples/Family/Group Therapy/Health Psychology; Range: [facility] [phone]; We will contact you to schedule the appointment or please call ...   7. Insulin resistance syndrome  E88.81 Lipid Profile (Chol, Trig, HDL, LDL calc)     Comprehensive metabolic panel (BMP + Alb, Alk Phos, ALT, AST, Total. Bili, TP)     Hemoglobin A1c   8. Menorrhagia with regular cycle--US normal---10/2019  N92.0 medroxyPROGESTERone (PROVERA) 10 MG tablet     Return for fasting labs- call ahead to schedule.  Medications refilled.  Flu shot given.    Continue with weight watchers diet, as well as increased exercise goals.  Continue Metformin.    For ear symptoms - eustachian tube dysfunction - short term use x few days of decongestant such as Sudafed.  If ongoing - daily antihistamine such as Claritin and daily nasal steroid spray such as Flonase.  Can take time despite daily use.    PAP/hpv due 2022.  Mammogram up to date.    Patient has been advised of split billing requirements and indicates understanding: Yes     COUNSELING:   Reviewed preventive health counseling, as reflected in patient instructions       Regular exercise       Healthy diet/nutrition       Vision screening       Hearing screening       Immunizations    Vaccinated for: Influenza         Osteoporosis prevention/bone health    Estimated body mass index is 45.12 kg/m  as  "calculated from the following:    Height as of this encounter: 1.689 m (5' 6.5\").    Weight as of this encounter: 128.7 kg (283 lb 12.8 oz).    Weight management plan: Discussed healthy diet and exercise guidelines    She reports that she quit smoking about 16 years ago. Her smoking use included cigarettes. She has never used smokeless tobacco.      Counseling Resources:  ATP IV Guidelines  Pooled Cohorts Equation Calculator  Breast Cancer Risk Calculator  BRCA-Related Cancer Risk Assessment: FHS-7 Tool  FRAX Risk Assessment  ICSI Preventive Guidelines  Dietary Guidelines for Americans, 2010  USDA's MyPlate  ASA Prophylaxis  Lung CA Screening    Aaliyah Sethi MD  [facility] [place]  "

## 2021-01-12 NOTE — PATIENT INSTRUCTIONS
Return for fasting labs- call ahead to schedule.  Medications refilled.  Flu shot given.  Continue with weight watchers diet, as well as increased exercise goals.  For ear symptoms - short term use x few days of decongestant such as Sudafed.  If ongoing - daily antihistamine such as Claritin and daily nasal steroid spray such as Flonase.  Can take time despite daily use.      Preventive Health Recommendations  Female Ages 40 to 49    Yearly exam:     See your health care provider every year in order to  1. Review health changes.   2. Discuss preventive care.    3. Review your medicines if your doctor prescribed any.      Get a Pap test every three years (unless you have an abnormal result and your provider advises testing more often).      If you get Pap tests with HPV test, you only need to test every 5 years, unless you have an abnormal result. You do not need a Pap test if your uterus was removed (hysterectomy) and you have not had cancer.      You should be tested each year for STDs (sexually transmitted diseases), if you're at risk.     Ask your doctor if you should have a mammogram.      Have a colonoscopy (test for colon cancer) if someone in your family has had colon cancer or polyps before age 50.       Have a cholesterol test every 5 years.       Have a diabetes test (fasting glucose) after age 45. If you are at risk for diabetes, you should have this test every 3 years.    Shots: Get a flu shot each year. Get a tetanus shot every 10 years.     Nutrition:     Eat at least 5 servings of fruits and vegetables each day.    Eat whole-grain bread, whole-wheat pasta and brown rice instead of white grains and rice.    Get adequate Calcium and Vitamin D.      Lifestyle    Exercise at least 150 minutes a week (an average of 30 minutes a day, 5 days a week). This will help you control your weight and prevent disease.    Limit alcohol to one drink per day.    No smoking.     Wear sunscreen to prevent skin  cancer.    See your dentist every six months for an exam and cleaning.     Psychologists/ Counselors      [facility]   [phone]  [facility]   [phone]  [facility] [phone]  [facility] (kids) [phone]  [facility](teens) [phone]  [facility]  [phone]  [facility]  [phone]  [facility]  [phone]  [facility]  [phone]  [facility]  [phone]  [facility] counseling [phone]   [facility]   [phone]   [facility]    [phone]   [facility].   [phone]   (Elsi Zavala)   [facility]  [phone]  [facility] [phone]  [facility]  [phone]  [facility]  [phone]     [facility] [phone]      [facility] [phone]  [facility] counseling [phone]  [facility]   [phone]  [facility] (Jesús Mazariegos LMFT)                                                   [phone]  Jessica Sorenson  [phone]  [facility] [phone]  [facility]      [phone]  [facility]       [phone]  [facility]  [phone]  [facility]      [phone]     [place]  [facility]   [phone]  [facility]      [phone]  [facility] KENZIE Morris [phone]  [facility]      [phone]  [facility]  [phone]    [facility]                      [phone]    *Facilities in bold italics indicate medication management  Services are offered.    Crisis support  Mobile crisis line- [phone]  [facility] Range: Free online resources including therapy for Mental Health and substance use problems: Http://thriverange.org    Crisis Text Line  Text HOME to 397-294 - The Crisis Text Line serves anyone, in any type of crisis,  providing access to free, 24/7 support and information via the medium people already use and trust  http://www.crisistextline.org   Here's how it works:  Text HOME to 415-177 from anywhere in the [place], anytime, about any type of crisis.  A live, trained Crisis Counselor receives the text and responds quickly.  The volunteer Crisis Counselor will help you move from a 'hot moment to a cool moment'

## 2021-01-13 ENCOUNTER — OFFICE VISIT (OUTPATIENT)
Dept: FAMILY MEDICINE | Facility: OTHER | Age: 46
End: 2021-01-13
Attending: FAMILY MEDICINE
Payer: COMMERCIAL

## 2021-01-13 VITALS
HEIGHT: 67 IN | BODY MASS INDEX: 44.54 KG/M2 | OXYGEN SATURATION: 98 % | SYSTOLIC BLOOD PRESSURE: 128 MMHG | HEART RATE: 91 BPM | WEIGHT: 283.8 LBS | DIASTOLIC BLOOD PRESSURE: 86 MMHG | TEMPERATURE: 98.3 F

## 2021-01-13 DIAGNOSIS — I10 BENIGN ESSENTIAL HYPERTENSION: ICD-10-CM

## 2021-01-13 DIAGNOSIS — E55.9 VITAMIN D DEFICIENCY: ICD-10-CM

## 2021-01-13 DIAGNOSIS — E88.810 INSULIN RESISTANCE SYNDROME: ICD-10-CM

## 2021-01-13 DIAGNOSIS — Z00.00 ROUTINE GENERAL MEDICAL EXAMINATION AT A HEALTH CARE FACILITY: Primary | ICD-10-CM

## 2021-01-13 DIAGNOSIS — F33.9 EPISODE OF RECURRENT MAJOR DEPRESSIVE DISORDER, UNSPECIFIED DEPRESSION EPISODE SEVERITY (H): ICD-10-CM

## 2021-01-13 DIAGNOSIS — N92.0 MENORRHAGIA WITH REGULAR CYCLE: ICD-10-CM

## 2021-01-13 DIAGNOSIS — E66.01 MORBID OBESITY (H): ICD-10-CM

## 2021-01-13 DIAGNOSIS — F43.21 GRIEF: ICD-10-CM

## 2021-01-13 PROCEDURE — 99396 PREV VISIT EST AGE 40-64: CPT | Mod: 25 | Performed by: FAMILY MEDICINE

## 2021-01-13 PROCEDURE — 90471 IMMUNIZATION ADMIN: CPT | Performed by: FAMILY MEDICINE

## 2021-01-13 PROCEDURE — 90686 IIV4 VACC NO PRSV 0.5 ML IM: CPT | Performed by: FAMILY MEDICINE

## 2021-01-13 RX ORDER — MEDROXYPROGESTERONE ACETATE 10 MG
10 TABLET ORAL DAILY
Qty: 90 TABLET | Refills: 3 | Status: SHIPPED | OUTPATIENT
Start: 2021-01-13 | End: 2022-02-16

## 2021-01-13 RX ORDER — SERTRALINE HYDROCHLORIDE 100 MG/1
100 TABLET, FILM COATED ORAL DAILY
Qty: 90 TABLET | Refills: 3 | Status: SHIPPED | OUTPATIENT
Start: 2021-01-13 | End: 2021-09-09

## 2021-01-13 RX ORDER — QUINIDINE SULFATE 200 MG
800 TABLET ORAL
COMMUNITY
End: 2021-06-23

## 2021-01-13 RX ORDER — METFORMIN HCL 500 MG
TABLET, EXTENDED RELEASE 24 HR ORAL
Qty: 120 TABLET | Refills: 11 | Status: SHIPPED | OUTPATIENT
Start: 2021-01-13 | End: 2022-02-21

## 2021-01-13 RX ORDER — LOSARTAN POTASSIUM 25 MG/1
25 TABLET ORAL DAILY
Qty: 90 TABLET | Refills: 1 | Status: SHIPPED | OUTPATIENT
Start: 2021-01-13 | End: 2021-08-17

## 2021-01-13 RX ORDER — PHENOL 1.4 %
10 AEROSOL, SPRAY (ML) MUCOUS MEMBRANE AT BEDTIME
COMMUNITY
End: 2021-08-04

## 2021-01-13 ASSESSMENT — MIFFLIN-ST. JEOR: SCORE: 1962

## 2021-01-13 ASSESSMENT — PAIN SCALES - GENERAL: PAINLEVEL: NO PAIN (0)

## 2021-01-13 NOTE — NURSING NOTE
"Chief Complaint   Patient presents with     Physical       Initial /86 (BP Location: Right arm, Patient Position: Chair, Cuff Size: Adult Large)   Pulse 91   Temp 98.3  F (36.8  C) (Tympanic)   Ht 1.689 m (5' 6.5\")   Wt 128.7 kg (283 lb 12.8 oz)   SpO2 98%   BMI 45.12 kg/m   Estimated body mass index is 45.12 kg/m  as calculated from the following:    Height as of this encounter: 1.689 m (5' 6.5\").    Weight as of this encounter: 128.7 kg (283 lb 12.8 oz).  Medication Reconciliation: complete  Valeriy Macias LPN  "

## 2021-01-15 ENCOUNTER — MYC MEDICAL ADVICE (OUTPATIENT)
Dept: FAMILY MEDICINE | Facility: OTHER | Age: 46
End: 2021-01-15

## 2021-01-15 DIAGNOSIS — R74.8 ELEVATED LIVER ENZYMES: Primary | ICD-10-CM

## 2021-01-15 DIAGNOSIS — Z00.00 ROUTINE GENERAL MEDICAL EXAMINATION AT A HEALTH CARE FACILITY: ICD-10-CM

## 2021-01-15 DIAGNOSIS — E55.9 VITAMIN D DEFICIENCY: ICD-10-CM

## 2021-01-15 DIAGNOSIS — E88.810 INSULIN RESISTANCE SYNDROME: ICD-10-CM

## 2021-01-15 DIAGNOSIS — I10 BENIGN ESSENTIAL HYPERTENSION: ICD-10-CM

## 2021-01-15 DIAGNOSIS — E66.01 MORBID OBESITY (H): ICD-10-CM

## 2021-01-15 LAB
ALBUMIN SERPL-MCNC: 3.4 G/DL (ref 3.4–5)
ALP SERPL-CCNC: 60 U/L (ref 40–150)
ALT SERPL W P-5'-P-CCNC: 170 U/L (ref 0–50)
ANION GAP SERPL CALCULATED.3IONS-SCNC: 5 MMOL/L (ref 3–14)
AST SERPL W P-5'-P-CCNC: 51 U/L (ref 0–45)
BASOPHILS # BLD AUTO: 0.1 10E9/L (ref 0–0.2)
BASOPHILS NFR BLD AUTO: 1.3 %
BILIRUB SERPL-MCNC: 0.3 MG/DL (ref 0.2–1.3)
BUN SERPL-MCNC: 11 MG/DL (ref 7–30)
CALCIUM SERPL-MCNC: 9.1 MG/DL (ref 8.5–10.1)
CHLORIDE SERPL-SCNC: 110 MMOL/L (ref 94–109)
CHOLEST SERPL-MCNC: 162 MG/DL
CO2 SERPL-SCNC: 26 MMOL/L (ref 20–32)
CREAT SERPL-MCNC: 0.62 MG/DL (ref 0.52–1.04)
DIFFERENTIAL METHOD BLD: NORMAL
EOSINOPHIL # BLD AUTO: 0.5 10E9/L (ref 0–0.7)
EOSINOPHIL NFR BLD AUTO: 6.9 %
ERYTHROCYTE [DISTWIDTH] IN BLOOD BY AUTOMATED COUNT: 13.1 % (ref 10–15)
EST. AVERAGE GLUCOSE BLD GHB EST-MCNC: 103 MG/DL
GFR SERPL CREATININE-BSD FRML MDRD: >90 ML/MIN/{1.73_M2}
GLUCOSE SERPL-MCNC: 91 MG/DL (ref 70–99)
HBA1C MFR BLD: 5.2 % (ref 0–5.6)
HCT VFR BLD AUTO: 40.3 % (ref 35–47)
HDLC SERPL-MCNC: 36 MG/DL
HGB BLD-MCNC: 13.1 G/DL (ref 11.7–15.7)
IMM GRANULOCYTES # BLD: 0 10E9/L (ref 0–0.4)
IMM GRANULOCYTES NFR BLD: 0.3 %
LDLC SERPL CALC-MCNC: 100 MG/DL
LYMPHOCYTES # BLD AUTO: 2.8 10E9/L (ref 0.8–5.3)
LYMPHOCYTES NFR BLD AUTO: 38.1 %
MCH RBC QN AUTO: 28.7 PG (ref 26.5–33)
MCHC RBC AUTO-ENTMCNC: 32.5 G/DL (ref 31.5–36.5)
MCV RBC AUTO: 88 FL (ref 78–100)
MONOCYTES # BLD AUTO: 0.6 10E9/L (ref 0–1.3)
MONOCYTES NFR BLD AUTO: 8.6 %
NEUTROPHILS # BLD AUTO: 3.3 10E9/L (ref 1.6–8.3)
NEUTROPHILS NFR BLD AUTO: 44.8 %
NONHDLC SERPL-MCNC: 126 MG/DL
NRBC # BLD AUTO: 0 10*3/UL
NRBC BLD AUTO-RTO: 0 /100
PLATELET # BLD AUTO: 419 10E9/L (ref 150–450)
POTASSIUM SERPL-SCNC: 3.9 MMOL/L (ref 3.4–5.3)
PROT SERPL-MCNC: 7.2 G/DL (ref 6.8–8.8)
RBC # BLD AUTO: 4.57 10E12/L (ref 3.8–5.2)
SODIUM SERPL-SCNC: 141 MMOL/L (ref 133–144)
TRIGL SERPL-MCNC: 131 MG/DL
WBC # BLD AUTO: 7.4 10E9/L (ref 4–11)

## 2021-01-15 PROCEDURE — 80061 LIPID PANEL: CPT | Performed by: FAMILY MEDICINE

## 2021-01-15 PROCEDURE — 83036 HEMOGLOBIN GLYCOSYLATED A1C: CPT | Performed by: FAMILY MEDICINE

## 2021-01-15 PROCEDURE — 82306 VITAMIN D 25 HYDROXY: CPT | Performed by: FAMILY MEDICINE

## 2021-01-15 PROCEDURE — 80053 COMPREHEN METABOLIC PANEL: CPT | Performed by: FAMILY MEDICINE

## 2021-01-15 PROCEDURE — 85025 COMPLETE CBC W/AUTO DIFF WBC: CPT | Performed by: FAMILY MEDICINE

## 2021-01-15 PROCEDURE — 36415 COLL VENOUS BLD VENIPUNCTURE: CPT | Performed by: FAMILY MEDICINE

## 2021-01-18 LAB — DEPRECATED CALCIDIOL+CALCIFEROL SERPL-MC: 63 UG/L (ref 20–75)

## 2021-01-29 DIAGNOSIS — R74.8 ELEVATED LIVER ENZYMES: ICD-10-CM

## 2021-01-29 LAB
ALBUMIN SERPL-MCNC: 3.4 G/DL (ref 3.4–5)
ALP SERPL-CCNC: 57 U/L (ref 40–150)
ALT SERPL W P-5'-P-CCNC: 53 U/L (ref 0–50)
AST SERPL W P-5'-P-CCNC: 20 U/L (ref 0–45)
BILIRUB DIRECT SERPL-MCNC: <0.1 MG/DL (ref 0–0.2)
BILIRUB SERPL-MCNC: 0.3 MG/DL (ref 0.2–1.3)
PROT SERPL-MCNC: 7.4 G/DL (ref 6.8–8.8)

## 2021-01-29 PROCEDURE — 36415 COLL VENOUS BLD VENIPUNCTURE: CPT | Performed by: FAMILY MEDICINE

## 2021-01-29 PROCEDURE — 80076 HEPATIC FUNCTION PANEL: CPT | Performed by: FAMILY MEDICINE

## 2021-02-03 ENCOUNTER — OFFICE VISIT (OUTPATIENT)
Dept: PSYCHOLOGY | Facility: OTHER | Age: 46
End: 2021-02-03
Attending: COUNSELOR
Payer: COMMERCIAL

## 2021-02-03 ENCOUNTER — MYC MEDICAL ADVICE (OUTPATIENT)
Dept: FAMILY MEDICINE | Facility: OTHER | Age: 46
End: 2021-02-03

## 2021-02-03 DIAGNOSIS — F41.1 GAD (GENERALIZED ANXIETY DISORDER): Primary | ICD-10-CM

## 2021-02-03 DIAGNOSIS — F50.811 BINGE-EATING DISORDER, MODERATE: ICD-10-CM

## 2021-02-03 PROCEDURE — 90791 PSYCH DIAGNOSTIC EVALUATION: CPT | Performed by: COUNSELOR

## 2021-02-04 NOTE — TELEPHONE ENCOUNTER
Agree with Dr Cuenca.  MIralax can be used for constipation as well.  Would advise appointment for hip pain.

## 2021-02-08 NOTE — PROGRESS NOTES
"  Assessment & Plan     Bilateral hip pain  Most tender over bursa, but also some pain posterior and groin.  xrays obtained.  Referral to PT.  Can't tolerate NSAIDs recently with consitpation.  Oral steroid taper.  - XR Hip Left 2-3 Views; Future  - XR Hip Right 2-3 Views; Future  - PHYSICAL THERAPY REFERRAL; Future  - methylPREDNISolone (MEDROL DOSEPAK) 4 MG tablet therapy pack; Follow Package Directions    Trochanteric bursitis of both hips  As above.  - PHYSICAL THERAPY REFERRAL; Future  - methylPREDNISolone (MEDROL DOSEPAK) 4 MG tablet therapy pack; Follow Package Directions    Somatic dysfunction of spine affecting pelvic region  As above.  - PHYSICAL THERAPY REFERRAL; Future      See patient instructions.    Aaliyah Sethi MD  [facility] Hurd is a 45 year old who presents to clinic today for the following health issues     HPI       Musculoskeletal problem/pain  Onset/Duration: 1 month   Description  Location: hip  - bilateral; knees past few days as well  Joint Swelling: no  Redness: no  Pain: YES  Warmth: no  Intensity:  5 /10  Progression of Symptoms:  worsening  Accompanying signs and symptoms:   Fevers: no  Numbness/tingling/weakness: YES , left leg   History  Trauma to the area: no  Recent illness:  no  Previous similar problem: no  Previous evaluation:  no  Precipitating or alleviating factors:  Aggravating factors include: standing and climbing stairs  Therapies tried and outcome: rest/inactivity and Ibuprofen three  times a day but it caused constipation . Heating pads   Feels like they \"pop out\", give way, no audible sounds  No swelling, bruising, rash.  Tender to touch bilateral laterally.  No ice.  Used heating pad.        Review of Systems   Constitutional, HEENT, cardiovascular, pulmonary, gi and gu systems are negative, except as otherwise noted.      Objective    /82 (BP Location: Right arm, Patient Position: Chair, Cuff Size: Adult Large)   " "Pulse 86   Temp 97.9  F (36.6  C) (Tympanic)   Ht 1.664 m (5' 5.5\")   Wt 126.7 kg (279 lb 6.4 oz)   LMP 01/08/2021   SpO2 100%   BMI 45.79 kg/m    Body mass index is 45.79 kg/m .  Physical Exam   GENERAL: alert, no distress and obese  MS: normal muscle tone, peripheral pulses normal and tenderness to palpation bilateral greater trochanter, bilateral SI joints; negative SLR bilaterally; figure 4 uncomfortable bilaterally; symmetric reflexes; fair flexibility with forward fold; pain posterior with external hip rotation; pain in groin with internal rotation - also bilaterally  SKIN: no suspicious lesions or rashes  NEURO: Normal strength and tone, mentation intact and speech normal  PSYCH: mentation appears normal, affect normal/bright    Xray - Reviewed  - read pending.            "

## 2021-02-11 ENCOUNTER — ANCILLARY PROCEDURE (OUTPATIENT)
Dept: GENERAL RADIOLOGY | Facility: OTHER | Age: 46
End: 2021-02-11
Attending: FAMILY MEDICINE
Payer: COMMERCIAL

## 2021-02-11 ENCOUNTER — OFFICE VISIT (OUTPATIENT)
Dept: FAMILY MEDICINE | Facility: OTHER | Age: 46
End: 2021-02-11
Attending: FAMILY MEDICINE
Payer: COMMERCIAL

## 2021-02-11 ENCOUNTER — MYC MEDICAL ADVICE (OUTPATIENT)
Dept: FAMILY MEDICINE | Facility: OTHER | Age: 46
End: 2021-02-11

## 2021-02-11 ENCOUNTER — OFFICE VISIT (OUTPATIENT)
Dept: PSYCHOLOGY | Facility: OTHER | Age: 46
End: 2021-02-11
Attending: COUNSELOR
Payer: COMMERCIAL

## 2021-02-11 VITALS
HEART RATE: 86 BPM | WEIGHT: 279.4 LBS | SYSTOLIC BLOOD PRESSURE: 110 MMHG | OXYGEN SATURATION: 100 % | BODY MASS INDEX: 44.9 KG/M2 | TEMPERATURE: 97.9 F | DIASTOLIC BLOOD PRESSURE: 82 MMHG | HEIGHT: 66 IN

## 2021-02-11 DIAGNOSIS — M25.551 BILATERAL HIP PAIN: ICD-10-CM

## 2021-02-11 DIAGNOSIS — F50.811 BINGE-EATING DISORDER, MODERATE: ICD-10-CM

## 2021-02-11 DIAGNOSIS — M70.62 TROCHANTERIC BURSITIS OF BOTH HIPS: ICD-10-CM

## 2021-02-11 DIAGNOSIS — M99.05 SOMATIC DYSFUNCTION OF SPINE AFFECTING PELVIC REGION: ICD-10-CM

## 2021-02-11 DIAGNOSIS — M70.61 TROCHANTERIC BURSITIS OF BOTH HIPS: ICD-10-CM

## 2021-02-11 DIAGNOSIS — M25.551 BILATERAL HIP PAIN: Primary | ICD-10-CM

## 2021-02-11 DIAGNOSIS — M25.552 BILATERAL HIP PAIN: ICD-10-CM

## 2021-02-11 DIAGNOSIS — F41.1 GAD (GENERALIZED ANXIETY DISORDER): Primary | ICD-10-CM

## 2021-02-11 DIAGNOSIS — M25.552 BILATERAL HIP PAIN: Primary | ICD-10-CM

## 2021-02-11 PROCEDURE — 90837 PSYTX W PT 60 MINUTES: CPT | Performed by: COUNSELOR

## 2021-02-11 PROCEDURE — 73502 X-RAY EXAM HIP UNI 2-3 VIEWS: CPT | Mod: TC | Performed by: RADIOLOGY

## 2021-02-11 PROCEDURE — 99213 OFFICE O/P EST LOW 20 MIN: CPT | Performed by: FAMILY MEDICINE

## 2021-02-11 RX ORDER — METHYLPREDNISOLONE 4 MG
TABLET, DOSE PACK ORAL
Qty: 21 TABLET | Refills: 0 | Status: SHIPPED | OUTPATIENT
Start: 2021-02-11 | End: 2021-03-25

## 2021-02-11 ASSESSMENT — PAIN SCALES - GENERAL: PAINLEVEL: MODERATE PAIN (5)

## 2021-02-11 ASSESSMENT — ANXIETY QUESTIONNAIRES
4. TROUBLE RELAXING: NOT AT ALL
1. FEELING NERVOUS, ANXIOUS, OR ON EDGE: NOT AT ALL
5. BEING SO RESTLESS THAT IT IS HARD TO SIT STILL: NOT AT ALL
6. BECOMING EASILY ANNOYED OR IRRITABLE: SEVERAL DAYS
3. WORRYING TOO MUCH ABOUT DIFFERENT THINGS: SEVERAL DAYS
3. WORRYING TOO MUCH ABOUT DIFFERENT THINGS: SEVERAL DAYS
7. FEELING AFRAID AS IF SOMETHING AWFUL MIGHT HAPPEN: SEVERAL DAYS
2. NOT BEING ABLE TO STOP OR CONTROL WORRYING: NOT AT ALL
5. BEING SO RESTLESS THAT IT IS HARD TO SIT STILL: NOT AT ALL
IF YOU CHECKED OFF ANY PROBLEMS ON THIS QUESTIONNAIRE, HOW DIFFICULT HAVE THESE PROBLEMS MADE IT FOR YOU TO DO YOUR WORK, TAKE CARE OF THINGS AT HOME, OR GET ALONG WITH OTHER PEOPLE: SOMEWHAT DIFFICULT
IF YOU CHECKED OFF ANY PROBLEMS ON THIS QUESTIONNAIRE, HOW DIFFICULT HAVE THESE PROBLEMS MADE IT FOR YOU TO DO YOUR WORK, TAKE CARE OF THINGS AT HOME, OR GET ALONG WITH OTHER PEOPLE: NOT DIFFICULT AT ALL
GAD7 TOTAL SCORE: 2
GAD7 TOTAL SCORE: 3
7. FEELING AFRAID AS IF SOMETHING AWFUL MIGHT HAPPEN: NOT AT ALL
2. NOT BEING ABLE TO STOP OR CONTROL WORRYING: NOT AT ALL
6. BECOMING EASILY ANNOYED OR IRRITABLE: SEVERAL DAYS
1. FEELING NERVOUS, ANXIOUS, OR ON EDGE: NOT AT ALL

## 2021-02-11 ASSESSMENT — PATIENT HEALTH QUESTIONNAIRE - PHQ9
SUM OF ALL RESPONSES TO PHQ QUESTIONS 1-9: 7
5. POOR APPETITE OR OVEREATING: NOT AT ALL
SUM OF ALL RESPONSES TO PHQ QUESTIONS 1-9: 7

## 2021-02-11 ASSESSMENT — MIFFLIN-ST. JEOR: SCORE: 1926.16

## 2021-02-11 NOTE — PATIENT INSTRUCTIONS
Will call with xray results.  Referral to physical therapy.  Complete steroid taper.  Ice rather than heat.  Consider injections - cortisone.      Patient Education     Understanding Trochanteric Bursitis    A bursa is a thin, slippery, sac-like film. It contains a small amount of fluid. This structure is found between bones and soft tissues in and around joints. A bursa cushions and protects a joint. It keeps parts of a joint from rubbing against each other. If a bursa becomes inflamed and irritated, it's known as bursitis.   The trochanteric bursa is found on the hip joint. It lies on top of the bump at the top of the thighbone called the greater trochanter. Inflammation of this bursa is called trochanteric bursitis.   How to say it   [id number]  Causes of trochanteric bursitis  Causes may include:    Overuse of the hip during running or other sports, dance, or work    Falling on or irritation to the side of the hip  This condition may occur along with other problems, such as osteoarthritis of the hip or knee, or low back problems. In rare cases, it may occur after hip surgery.   Symptoms of trochanteric bursitis    Pain or aching on the side of the hip. It's often felt as a sharp, intense pain. The pain may travel down the leg.    Swelling, tenderness, or warmth on the side of the hip at the bony bump at the top of the thigh    Treatment for trochanteric bursitis  These may include:    Resting the hip. This allows the bursa to heal.    Prescription or over-the-counter pain medicines. These help reduce inflammation, swelling, and pain. NSAIDs (nonsteroidal anti-inflammatory drugs) are the most common medicines used. Medicines may be prescribed or bought over the counter. They may be given as pills. Or they may be put on the skin as a gel, cream, or patch.    Cold packs and heat packs. These help reduce pain and swelling.    Stretching and strengthening exercises. These improve flexibility and strength around  the hip.    Physical therapy. This includes exercises or other treatments.    Injections of medicine into the bursa.  This may help reduce inflammation and relieve symptoms. The medicine is usually a corticosteroid. This is a strong anti-inflammatory medicine.  Possible complications  If you don t give your hip time to heal, the problem may not go away, may return, or may get worse. Rest and treat your hip as directed.   When to call your healthcare provider  Call your healthcare provider right away if you have any of these:    Fever of 100.4 F (38 C) or higher, or as directed by your provider    Redness, swelling, or warmth that gets worse    Symptoms that don t get better with prescribed medicines, or get worse    New symptoms  Adri last reviewed this educational content on 6/1/2019 2000-2020 [employer]. [address] [place] [address], [place] [address]. All rights reserved. This information is not intended as a substitute for professional medical care. Always follow your healthcare professional's instructions.

## 2021-02-11 NOTE — PROGRESS NOTES
The author of this note documented a reason for not sharing it with the patient.    Counseling Progress Note    Client Name: Vannessa Molina    Date of Service (when I saw the patient): 02/11/2021    Service Type: Individual Psychotherapy    Session Start Time:  1:00pm  Session End Time: 2:00  Session Length: I spent 53+ minutes performing psychotherapy during this office visit.  Session Number: 2    DSM5 Diagnoses: 300.02 (F41.1) Generalized Anxiety Disorder; 307.51 (F50.8) Binge-Eating Disorder, Severity: Moderate    Attendees: Client     Health Maintenance Topics with due status: Due Soon       Topic Date Due    NIRAJ ASSESSMENT 02/17/2021       Treatment Plan Due Date: 05/12/2021  Diagnostic Assessment Due Date: 2/2/2022    DATA    Treatment Objective(s) Addressed in This Session: Building therapeutic rapport, discussed / developed treatment plan goals and objectives    Progress on / Status of Treatment Objective(s) / Homework: Vannessa came with clear ideas of what she wants to get out of therapy. She appears motivated to make changes.    Intervention: Active listening, open questions, reflections, clarifications. Lead discussion of treatment plan goals and desired changes.     Current Stressors / Issues: Not having her drivers license, current physical health issues - just diagnosed with bursitis and arthritis in both hips.     Medication Review: Yes - reviewed, several new medications for new diagnosis of bursitis and arthritis    Medication Compliance: Takes medications as prescribed    Changes in Health: New diagnoses - Vannessa has had hip pain for quite some time and is not working with her doctor in hopes to reduce/resolve pain    Chemical Use Review:   Substance Use: No    Tobacco Use: No    ASSESSMENT: Current Emotional / Mental Status (status of significant symptoms):  Risk status no evidence of suicidal or homicidal ideation  Client denies current fears or concerns for personal safety.  A safety and risk  management plan has not been developed at this time; however client was given the after-hours number should there be a change in any of these risk factors.    Appearance:                            Appropriate   Eye Contact:                           Good   Psychomotor Behavior:          Normal   Attitude:                                   Cooperative  Friendly Pleasant  Orientation:                             All  Speech              Rate / Production:       Normal/ Responsive Normal               Volume:                       Normal   Mood:                                      Normal  Affect:                                      Appropriate   Thought Content:                    Clear   Thought Form:                        Coherent  Logical   Insight:                                     Good     Collateral Reports Completed: PHQ9 & GAD7      PLAN: Weekly therapy sessions. Continue to build rapport. Begin work on treatment plan goals and objectives.       Pattie Bucio LPCC

## 2021-02-11 NOTE — NURSING NOTE
"Chief Complaint   Patient presents with     Musculoskeletal Problem     right hip pain x 1 month        Initial /82 (BP Location: Right arm, Patient Position: Chair, Cuff Size: Adult Large)   Pulse 86   Temp 97.9  F (36.6  C) (Tympanic)   Ht 1.664 m (5' 5.5\")   Wt 126.7 kg (279 lb 6.4 oz)   LMP 01/08/2021   SpO2 100%   BMI 45.79 kg/m   Estimated body mass index is 45.79 kg/m  as calculated from the following:    Height as of this encounter: 1.664 m (5' 5.5\").    Weight as of this encounter: 126.7 kg (279 lb 6.4 oz).  Medication Reconciliation: complete  Gifty Marie LPN  "

## 2021-02-12 ASSESSMENT — ANXIETY QUESTIONNAIRES
GAD7 TOTAL SCORE: 2
GAD7 TOTAL SCORE: 3

## 2021-02-12 NOTE — TELEPHONE ENCOUNTER
Believe it should be deferred for 2 weeks, for max effectiveness,  but can also check with pharmacy.

## 2021-02-16 ASSESSMENT — ANXIETY QUESTIONNAIRES
7. FEELING AFRAID AS IF SOMETHING AWFUL MIGHT HAPPEN: SEVERAL DAYS
2. NOT BEING ABLE TO STOP OR CONTROL WORRYING: MORE THAN HALF THE DAYS
6. BECOMING EASILY ANNOYED OR IRRITABLE: SEVERAL DAYS
1. FEELING NERVOUS, ANXIOUS, OR ON EDGE: MORE THAN HALF THE DAYS
IF YOU CHECKED OFF ANY PROBLEMS ON THIS QUESTIONNAIRE, HOW DIFFICULT HAVE THESE PROBLEMS MADE IT FOR YOU TO DO YOUR WORK, TAKE CARE OF THINGS AT HOME, OR GET ALONG WITH OTHER PEOPLE: SOMEWHAT DIFFICULT
3. WORRYING TOO MUCH ABOUT DIFFERENT THINGS: MORE THAN HALF THE DAYS
5. BEING SO RESTLESS THAT IT IS HARD TO SIT STILL: NOT AT ALL
GAD7 TOTAL SCORE: 10

## 2021-02-16 ASSESSMENT — PATIENT HEALTH QUESTIONNAIRE - PHQ9
5. POOR APPETITE OR OVEREATING: MORE THAN HALF THE DAYS
SUM OF ALL RESPONSES TO PHQ QUESTIONS 1-9: 9
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
## 2021-02-17 ASSESSMENT — ANXIETY QUESTIONNAIRES: GAD7 TOTAL SCORE: 10

## 2021-02-23 ENCOUNTER — HOSPITAL ENCOUNTER (OUTPATIENT)
Dept: PHYSICAL THERAPY | Facility: HOSPITAL | Age: 46
Setting detail: THERAPIES SERIES
End: 2021-02-23
Attending: FAMILY MEDICINE
Payer: COMMERCIAL

## 2021-02-23 PROCEDURE — 97110 THERAPEUTIC EXERCISES: CPT | Mod: GP | Performed by: PHYSICAL THERAPIST

## 2021-02-23 PROCEDURE — 97161 PT EVAL LOW COMPLEX 20 MIN: CPT | Mod: GP | Performed by: PHYSICAL THERAPIST

## 2021-02-23 ASSESSMENT — ACTIVITIES OF DAILY LIVING (ADL)
WALKING_INITIALLY: SLIGHT DIFFICULTY
RECREATIONAL_ACTIVITIES: EXTREME DIFFICULTY
LIGHT_TO_MODERATE_WORK: SLIGHT DIFFICULTY
DEEP_SQUATTING: UNABLE TO DO
GOING_DOWN_1_FLIGHT_OF_STAIRS: EXTREME DIFFICULTY
WALKING_DOWN_STEEP_HILLS: EXTREME DIFFICULTY
WALKING_15_MINUTES_OR_GREATER: UNABLE TO DO
WALKING_UP_STEEP_HILLS: EXTREME DIFFICULTY
HOW_WOULD_YOU_RATE_YOUR_CURRENT_LEVEL_OF_FUNCTION_DURING_YOUR_USUAL_ACTIVITIES_OF_DAILY_LIVING_FROM_0_TO_100_WITH_100_BEING_YOUR_LEVEL_OF_FUNCTION_PRIOR_TO_YOUR_HIP_PROBLEM_AND_0_BEING_THE_INABILITY_TO_PERFORM_ANY_OF_YOUR_USUAL_DAILY_ACTIVITIES?: 0
GETTING_INTO_AND_OUT_OF_AN_AVERAGE_CAR: EXTREME DIFFICULTY
HOS_ADL_COUNT: 17
STEPPING_UP_AND_DOWN_CURBS: EXTREME DIFFICULTY
HOS_ADL_ITEM_SCORE_TOTAL: 20
HEAVY_WORK: EXTREME DIFFICULTY
WALKING_APPROXIMATELY_10_MINUTES: EXTREME DIFFICULTY
TWISTING/PIVOTING_ON_INVOLVED_LEG: EXTREME DIFFICULTY
STANDING_FOR_15_MINUTES: MODERATE DIFFICULTY
SITTING_FOR_15_MINUTES: MODERATE DIFFICULTY
HOS_ADL_SCORE(%): 29.41
ROLLING_OVER_IN_BED: EXTREME DIFFICULTY
PUTTING_ON_SOCKS_AND_SHOES: EXTREME DIFFICULTY
HOS_ADL_HIGHEST_POTENTIAL_SCORE: 68
GOING_UP_1_FLIGHT_OF_STAIRS: EXTREME DIFFICULTY
GETTING_INTO_AND_OUT_OF_A_BATHTUB: EXTREME DIFFICULTY

## 2021-02-23 NOTE — PROGRESS NOTES
Initial Physical Therapy Evaluation      Name: Vannessa Molina MRN# [id number]   Age: 45 year old YOB: 1975     Date of Consultation: February 23, 2021  Primary care provider: Aaliyah Suarez    Referring Physician: Dr. Erick MD  Orders: Eval and Treat  Medical Diagnosis: Bilateral hip pain, Trochanteric bursitis of both hips, Somatic dysfunction of spine affecting pelvic region  Onset of Illness/Injury: 3 months prior, insidious onset    Reason for PT Visit: Patient is a 44 y/o female who presents with bilateral hip pain complaints. Her R hip is hurting less than her L today, but her hip pain fluctuates. She reports hip pain that began approximately 3 months ago, she began treating this hip pain with NSAIDs, but this caused her to become constipated so she stopped taking NSAIDs. She has received a steroid taper that helped her hip pain, pain has increased after she finished the steroids. 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 She has been taking tylenol which she doesn't think is helping, she has also iced which she found to not be beneficial. She reports difficulty with climbing stairs and getting in and out of cars. She does get some relief with stretching. Her hip pain has also been waking her 1-2 times a night and she is able to reposition and fall back to sleep. She reports that she feels better in the morning and her hips begin to hurt as she begins to move more throughout the day.   Prior Level of Function: Patient was previously unrestricted in mobility without hip pain   Pain: 3/10  Aching at rest, can be sharp with active motion and can reach a 10/10.    Pain with coughing: no  Pain with sneezing: no  Pain with straining: no  Change in bowel/bladder: no  Numbness or tingling in groin area: no      Community Support/Living Environment/Employment History:  Patient lives in a 3 story home with her fiance and two children, she has 17 steps to the upper level and 14 steps to the basement with a unilateral railing on both stairs. She also has 4 steps to enter her home from the front and 2 steps down to her backyard.    Patient/Family Goal: She would like to be able to ambulate stairs pain-free again    Fall Screen:   Have you fallen 2 or more times in the last year? No  Have you fallen and had an injury in the last year? No  Is patient a fall risk? No    Past Medical History:   Past Medical History:   Diagnosis Date     Benign essential hypertension 2015     GERD (gastroesophageal reflux diseae) 2011     Major depressive affective disorder, recurrent episode, unspecified 2011     Obesity  2011     Vitamin D deficiency 2015       Past Surgical History:  Past Surgical History:   Procedure Laterality Date     bilateral tubal ligation        section  ,         oligohydramnios       Tonsillectomy         Medications:   Current Outpatient Medications   Medication Sig     cholecalciferol (VITAMIN D3) 5000 units (125 mcg) capsule Take 5,000 Units by mouth 2 times daily     Docusate Sodium (GENTLE STOOL SOFTENER PO) Take 100 mg by mouth daily      ferrous sulfate (FEROSUL) 325 (65 Fe) MG tablet Take 325 mg by mouth daily (with breakfast)     Flaxseed, Linseed, (FLAX SEED OIL PO) Take 3 tablets by mouth daily      Garcinia Cambogia-Chromium 500-200 MG-MCG TABS Take 800 mg by mouth     losartan (COZAAR) 25 MG tablet Take 1 tablet (25 mg) by mouth daily     LYSINE PO Take half a tab daily     medroxyPROGESTERone (PROVERA) 10 MG tablet Take 1 tablet (10 mg) by mouth daily     Melatonin 10 MG TABS tablet Take 10 mg by mouth At Bedtime     metFORMIN (GLUCOPHAGE-XR) 500 MG 24 hr tablet TAKE 2 TABLETS BY MOUTH 2 TIMES A DAY WITH MEALS. SWALLOW TABLETS WHOLE, DO NOT CRUSH, DIVIDE OR CHEW     methylPREDNISolone (MEDROL DOSEPAK) 4 MG tablet  "therapy pack Follow Package Directions     sertraline (ZOLOFT) 100 MG tablet Take 1 tablet (100 mg) by mouth daily     No current facility-administered medications for this encounter.        Imaging: X-ray of L hip on 2/11/2021 - \"FINDINGS:  There is joint space narrowing at the left hip with mild  degenerative osteophytes. Subchondral cystic changes seen in the  acetabulum.                                                                      IMPRESSION: Degenerative changes of the left hip       BRIAN DRUMMOND MD\"  X-ray of R hip on 2/11/2021 - \"FINDINGS:  Mild joint space narrowing at the right hip. The acetabulum  and proximal femur appear intact.                                                                       IMPRESSION: Mild joint space narrowing at the right hip       BRIAN DRUMMOND MD\"    Musculoskeletal Findings:     OBJECTIVE     Observation:   Palpation: Patient tender to palpation over the right greater trochanter, patient also tender to palpation of the piriformis bilaterally. L4 and L5 painful with central PA pressures.    Gait: Normal, decreased gait speed, antalgic gait   Assistive devices: no assistive devices    Functional mobility:  Posture: Slightly stooped posture.  Sitting Posture: slightly stooped posture  Standing Posture: slightly stooped posture    Neurological Assessment:  Reflexes - Right:  Patellar: 2+  Achilles: 1+    Reflexes - Left:  Patellar: 2+  Achilles: 1+    Myotomes:  Right lower extremity: intact  Left lower extremity: intact    Range of Motion:  Active Lumbar Spine:       Flexion: Patient able to place hands on floor, has pain with rising from flexion       Extension: Moderately limited and painful       Right sideglide: Moderately limited and painful       Left sideglide: Moderately limited and painful    Strength:   Right Lower Extremity Strength: 5/5 except hip flexion 4-/5, hip abduction 4-/5, knee extension 4+/5  Left Lower Extremity Strength: 5/5 except hip " abduction 4/5    Special Tests:    Spine Special Tests:  Prone Knee Bend:     Left:  negative     Right: negative  Repeated Movement Testing:   Repeated extension in lying increased lumbosacral flexion, extension, and sideglides bilaterally, eliminated pain with left sidegliding, decreased pain with extension and rising from flexion     Hip Special Tests        TIFF:   Negative on the left, positive on the right                                FADIR: negative bilaterally                                          Trendelenburg s Sign:   Not present with single leg stance on either LE   Ely's Test:     Negative bilaterally               Hip ROM:  L LE:  Knee ROM WNL  Hip flexion - 110 degrees  Hip extension - 15 degrees  Hip IR - 25 degrees  Hip ER - 45 degrees    R LE:   Knee ROM WNL  Hip flexion - 95 degrees  Hip extension - 10 degrees  Hip IR - 15 degrees  Hip ER - 30 degrees    Outcome Measures:   Hip Outcome Score - ADL Score - 29.41%    Prognosis/Plan of Care: Fair, secondary to patient's self-assessment of abilities on the Hip Outcome Score.  Appropriate for Physical Therapy Intervention: Yes        GOALS:   Short-term goals:  To be achieved in 2-4 weeks:    Instruct in home program  1.) Patient will understand biomechanical stressors of the hip joint in order to make modifications to activities to reduce further risk of injury.  2.) Patient will be independent with a short-term home exercise program.  3.) Patient will be able to actively flex her R hip throughout its full range of motion without pain to allow for increased comfort with getting into bed and into cars.    Long-term goals:  To be achieved in 8-10 weeks:    Return to previous level of function  Self management of symptoms.  Pain free activities of daily living.  1.) Patient will increase hip abduction and extension strength to 4+/5 bilaterally  2.) Patient will be able to negotiate a flight of stairs with reciprocal pattern independently.  3.)  "Patient will report 75% improvement in overall symptoms  4.) Patient will increase her self-reported score of \"Going up 1 flight of stairs\" and \"Going down 1 flight of stairs\" to \"Slight difficult\" or \"No difficulty at all\" to show return to PLOF with ambulating stairs.    Planned Interventions: Therapeutic exercise, manual therapy, therapeutic activity, patient education      Treatment Rendered/Intervention:  Evaluation completed as described above followed by discussion of exam findings and plan of care.    Therapeutic exercise: Patient instructed in and demonstrated proper performance of home exercise program consisting of:  Prone press-ups with sag, supine bridging, sidelying clamshell, standing hip rotation AAROM on a stool      Clinical Impressions:  Criteria for Skilled Therapeutic Intervention Met: Yes  PT Diagnosis: Low back pain without sciatica, Right hip pain with mobility deficits  Influenced by the following impairments: Low back pain with lumbosacral active range of motion, right hip pain with active range of motion  Functional limitations due to impairment: Patient feels unsteady stairs, has difficulty transferring in and out of vehicles and getting in and out of bed due to right hip pain  Clinical presentation: Evolving/Changing  Clinical presentation rationale: Patient's hip pain symptoms have varied in intensity from side to side over the 3 months that the patient has had hip pain. 3 elements were addressed in this evaluation and there are several comorbidities affecting this patient's condition.  Clinical Decision making (complexity): Moderate Complexity  Predicted Duration of Therapy Intervention (days/wks): 1x/week for up to 10 weeks  Risks and Benefits of therapy have been explained: Yes  Patient, Family & other staff in agreement with plan of care: Yes  Comments: Patient presents today with signs and symptoms consistent of Low back pain without sciatica and Right hip pain with mobility " deficits. She presents with bilateral hip pain with the R hip being worse than the L. Her left hip pain abolished with repeated extension exercises, and her lumbosacral range of motion increased. She has mobility deficits in her R hip compared to her L particularly with flexion and IR/ER ROM. Therapy today consisted of evaluation, patient education, and therapeutic exercise. Patient would benefit from continued PT sessions addressing overall pain and dysfunction with use of appropriate interventions. Treatment will consist of therapeutic exercise targeting hip strength and hip range of motion, patient's response to treatment will be monitored and treatment will be adjusted accordingly.      Total Evaluation Time: 35 minutes

## 2021-03-04 ENCOUNTER — VIRTUAL VISIT (OUTPATIENT)
Dept: PSYCHOLOGY | Facility: OTHER | Age: 46
End: 2021-03-04
Attending: COUNSELOR
Payer: COMMERCIAL

## 2021-03-04 DIAGNOSIS — F50.811 BINGE-EATING DISORDER, MODERATE: ICD-10-CM

## 2021-03-04 DIAGNOSIS — F41.1 GAD (GENERALIZED ANXIETY DISORDER): Primary | ICD-10-CM

## 2021-03-04 PROCEDURE — 90837 PSYTX W PT 60 MINUTES: CPT | Performed by: COUNSELOR

## 2021-03-04 NOTE — PROGRESS NOTES
"The author of this note documented a reason for not sharing it with the patient.    Counseling Progress Note    Client Name: Vannessa Molina    Date of Service (when I saw the patient): 03/04/2021    Service Type: Individual Psychotherapy    Session Start Time:  3:00pm  Session End Time: 3:57  Session Length: I spent 53+ minutes performing psychotherapy during this office visit.  Session Number: 3    DSM5 Diagnoses: 300.02 (F41.1) Generalized Anxiety Disorder; 307.51 (F50.8) Binge-Eating Disorder, Severity: Moderate    Attendees: Client     Health Maintenance Topics with due status: Due Soon       Topic Date Due    NIRAJ ASSESSMENT 02/17/2021       Treatment Plan Due Date: 05/12/2021  Diagnostic Assessment Due Date: 2/2/2022    The patient has been notified of following:     \"This video visit will be conducted via video between you and your physician/provider. We have found that certain health care needs can be provided without the need for a physical exam.  This service lets us provide the care you need through video.  If a prescription is necessary we can send it directly to your pharmacy.  If lab work is needed we can place an order for that and you can then stop by our lab to have the test done at a later time.    If during the course of the session the physician/provider feels a video visit is not appropriate, you will not be charged for this service.\"     Patient has given verbal consent for video visit?  Yes    DATA    Treatment Objective(s) Addressed in This Session: Building therapeutic rapport    Progress on / Status of Treatment Objective(s) / Homework: Vannessa reports having a bit of a breakdown. She is working 60+ hours a week at work and balancing arguments with her mom and sister. Vannessa has set goals for herself and when she does not follow-through exactly as set she tends to give up.    Intervention: Active listening, open questions, reflections, clarifications. Identified patterns of self-sabotage and all or " nothing thinking. Vannessa agreed to write a list or inventory of all that she has done well or accomplished in a day and then write down what she wants to set out to accomplish the next day.     Current Stressors / Issues: Not having her drivers license, current physical health issues - just diagnosed with bursitis and arthritis in both hips.     Medication Review: Yes - reviewed, several new medications for new diagnosis of bursitis and arthritis    Medication Compliance: Takes medications as prescribed    Changes in Health: New diagnoses - Vannessa has had hip pain for quite some time and is not working with her doctor in hopes to reduce/resolve pain    Chemical Use Review:   Substance Use: No    Tobacco Use: No    ASSESSMENT: Current Emotional / Mental Status (status of significant symptoms):  Risk status no evidence of suicidal or homicidal ideation  Client denies current fears or concerns for personal safety.  A safety and risk management plan has not been developed at this time; however client was given the after-hours number should there be a change in any of these risk factors.    Appearance:                           Appropriate  Eye Contact:                           Fair  Psychomotor Behavior:          Fidgety  Attitude:                                   Cooperative  Friendly Pleasant  Orientation:                             All  Speech              Rate / Production:       Normal/ Responsive Normal               Volume:                       Normal   Mood:                                      Normal  Affect:                                      Appropriate   Thought Content:                    Clear   Thought Form:                        Coherent  Logical   Insight:                                     Good     Collateral Reports Completed: None    PLAN: Weekly therapy sessions via video or in person. Continue to build rapport. Begin work on treatment plan goals and objectives. Check-in on her plan to write  down/take inventory of what she has done well or accomplished in a day and then what she plans to accomplish tomorrow.       Pattie Bucio, [facility]

## 2021-03-10 ENCOUNTER — HOSPITAL ENCOUNTER (OUTPATIENT)
Dept: PHYSICAL THERAPY | Facility: HOSPITAL | Age: 46
Setting detail: THERAPIES SERIES
End: 2021-03-10
Attending: FAMILY MEDICINE
Payer: COMMERCIAL

## 2021-03-10 PROCEDURE — 97110 THERAPEUTIC EXERCISES: CPT | Mod: GP

## 2021-03-10 PROCEDURE — 97140 MANUAL THERAPY 1/> REGIONS: CPT | Mod: GP

## 2021-03-15 ENCOUNTER — MYC MEDICAL ADVICE (OUTPATIENT)
Dept: FAMILY MEDICINE | Facility: OTHER | Age: 46
End: 2021-03-15

## 2021-03-15 NOTE — TELEPHONE ENCOUNTER
Therapy is for back and hips.  If having new joint pain - elbow, etc - should be re-evaluated.  With multiple joints involved - could consider lab testing - inflammatory markers, rheumatoid markers, etc.

## 2021-03-16 ENCOUNTER — TELEPHONE (OUTPATIENT)
Dept: FAMILY MEDICINE | Facility: OTHER | Age: 46
End: 2021-03-16

## 2021-03-16 ENCOUNTER — HOSPITAL ENCOUNTER (OUTPATIENT)
Dept: PHYSICAL THERAPY | Facility: HOSPITAL | Age: 46
Setting detail: THERAPIES SERIES
End: 2021-03-16
Attending: FAMILY MEDICINE
Payer: COMMERCIAL

## 2021-03-16 PROCEDURE — 97110 THERAPEUTIC EXERCISES: CPT | Mod: GP

## 2021-03-16 NOTE — TELEPHONE ENCOUNTER
Edgar Suarez - just wanted to give you a quick update on Vannessa. She is still having quite a bit of lateral hip pain that radiates down her thigh and into her groin. I'm wondering if she would benefit from a cortisone injection in her lateral hip area to help alleviate some of her tenderness and pain which could be stemming from her irritated bursal tissue. Her symptoms do seem to be presenting as trochanteric bursitis. She is having trouble walking/performing stairs and laying on that side secondary to the pain. Let me know what you think - thanks!

## 2021-03-17 NOTE — TELEPHONE ENCOUNTER
Sounds like she is having other joint pains as well, elbows, etc.  Will have her come in for some labs.  Can do bursa injection at that time if she is willing.   Thanks!

## 2021-03-17 NOTE — TELEPHONE ENCOUNTER
Should schedule appointment.  Her physical therapist suggested she consider a hip injection - for bursitis.  Could see her for injection and obtain other labs.  Next available is fine.

## 2021-03-18 NOTE — PROGRESS NOTES
Assessment & Plan     Arthralgia, unspecified joint  Somewhat diffuse, migratory.  Will check labs for inflammation, lyme, RA.  - CRP, inflammation  - ESR: Erythrocyte sedimentation rate  - Rheumatoid factor  - Cyclic Citrullinated Peptide Antibody IgG  - TSH with free T4 reflex  - Lyme Disease Griselda with reflex to WB Serum    Trochanteric bursitis of right hip  Bursa injection done today.  See procedure note.   If relief - can do other hip if needed.    - Large Joint/Bursa injection and/or drainage (Shoulder, Knee)  - triamcinolone (KENALOG-40) injection 40 mg  - lidocaine 1 % injection 5 mL  - lidocaine 1 % 5 mL    Medial epicondylitis of elbow, left  Discussed conservative measures - ice, OTC pain medications.  Educational handout givne.         See Patient Instructions        Aaliyah Sethi MD  [facility] - KINA Hurd is a 45 year old who presents for the following health issues     HPI     Musculoskeletal problem/pain - diffuse  Onset/Duration: Hip 4 months/ elbow 3 weeks  Description  Location: elbow - left/ hip-right (hip is the worst)  Joint Swelling: YES- elow  Redness: no  Pain: YES-both  Warmth: no  Intensity:  5/10  Progression of Symptoms:  same  Accompanying signs and symptoms:   Fevers: no  Numbness/tingling/weakness: YES- in hip  History  Trauma to the area: no  Recent illness:  no  Previous similar problem: yes with hip  Previous evaluation:  YES- imaging on hip 02/11/2021  Precipitating or alleviating factors:  Aggravating factors include: sitting, standing, walking, climbing stairs and overuse  Therapies tried and outcome: rest/inactivity and Ibuprofen    Elbow, left - since first week of PT.  Medial aspect.  Radiates up to shoulder.  No numbness or weakness.    Concern - hip injection - advised by PT Kip - for trochanteric bursitis.      Review of Systems   Constitutional, HEENT, cardiovascular, pulmonary, gi and gu systems are negative, except as otherwise  "noted.      Objective    /80 (BP Location: Right arm, Patient Position: Sitting, Cuff Size: Adult Large)   Pulse 84   Temp 97.6  F (36.4  C) (Tympanic)   Resp 18   Ht 1.664 m (5' 5.5\")   Wt 128.4 kg (283 lb)   SpO2 99%   BMI 46.38 kg/m    Body mass index is 46.38 kg/m .  Physical Exam   GENERAL: alert, no distress and over weight  MS: normal muscle tone, peripheral pulses normal and tenderness to palpation right lateral hip - greater trochanteric bursa; also tender left elbow, medial epicondyle, worse with resisted movement; normal  strength  SKIN: no suspicious lesions or rashes  NEURO: Normal strength and tone, mentation intact and speech normal  PSYCH: mentation appears normal, affect normal/bright    Labs pending              PROCEDURE:  JOINT ASPIRATION/INJECTION.         After a discussion of risks, benefits and side effects of procedure, informed patient consent was obtained.       The right hip was prepped and draped in the usual clean fashion (sterile not required for this procedure).      ASPIRATION: Not performed.     INJECTION:  Using 5 cc of 1% lidocaine mixed                           with 40 mg of Kenalog, the right hip trochanteric bursa was successfully injected                           without complication.    "

## 2021-03-23 ENCOUNTER — OFFICE VISIT (OUTPATIENT)
Dept: PSYCHOLOGY | Facility: OTHER | Age: 46
End: 2021-03-23
Attending: COUNSELOR
Payer: COMMERCIAL

## 2021-03-23 DIAGNOSIS — F50.811 BINGE-EATING DISORDER, MODERATE: Primary | ICD-10-CM

## 2021-03-23 DIAGNOSIS — F41.1 GAD (GENERALIZED ANXIETY DISORDER): ICD-10-CM

## 2021-03-23 PROCEDURE — 90837 PSYTX W PT 60 MINUTES: CPT | Performed by: COUNSELOR

## 2021-03-23 NOTE — PROGRESS NOTES
The author of this note documented a reason for not sharing it with the patient.    Counseling Progress Note    Client Name: Vannessa Molina    Date of Service (when I saw the patient): 03/23/2021    Service Type: Individual Psychotherapy    Session Start Time:  10:00am  Session End Time: 11:00am  Session Length: I spent 53+ minutes performing psychotherapy during this office visit.  Session Number: 5    DSM5 Diagnoses: 300.02 (F41.1) Generalized Anxiety Disorder; 307.51 (F50.8) Binge-Eating Disorder, Severity: Moderate    Attendees: Client     Health Maintenance Topics with due status: Due Soon       Topic Date Due    NIRAJ ASSESSMENT 02/17/2021       Treatment Plan Due Date: 05/12/2021  Diagnostic Assessment Due Date: 2/2/2022    DATA    Treatment Objective(s) Addressed in This Session: Reduce anxiety, Improve self-acceptance, focus on self - happiness, acceptance, feeling content    Progress on / Status of Treatment Objective(s) / Homework: Vannessa worked through the ACT Life Map today. She was open, honest and appeared committed to this process.    Intervention: ACT - worked through life map, identified an area of focus for the week.     Current Stressors / Issues: Worked 72 hours last week, physical health    Medication Review: Yes - reviewed, several new medications for new diagnosis of bursitis and arthritis    Medication Compliance: Takes medications as prescribed    Changes in Health: None noted    Chemical Use Review:   Substance Use: No    Tobacco Use: No    ASSESSMENT: Current Emotional / Mental Status (status of significant symptoms):  Risk status no evidence of suicidal or homicidal ideation  Client denies current fears or concerns for personal safety.  A safety and risk management plan has not been developed at this time; however client was given the after-hours number should there be a change in any of these risk factors.    Appearance:                            Appropriate   Eye Contact:                            Good   Psychomotor Behavior:          Normal   Attitude:                                   Cooperative  Friendly Pleasant  Orientation:                             All  Speech              Rate / Production:       Normal/ Responsive Normal               Volume:                       Normal   Mood:                                      Normal  Affect:                                      Appropriate   Thought Content:                    Clear   Thought Form:                        Coherent  Logical   Insight:                                     Good     Collateral Reports Completed: PHQ9 & GAD7      PLAN: Weekly therapy sessions. Continue with ACT. Focus on her self - acceptance, happiness, health, stability. Check-in on what things she noticed - what is the private experience she has that leads to eating and sleeping?      Pattie Bucio, [facility]

## 2021-03-25 ENCOUNTER — OFFICE VISIT (OUTPATIENT)
Dept: FAMILY MEDICINE | Facility: OTHER | Age: 46
End: 2021-03-25
Attending: FAMILY MEDICINE
Payer: COMMERCIAL

## 2021-03-25 VITALS
RESPIRATION RATE: 18 BRPM | TEMPERATURE: 97.6 F | HEIGHT: 66 IN | SYSTOLIC BLOOD PRESSURE: 120 MMHG | WEIGHT: 283 LBS | DIASTOLIC BLOOD PRESSURE: 80 MMHG | OXYGEN SATURATION: 99 % | BODY MASS INDEX: 45.48 KG/M2 | HEART RATE: 84 BPM

## 2021-03-25 DIAGNOSIS — M77.02 MEDIAL EPICONDYLITIS OF ELBOW, LEFT: ICD-10-CM

## 2021-03-25 DIAGNOSIS — M25.50 ARTHRALGIA, UNSPECIFIED JOINT: Primary | ICD-10-CM

## 2021-03-25 DIAGNOSIS — M70.61 TROCHANTERIC BURSITIS OF RIGHT HIP: ICD-10-CM

## 2021-03-25 LAB
CRP SERPL-MCNC: 13 MG/L (ref 0–8)
ERYTHROCYTE [SEDIMENTATION RATE] IN BLOOD BY WESTERGREN METHOD: 24 MM/H (ref 0–20)
TSH SERPL DL<=0.005 MIU/L-ACNC: 0.99 MU/L (ref 0.4–4)

## 2021-03-25 PROCEDURE — 84443 ASSAY THYROID STIM HORMONE: CPT | Performed by: FAMILY MEDICINE

## 2021-03-25 PROCEDURE — 20610 DRAIN/INJ JOINT/BURSA W/O US: CPT | Mod: RT | Performed by: FAMILY MEDICINE

## 2021-03-25 PROCEDURE — 86618 LYME DISEASE ANTIBODY: CPT | Performed by: FAMILY MEDICINE

## 2021-03-25 PROCEDURE — 86431 RHEUMATOID FACTOR QUANT: CPT | Performed by: FAMILY MEDICINE

## 2021-03-25 PROCEDURE — 99213 OFFICE O/P EST LOW 20 MIN: CPT | Mod: 25 | Performed by: FAMILY MEDICINE

## 2021-03-25 PROCEDURE — 86140 C-REACTIVE PROTEIN: CPT | Performed by: FAMILY MEDICINE

## 2021-03-25 PROCEDURE — 86200 CCP ANTIBODY: CPT | Performed by: FAMILY MEDICINE

## 2021-03-25 PROCEDURE — 36415 COLL VENOUS BLD VENIPUNCTURE: CPT | Performed by: FAMILY MEDICINE

## 2021-03-25 PROCEDURE — 85652 RBC SED RATE AUTOMATED: CPT | Performed by: FAMILY MEDICINE

## 2021-03-25 RX ORDER — TRIAMCINOLONE ACETONIDE 40 MG/ML
40 INJECTION, SUSPENSION INTRA-ARTICULAR; INTRAMUSCULAR ONCE
Status: COMPLETED | OUTPATIENT
Start: 2021-03-25 | End: 2021-03-25

## 2021-03-25 RX ORDER — LIDOCAINE HYDROCHLORIDE 10 MG/ML
5 INJECTION, SOLUTION INFILTRATION; PERINEURAL ONCE
Status: DISCONTINUED | OUTPATIENT
Start: 2021-03-25 | End: 2021-03-25 | Stop reason: CLARIF

## 2021-03-25 RX ADMIN — TRIAMCINOLONE ACETONIDE 40 MG: 40 INJECTION, SUSPENSION INTRA-ARTICULAR; INTRAMUSCULAR at 11:45

## 2021-03-25 ASSESSMENT — PAIN SCALES - GENERAL: PAINLEVEL: MODERATE PAIN (5)

## 2021-03-25 ASSESSMENT — MIFFLIN-ST. JEOR: SCORE: 1942.49

## 2021-03-25 NOTE — PATIENT INSTRUCTIONS
Will call with lab results.  Ice right hip, left elbow.    Injection done.  Call next week if no relief provided.      Patient Education     Understanding Trochanteric Bursitis    A bursa is a thin, slippery, sac-like film. It contains a small amount of fluid. This structure is found between bones and soft tissues in and around joints. A bursa cushions and protects a joint. It keeps parts of a joint from rubbing against each other. If a bursa becomes inflamed and irritated, it's known as bursitis.   The trochanteric bursa is found on the hip joint. It lies on top of the bump at the top of the thighbone called the greater trochanter. Inflammation of this bursa is called trochanteric bursitis.   How to say it   [id number]  Causes of trochanteric bursitis  Causes may include:    Overuse of the hip during running or other sports, dance, or work    Falling on or irritation to the side of the hip  This condition may occur along with other problems, such as osteoarthritis of the hip or knee, or low back problems. In rare cases, it may occur after hip surgery.   Symptoms of trochanteric bursitis    Pain or aching on the side of the hip. It's often felt as a sharp, intense pain. The pain may travel down the leg.    Swelling, tenderness, or warmth on the side of the hip at the bony bump at the top of the thigh    Treatment for trochanteric bursitis  These may include:    Resting the hip. This allows the bursa to heal.    Prescription or over-the-counter pain medicines. These help reduce inflammation, swelling, and pain. NSAIDs (nonsteroidal anti-inflammatory drugs) are the most common medicines used. Medicines may be prescribed or bought over the counter. They may be given as pills. Or they may be put on the skin as a gel, cream, or patch.    Cold packs and heat packs. These help reduce pain and swelling.    Stretching and strengthening exercises. These improve flexibility and strength around the hip.    Physical  therapy. This includes exercises or other treatments.    Injections of medicine into the bursa.  This may help reduce inflammation and relieve symptoms. The medicine is usually a corticosteroid. This is a strong anti-inflammatory medicine.  Possible complications  If you don t give your hip time to heal, the problem may not go away, may return, or may get worse. Rest and treat your hip as directed.   When to call your healthcare provider  Call your healthcare provider right away if you have any of these:    Fever of 100.4 F (38 C) or higher, or as directed by your provider    Redness, swelling, or warmth that gets worse    Symptoms that don t get better with prescribed medicines, or get worse    New symptoms  [employer] last reviewed this educational content on 6/1/2019 2000-2020 [employer]. All rights reserved. This information is not intended as a substitute for professional medical care. Always follow your healthcare professional's instructions.           Patient Education     Understanding Medial Epicondylitis    Several muscles attach to the arm at the elbow joint. The tough bands of tissue that attach muscle to bones are called tendons. The bone in the upper arm has knobs on the farthest end called epicondyles. Tendons attach some arm muscles to these knobs. The tissues in this area can become irritated.   Epicondylitis is the medical term for a painful elbow over the epicondyle. Medial refers to the inner side of the elbow. Medial epicondylitis is sometimes called  golfer s elbow.     How to say it  BRIANNA-chris-mayra [id number]-[id number]   Causes of medial epicondylitis  A painful inner elbow may be caused by:    Using an elbow or hand the same way over and over    Using poor form or too much force in a sport such as golf, tennis, or baseball    Lifting too heavy a weight    Other injuries to the arm or elbow  Symptoms of medial epicondylitis    Pain or tenderness on the inside of the elbow that  may travel down the forearm    Pain when moving the wrist    Pain or weakness when gripping something    A crackling sound or grating feeling when moving the elbow    Treatment for medial epicondylitis  Treatments may include:    Avoiding or changing the action that caused the problem. This helps prevent irritating the tissues more.    Prescription or over-the-counter medicines. These help reduce inflammation, swelling, and pain.    Braces. A counter-force brace can help reduce tendon strain. This allows the joint to heal.    Cold or heat packs. These help reduce pain and swelling.    Stretching and other exercises. These improve flexibility and strength.    Physical therapy. This may include exercises or other treatments.    Injections of medicine. This may relieve symptoms.  If other treatments don't relieve symptoms, you may need surgery.   Possible complications  If you don t give your elbow time to heal, symptoms may return or get worse. Follow your healthcare provider s instructions on resting and treating your elbow.   When to call your healthcare provider  Call your healthcare provider right away if you have any of these:    Fever of 100.4 F (38 C) or higher, or as directed by your provider    Chills    Redness, swelling, or warmth that gets worse    Symptoms that don t get better with prescribed medicines, or get worse    New symptoms  [employer] last reviewed this educational content on 6/1/2019 2000-2020 [employer]. All rights reserved. This information is not intended as a substitute for professional medical care. Always follow your healthcare professional's instructions.

## 2021-03-25 NOTE — NURSING NOTE
"Chief Complaint   Patient presents with     Musculoskeletal Problem       Initial /80 (BP Location: Right arm, Patient Position: Sitting, Cuff Size: Adult Large)   Pulse 84   Temp 97.6  F (36.4  C) (Tympanic)   Resp 18   Ht 1.664 m (5' 5.5\")   Wt 128.4 kg (283 lb)   SpO2 99%   BMI 46.38 kg/m   Estimated body mass index is 46.38 kg/m  as calculated from the following:    Height as of this encounter: 1.664 m (5' 5.5\").    Weight as of this encounter: 128.4 kg (283 lb).  Medication Reconciliation: complete  Elen Lakhani LPN  "

## 2021-03-27 LAB — B BURGDOR IGG+IGM SER QL: 0.04 (ref 0–0.89)

## 2021-03-28 LAB — CCP AB SER IA-ACNC: 2 U/ML

## 2021-03-29 LAB — RHEUMATOID FACT SER NEPH-ACNC: <7 IU/ML (ref 0–20)

## 2021-04-06 ENCOUNTER — OFFICE VISIT (OUTPATIENT)
Dept: PSYCHOLOGY | Facility: OTHER | Age: 46
End: 2021-04-06
Attending: COUNSELOR
Payer: COMMERCIAL

## 2021-04-06 DIAGNOSIS — F50.811 BINGE-EATING DISORDER, MODERATE: ICD-10-CM

## 2021-04-06 DIAGNOSIS — F41.1 GAD (GENERALIZED ANXIETY DISORDER): Primary | ICD-10-CM

## 2021-04-06 PROCEDURE — 90837 PSYTX W PT 60 MINUTES: CPT | Performed by: COUNSELOR

## 2021-04-06 ASSESSMENT — ANXIETY QUESTIONNAIRES
GAD7 TOTAL SCORE: 0
1. FEELING NERVOUS, ANXIOUS, OR ON EDGE: NOT AT ALL
IF YOU CHECKED OFF ANY PROBLEMS ON THIS QUESTIONNAIRE, HOW DIFFICULT HAVE THESE PROBLEMS MADE IT FOR YOU TO DO YOUR WORK, TAKE CARE OF THINGS AT HOME, OR GET ALONG WITH OTHER PEOPLE: NOT DIFFICULT AT ALL
5. BEING SO RESTLESS THAT IT IS HARD TO SIT STILL: NOT AT ALL
3. WORRYING TOO MUCH ABOUT DIFFERENT THINGS: NOT AT ALL
6. BECOMING EASILY ANNOYED OR IRRITABLE: NOT AT ALL
2. NOT BEING ABLE TO STOP OR CONTROL WORRYING: NOT AT ALL
7. FEELING AFRAID AS IF SOMETHING AWFUL MIGHT HAPPEN: NOT AT ALL

## 2021-04-06 ASSESSMENT — PATIENT HEALTH QUESTIONNAIRE - PHQ9
SUM OF ALL RESPONSES TO PHQ QUESTIONS 1-9: 6
5. POOR APPETITE OR OVEREATING: NOT AT ALL
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
## 2021-04-07 ASSESSMENT — ANXIETY QUESTIONNAIRES: GAD7 TOTAL SCORE: 0

## 2021-04-14 ENCOUNTER — HOSPITAL ENCOUNTER (OUTPATIENT)
Dept: MRI IMAGING | Facility: HOSPITAL | Age: 46
Discharge: HOME | End: 2021-04-14
Attending: FAMILY MEDICINE | Admitting: FAMILY MEDICINE
Payer: COMMERCIAL

## 2021-04-14 DIAGNOSIS — M25.551 HIP PAIN, RIGHT: ICD-10-CM

## 2021-04-14 PROCEDURE — 73721 MRI JNT OF LWR EXTRE W/O DYE: CPT | Mod: RT

## 2021-04-15 ENCOUNTER — TELEPHONE (OUTPATIENT)
Dept: FAMILY MEDICINE | Facility: OTHER | Age: 46
End: 2021-04-15

## 2021-04-15 DIAGNOSIS — M16.11 ARTHRITIS OF RIGHT HIP: ICD-10-CM

## 2021-04-15 DIAGNOSIS — M25.551 HIP PAIN, RIGHT: Primary | ICD-10-CM

## 2021-04-20 ENCOUNTER — OFFICE VISIT (OUTPATIENT)
Dept: PSYCHOLOGY | Facility: OTHER | Age: 46
End: 2021-04-20
Attending: COUNSELOR
Payer: COMMERCIAL

## 2021-04-20 DIAGNOSIS — F50.811 BINGE-EATING DISORDER, MODERATE: ICD-10-CM

## 2021-04-20 DIAGNOSIS — F41.1 GAD (GENERALIZED ANXIETY DISORDER): Primary | ICD-10-CM

## 2021-04-20 DIAGNOSIS — F33.9 EPISODE OF RECURRENT MAJOR DEPRESSIVE DISORDER, UNSPECIFIED DEPRESSION EPISODE SEVERITY (H): ICD-10-CM

## 2021-04-20 PROCEDURE — 90837 PSYTX W PT 60 MINUTES: CPT | Performed by: COUNSELOR

## 2021-04-21 ENCOUNTER — HOSPITAL ENCOUNTER (OUTPATIENT)
Facility: HOSPITAL | Age: 46
Discharge: HOME | End: 2021-04-21
Attending: RADIOLOGY | Admitting: RADIOLOGY
Payer: COMMERCIAL

## 2021-04-21 ENCOUNTER — HOSPITAL ENCOUNTER (OUTPATIENT)
Dept: INTERVENTIONAL RADIOLOGY/VASCULAR | Facility: HOSPITAL | Age: 46
Discharge: HOME | End: 2021-04-21
Attending: FAMILY MEDICINE | Admitting: RADIOLOGY
Payer: COMMERCIAL

## 2021-04-21 DIAGNOSIS — M25.551 HIP PAIN, RIGHT: ICD-10-CM

## 2021-04-21 DIAGNOSIS — M16.11 ARTHRITIS OF RIGHT HIP: ICD-10-CM

## 2021-04-21 PROCEDURE — 250N000009 HC RX 250: Performed by: RADIOLOGY

## 2021-04-21 PROCEDURE — 255N000002 HC RX 255 OP 636: Performed by: RADIOLOGY

## 2021-04-21 PROCEDURE — 250N000011 HC RX IP 250 OP 636: Performed by: RADIOLOGY

## 2021-04-21 PROCEDURE — 20610 DRAIN/INJ JOINT/BURSA W/O US: CPT | Mod: RT

## 2021-04-21 RX ORDER — LIDOCAINE HYDROCHLORIDE 10 MG/ML
10 INJECTION, SOLUTION INFILTRATION; PERINEURAL ONCE
Status: COMPLETED | OUTPATIENT
Start: 2021-04-21 | End: 2021-04-21

## 2021-04-21 RX ORDER — LIDOCAINE HYDROCHLORIDE 10 MG/ML
INJECTION, SOLUTION EPIDURAL; INFILTRATION; INTRACAUDAL; PERINEURAL
Status: DISCONTINUED
Start: 2021-04-21 | End: 2021-04-21 | Stop reason: HOSPADM

## 2021-04-21 RX ORDER — TRIAMCINOLONE ACETONIDE 40 MG/ML
40 INJECTION, SUSPENSION INTRA-ARTICULAR; INTRAMUSCULAR ONCE
Status: COMPLETED | OUTPATIENT
Start: 2021-04-21 | End: 2021-04-21

## 2021-04-21 RX ORDER — TRIAMCINOLONE ACETONIDE 40 MG/ML
INJECTION, SUSPENSION INTRA-ARTICULAR; INTRAMUSCULAR
Status: DISCONTINUED
Start: 2021-04-21 | End: 2021-04-21 | Stop reason: HOSPADM

## 2021-04-21 RX ORDER — IOPAMIDOL 612 MG/ML
50 INJECTION, SOLUTION INTRAVASCULAR ONCE
Status: COMPLETED | OUTPATIENT
Start: 2021-04-21 | End: 2021-04-21

## 2021-04-21 RX ADMIN — IOPAMIDOL 3 ML: 612 INJECTION, SOLUTION INTRAVENOUS at 08:25

## 2021-04-21 RX ADMIN — TRIAMCINOLONE ACETONIDE 40 MG: 40 INJECTION, SUSPENSION INTRA-ARTICULAR; INTRAMUSCULAR at 08:24

## 2021-04-21 RX ADMIN — LIDOCAINE HYDROCHLORIDE 10 ML: 10 INJECTION, SOLUTION EPIDURAL; INFILTRATION; INTRACAUDAL; PERINEURAL at 08:25

## 2021-04-21 ASSESSMENT — ANXIETY QUESTIONNAIRES
IF YOU CHECKED OFF ANY PROBLEMS ON THIS QUESTIONNAIRE, HOW DIFFICULT HAVE THESE PROBLEMS MADE IT FOR YOU TO DO YOUR WORK, TAKE CARE OF THINGS AT HOME, OR GET ALONG WITH OTHER PEOPLE: NOT DIFFICULT AT ALL
7. FEELING AFRAID AS IF SOMETHING AWFUL MIGHT HAPPEN: NOT AT ALL
3. WORRYING TOO MUCH ABOUT DIFFERENT THINGS: NOT AT ALL
6. BECOMING EASILY ANNOYED OR IRRITABLE: SEVERAL DAYS
1. FEELING NERVOUS, ANXIOUS, OR ON EDGE: NOT AT ALL
2. NOT BEING ABLE TO STOP OR CONTROL WORRYING: NOT AT ALL
GAD7 TOTAL SCORE: 2
5. BEING SO RESTLESS THAT IT IS HARD TO SIT STILL: NOT AT ALL

## 2021-04-21 ASSESSMENT — PATIENT HEALTH QUESTIONNAIRE - PHQ9
SUM OF ALL RESPONSES TO PHQ QUESTIONS 1-9: 7
5. POOR APPETITE OR OVEREATING: SEVERAL DAYS
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
## 2021-04-21 NOTE — DISCHARGE INSTRUCTIONS
Cell number on file:    Telephone Information:   Mobile [phone]     Is it ok to leave a message at this number(s)? Yes    Dr Esteves completed your procedure on 4/21/2021.    Current Pain Level (0-10 Scale): 5/10  Post Pain Level (0-10):  0/10    Radiology Discharge instructions for Steroid Injection    Activity Level:     Do not do any heavy activity or exercise for 24 hours.   Do not drive for 4 hours after your injection.  Diet:   Return to your normal diet.  Medications:   If you have stopped taking your Aspirin, Coumadin/Warfarin, Ibuprofen, or any   other blood thinner for this procedure you may resume in the morning unless   your primary care provider has given you other instructions.    Diabetics may see an increase in blood sugar after steroid injections. If you are concerned about your blood sugar, please contact your family doctor.    Site Care:  Remove the bandage and bathe or shower the morning after the procedure.      Please allow two weeks to experience improvement in your pain.  If you have any further issues, please contact your provider.    Call your Primary Care Provider if you have the following (if your primary care provider is not available please seek emergency care):   Nausea with vomiting   Severe headache   Drowsiness or confusion   Redness or drainage at the injection or puncture site   Temperature over 101 degrees F   Other concerns   Worsening back pain   Stiff neck

## 2021-04-21 NOTE — IP AVS SNAPSHOT
[facility]  [address] [address]  Phone: [phone]  Fax: [phone]                                    After Visit Summary   4/21/2021    Vannessa Molina    MRN: [id number]           After Visit Summary Signature Page    I have received my discharge instructions, and my questions have been answered. I have discussed any challenges I see with this plan with the nurse or doctor.    ..........................................................................................................................................  Patient/Patient Representative Signature      ..........................................................................................................................................  Patient Representative Print Name and Relationship to Patient    ..................................................               ................................................  Date                                   Time    ..........................................................................................................................................  Reviewed by Signature/Title    ...................................................              ..............................................  Date                                               Time          22EPIC Rev 08/18

## 2021-04-22 ASSESSMENT — ANXIETY QUESTIONNAIRES: GAD7 TOTAL SCORE: 2

## 2021-04-29 ENCOUNTER — TRANSFERRED RECORDS (OUTPATIENT)
Dept: HEALTH INFORMATION MANAGEMENT | Facility: CLINIC | Age: 46
End: 2021-04-29

## 2021-05-04 ENCOUNTER — OFFICE VISIT (OUTPATIENT)
Dept: PSYCHOLOGY | Facility: OTHER | Age: 46
End: 2021-05-04
Attending: COUNSELOR
Payer: COMMERCIAL

## 2021-05-04 DIAGNOSIS — F50.811 BINGE-EATING DISORDER, MODERATE: ICD-10-CM

## 2021-05-04 DIAGNOSIS — F41.1 GAD (GENERALIZED ANXIETY DISORDER): Primary | ICD-10-CM

## 2021-05-04 PROCEDURE — 90837 PSYTX W PT 60 MINUTES: CPT | Performed by: COUNSELOR

## 2021-05-04 ASSESSMENT — ANXIETY QUESTIONNAIRES
3. WORRYING TOO MUCH ABOUT DIFFERENT THINGS: NOT AT ALL
5. BEING SO RESTLESS THAT IT IS HARD TO SIT STILL: NOT AT ALL
IF YOU CHECKED OFF ANY PROBLEMS ON THIS QUESTIONNAIRE, HOW DIFFICULT HAVE THESE PROBLEMS MADE IT FOR YOU TO DO YOUR WORK, TAKE CARE OF THINGS AT HOME, OR GET ALONG WITH OTHER PEOPLE: NOT DIFFICULT AT ALL
2. NOT BEING ABLE TO STOP OR CONTROL WORRYING: NOT AT ALL
7. FEELING AFRAID AS IF SOMETHING AWFUL MIGHT HAPPEN: NOT AT ALL
1. FEELING NERVOUS, ANXIOUS, OR ON EDGE: NOT AT ALL
6. BECOMING EASILY ANNOYED OR IRRITABLE: SEVERAL DAYS
GAD7 TOTAL SCORE: 1

## 2021-05-04 ASSESSMENT — PATIENT HEALTH QUESTIONNAIRE - PHQ9
SUM OF ALL RESPONSES TO PHQ QUESTIONS 1-9: 9
5. POOR APPETITE OR OVEREATING: NOT AT ALL
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
## 2021-05-05 ENCOUNTER — HOSPITAL ENCOUNTER (OUTPATIENT)
Dept: PHYSICAL THERAPY | Facility: HOSPITAL | Age: 46
Setting detail: THERAPIES SERIES
End: 2021-05-05
Attending: FAMILY MEDICINE
Payer: COMMERCIAL

## 2021-05-05 PROCEDURE — 97110 THERAPEUTIC EXERCISES: CPT | Mod: GP

## 2021-05-05 ASSESSMENT — ANXIETY QUESTIONNAIRES: GAD7 TOTAL SCORE: 1

## 2021-05-12 ENCOUNTER — OFFICE VISIT (OUTPATIENT)
Dept: PSYCHOLOGY | Facility: OTHER | Age: 46
End: 2021-05-12
Attending: COUNSELOR
Payer: COMMERCIAL

## 2021-05-12 DIAGNOSIS — F50.811 BINGE-EATING DISORDER, MODERATE: ICD-10-CM

## 2021-05-12 DIAGNOSIS — F41.1 GAD (GENERALIZED ANXIETY DISORDER): Primary | ICD-10-CM

## 2021-05-12 DIAGNOSIS — F33.9 EPISODE OF RECURRENT MAJOR DEPRESSIVE DISORDER, UNSPECIFIED DEPRESSION EPISODE SEVERITY (H): ICD-10-CM

## 2021-05-12 PROCEDURE — 90837 PSYTX W PT 60 MINUTES: CPT | Performed by: COUNSELOR
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
## 2021-05-19 ENCOUNTER — OFFICE VISIT (OUTPATIENT)
Dept: PSYCHOLOGY | Facility: OTHER | Age: 46
End: 2021-05-19
Attending: COUNSELOR
Payer: COMMERCIAL

## 2021-05-19 ENCOUNTER — HOSPITAL ENCOUNTER (OUTPATIENT)
Dept: PHYSICAL THERAPY | Facility: HOSPITAL | Age: 46
Setting detail: THERAPIES SERIES
End: 2021-05-19
Attending: FAMILY MEDICINE
Payer: COMMERCIAL

## 2021-05-19 ENCOUNTER — TELEPHONE (OUTPATIENT)
Dept: FAMILY MEDICINE | Facility: OTHER | Age: 46
End: 2021-05-19

## 2021-05-19 DIAGNOSIS — F33.9 EPISODE OF RECURRENT MAJOR DEPRESSIVE DISORDER, UNSPECIFIED DEPRESSION EPISODE SEVERITY (H): ICD-10-CM

## 2021-05-19 DIAGNOSIS — F50.811 BINGE-EATING DISORDER, MODERATE: ICD-10-CM

## 2021-05-19 DIAGNOSIS — F41.1 GAD (GENERALIZED ANXIETY DISORDER): Primary | ICD-10-CM

## 2021-05-19 DIAGNOSIS — M54.16 LUMBAR RADICULOPATHY: Primary | ICD-10-CM

## 2021-05-19 PROCEDURE — 90837 PSYTX W PT 60 MINUTES: CPT | Performed by: COUNSELOR

## 2021-05-19 PROCEDURE — 97110 THERAPEUTIC EXERCISES: CPT | Mod: GP

## 2021-05-19 RX ORDER — METHYLPREDNISOLONE 4 MG
TABLET, DOSE PACK ORAL
Qty: 21 TABLET | Refills: 0 | Status: CANCELLED | OUTPATIENT
Start: 2021-05-19

## 2021-05-19 NOTE — TELEPHONE ENCOUNTER
I last saw her 3/25/21.    She did have abnormal hip imaging - MRI - and we referred her to orthopedics.  I did not get copy of consultation?    Would suggest follow up visit.    Will ask [facility] to get records.

## 2021-05-19 NOTE — TELEPHONE ENCOUNTER
Edgar Suarez - I just got done seeing Vannessa for a therapy appointment. She is having a pretty intense bout of back pain which is causing intense R sided radicular pain. She is having trouble working and moving in general. I was wondering if she would benefit from a possible oral steroid (prednisone) to help with the nerve inflammation/irritation to help get things moving a little bit? Let me know what you think - thanks for your help!

## 2021-05-20 RX ORDER — METHYLPREDNISOLONE 4 MG
TABLET, DOSE PACK ORAL
Qty: 21 TABLET | Refills: 0 | Status: SHIPPED | OUTPATIENT
Start: 2021-05-20 | End: 2021-06-23

## 2021-05-20 ASSESSMENT — ANXIETY QUESTIONNAIRES
2. NOT BEING ABLE TO STOP OR CONTROL WORRYING: NOT AT ALL
6. BECOMING EASILY ANNOYED OR IRRITABLE: NOT AT ALL
7. FEELING AFRAID AS IF SOMETHING AWFUL MIGHT HAPPEN: NOT AT ALL
IF YOU CHECKED OFF ANY PROBLEMS ON THIS QUESTIONNAIRE, HOW DIFFICULT HAVE THESE PROBLEMS MADE IT FOR YOU TO DO YOUR WORK, TAKE CARE OF THINGS AT HOME, OR GET ALONG WITH OTHER PEOPLE: NOT DIFFICULT AT ALL
GAD7 TOTAL SCORE: 0
1. FEELING NERVOUS, ANXIOUS, OR ON EDGE: NOT AT ALL
3. WORRYING TOO MUCH ABOUT DIFFERENT THINGS: NOT AT ALL
5. BEING SO RESTLESS THAT IT IS HARD TO SIT STILL: NOT AT ALL

## 2021-05-20 ASSESSMENT — PATIENT HEALTH QUESTIONNAIRE - PHQ9
SUM OF ALL RESPONSES TO PHQ QUESTIONS 1-9: 4
5. POOR APPETITE OR OVEREATING: NOT AT ALL

## 2021-05-20 NOTE — TELEPHONE ENCOUNTER
Dr Boland's consult reviewed.   Patient right hip injection prior with relief.  Could repeat in future if needed.  Had right knee injection with Dr Boland 4/29/21.  Weight management discussed.    Will send medrol steroid taper back for back pain, radicular, as requested by physical therapist.  Follow up in clinic if no improvement.    Notify patient.    CC therapist.

## 2021-05-20 NOTE — PROGRESS NOTES
The author of this note documented a reason for not sharing it with the patient.    Counseling Progress Note    Client Name: Vannessa Molina    Date of Service (when I saw the patient): 05/19/2021    Service Type: Individual Psychotherapy    Session Start Time:  2:00pm  Session End Time: 3:00pm  Session Length: I spent 53+ minutes performing psychotherapy during this office visit.  Session Number: 10    DSM5 Diagnoses: 300.02 (F41.1) Generalized Anxiety Disorder; 307.51 (F50.8) Binge-Eating Disorder, Severity: Moderate    Attendees: Client     Health Maintenance Topics with due status: Due Soon       Topic Date Due    NIRAJ ASSESSMENT 02/17/2021       Treatment Plan Due Date: 05/12/2021  Diagnostic Assessment Due Date: 2/2/2022    DATA    Treatment Objective(s) Addressed in This Session: Improve self-acceptance, focus on self - happiness, acceptance, feeling content, improve ability to feel / experience emotions, reduce self-defeating behaviors    Progress on / Status of Treatment Objective(s) / Homework: Vannessa continues to struggle with physical pain. She is going to PT for her hips and today the therapist says he thinks she may have something going on with her lower back that is causing shooting pain in her legs. Vannessa reports having a good week of sticking with her eating plan. She continues to struggle with taking care of herself emotionally and mentally. She continues to beat-up on herself. Vannessa says she thought more about her childhood in a way she had not explored before.     Intervention: Active listening, reflection and validation. Explored ways she practiced self-care and compassion this week and ways she wants to improve on this.     Current Stressors / Issues: Mental health symptoms, physical pain    Medication Review: No new medications. Her PT put in a request for an antiinflammatory to help with her leg pain.    Medication Compliance: Takes medications as prescribed    Changes in Health: Hip and knee pain is  significantly better at this time.     Chemical Use Review:   Substance Use: No    Tobacco Use: No    ASSESSMENT: Current Emotional / Mental Status (status of significant symptoms):  Risk status no evidence of suicidal or homicidal ideation  Client denies current fears or concerns for personal safety.  A safety and risk management plan has not been developed at this time; however client was given the after-hours number should there be a change in any of these risk factors.    Appearance:                            Appropriate   Eye Contact:                           Good   Psychomotor Behavior:          Fidgety  Attitude:                                   Anxious  Orientation:                             All  Speech              Rate / Production:      Rapid speech, rambling              Volume:                       Normal   Mood:                                      Normal  Affect:                                      Flat  Thought Content:                    Clear   Thought Form:                        Tangentile  Insight:                                     Fair     Collateral Reports Completed: PHQ9 & GAD7      PLAN: Weekly therapy sessions. Continue working on treatment plan goals.    PHAN Shah

## 2021-05-21 ENCOUNTER — TELEPHONE (OUTPATIENT)
Dept: FAMILY MEDICINE | Facility: OTHER | Age: 46
End: 2021-05-21

## 2021-05-21 ENCOUNTER — HOSPITAL ENCOUNTER (OUTPATIENT)
Dept: PHYSICAL THERAPY | Facility: HOSPITAL | Age: 46
Setting detail: THERAPIES SERIES
End: 2021-05-21
Attending: FAMILY MEDICINE
Payer: COMMERCIAL

## 2021-05-21 PROCEDURE — 97110 THERAPEUTIC EXERCISES: CPT | Mod: GP

## 2021-05-21 ASSESSMENT — ANXIETY QUESTIONNAIRES: GAD7 TOTAL SCORE: 0

## 2021-05-21 NOTE — TELEPHONE ENCOUNTER
Call from patient stating she received notification from PT on activities patient should not be doing at the present time (No bending, lifting, twisting or carrying for 2 weeks). Patient requesting provider to include these restrictions in a letter for the patient to provide to her employer.     Patient states she will be in need of the letter soon as possible as she is scheduled to work on 5/22/21. Notified Dr. Suarez is out of the office on 5/22/21, returning on 5/24/21.     Patient can be reached at [phone].

## 2021-05-21 NOTE — LETTER
May 21, 2021      Vannessa Molina  [address]  [place] [address]        To Whom It May Concern:    Vannessa Molina was seen in our clinic. She may return to work with the following: limited to light duty - no use of arms over shoulders, no bending/stooping, no climbing and no lifting on or about 2 weeks.      Sincerely,        Aaliyah Sethi MD

## 2021-05-26 ENCOUNTER — HOSPITAL ENCOUNTER (OUTPATIENT)
Dept: PHYSICAL THERAPY | Facility: HOSPITAL | Age: 46
Setting detail: THERAPIES SERIES
End: 2021-05-26
Attending: FAMILY MEDICINE
Payer: COMMERCIAL

## 2021-05-26 PROCEDURE — 97110 THERAPEUTIC EXERCISES: CPT | Mod: GP

## 2021-05-26 PROCEDURE — 97530 THERAPEUTIC ACTIVITIES: CPT | Mod: GP

## 2021-06-02 ENCOUNTER — MYC MEDICAL ADVICE (OUTPATIENT)
Dept: FAMILY MEDICINE | Facility: OTHER | Age: 46
End: 2021-06-02

## 2021-06-02 ENCOUNTER — HOSPITAL ENCOUNTER (OUTPATIENT)
Dept: PHYSICAL THERAPY | Facility: HOSPITAL | Age: 46
Setting detail: THERAPIES SERIES
End: 2021-06-02
Attending: FAMILY MEDICINE
Payer: COMMERCIAL

## 2021-06-02 DIAGNOSIS — M70.62 TROCHANTERIC BURSITIS OF BOTH HIPS: ICD-10-CM

## 2021-06-02 DIAGNOSIS — M25.552 BILATERAL HIP PAIN: ICD-10-CM

## 2021-06-02 DIAGNOSIS — M25.551 BILATERAL HIP PAIN: ICD-10-CM

## 2021-06-02 DIAGNOSIS — M99.05 SOMATIC DYSFUNCTION OF SPINE AFFECTING PELVIC REGION: ICD-10-CM

## 2021-06-02 DIAGNOSIS — M70.61 TROCHANTERIC BURSITIS OF BOTH HIPS: ICD-10-CM

## 2021-06-02 PROCEDURE — 97110 THERAPEUTIC EXERCISES: CPT | Mod: GP

## 2021-06-02 NOTE — LETTER
[facility] - [place]  [address]  [place] [address]  Phone: [phone]    June 2, 2021    Re:  Vannessa Molina  [address]  [place] [address]          To whom it may concern:    RE: Vannessa Molina    Patient has been participating actively in physical therapy with improvement in symptoms.  Therapist does feel program and restrictions are helpful.    Please continue current restrictions  -light duty - no use of arms over shoulders, no bending/stooping, no climbing and no lifting - for additional 2 weeks - through 6/18/2021.    Please contact me for questions or concerns.      Sincerely,        Aaliyah Sethi MD

## 2021-06-02 NOTE — TELEPHONE ENCOUNTER
Yes - I received a note from the PT department.  See below.  Letter printed.  Please fax and notify patient.    Ami Esteves, PT  Aaliyah Suarez MD; Alex Alonzo, PT             Vannessa Anglin is Alex's patient's, but I have seen her twice and her back pain and numbness/tingling down the leg is much improved. Near full lumbar motion at today's session. Lumbar pain and LE radicular symptoms are improving. Hip and knee pain symptoms remain unchanged.     For work PT had recommended avoiding bending, lifting, twisting and carrying for past two weeks. This in addition to PT exercises and treatment have helped greatly with lumbar symptoms and LE radicular symptoms. I think it would be beneficial to extend this for an additional two weeks to ensure that lumbar symptoms and radicular symptoms do not return. Vannessa stated that she would need something from primary care physician for her work.     Are these activity recommendations agreeable to you? Would you be able to put the activity recommendations in for patient to use at work?     Please let me know if you have any questions. PT will continue to work on hip and knee strengthening and exercises.

## 2021-06-09 ENCOUNTER — HOSPITAL ENCOUNTER (OUTPATIENT)
Dept: PHYSICAL THERAPY | Facility: HOSPITAL | Age: 46
Setting detail: THERAPIES SERIES
End: 2021-06-09
Attending: FAMILY MEDICINE
Payer: COMMERCIAL

## 2021-06-09 PROCEDURE — 97140 MANUAL THERAPY 1/> REGIONS: CPT | Mod: GP

## 2021-06-09 PROCEDURE — 97110 THERAPEUTIC EXERCISES: CPT | Mod: GP

## 2021-06-11 ENCOUNTER — OFFICE VISIT (OUTPATIENT)
Dept: PSYCHOLOGY | Facility: OTHER | Age: 46
End: 2021-06-11
Attending: COUNSELOR
Payer: COMMERCIAL

## 2021-06-11 DIAGNOSIS — F50.811 BINGE-EATING DISORDER, MODERATE: ICD-10-CM

## 2021-06-11 DIAGNOSIS — F41.1 GAD (GENERALIZED ANXIETY DISORDER): Primary | ICD-10-CM

## 2021-06-11 PROCEDURE — 90837 PSYTX W PT 60 MINUTES: CPT | Performed by: COUNSELOR

## 2021-06-11 ASSESSMENT — PATIENT HEALTH QUESTIONNAIRE - PHQ9
SUM OF ALL RESPONSES TO PHQ QUESTIONS 1-9: 8
5. POOR APPETITE OR OVEREATING: NOT AT ALL

## 2021-06-11 ASSESSMENT — ANXIETY QUESTIONNAIRES

## 2021-06-12 ASSESSMENT — ANXIETY QUESTIONNAIRES: GAD7 TOTAL SCORE: 0

## 2021-06-15 NOTE — PROGRESS NOTES
Assessment & Plan     Arthralgia, unspecified joint  Diffuse.  Joint vs muscle vs fascia/myofascial?  Xray of hip joint helped.  Knee did not.   Patient had ortho consult and was not satisfied.  Gave her shot in knee, but did not address other areas.    Pain into left hip and some in left knee.  Pain in back and radicular down right leg.  Prior CCP/RF negative, as well as lyme, tsh.  Repeat inflammatory markers, as were mildly elevated before.  Add CHAVO.  Add xrays.  Referral to physiatry - specialized therapies, injections - joint, trigger points, etc.  Consider weight management program.  Consider MRI - but unable to do multiple joints - await xray first.  Trial of Neurontin for pain.  Considered Cymbalta - but already on Zoloft and does not want to change.  May need Lyrica.  Note for work - see letter.  - Comprehensive metabolic panel (BMP + Alb, Alk Phos, ALT, AST, Total. Bili, TP)  - CRP, inflammation  - ESR: Erythrocyte sedimentation rate  - Anti Nuclear Griselda IgG by IFA with Reflex  - PHYSIATRY REFERRAL  - gabapentin (NEURONTIN) 300 MG capsule; Take 1 capsule (300 mg) by mouth 3 times daily    Acute bilateral low back pain with right-sided sciatica  As aobve.  - XR Lumbar Spine 2/3 Views; Future  - PHYSIATRY REFERRAL    Chronic pain of right knee  As above.  - XR Knee Right 3 Views; Future  - PHYSIATRY REFERRAL    Hip pain, left  As above.  - XR Hip Left 2-3 Views; Future  - PHYSIATRY REFERRAL      Asked [facility] to obtain ortho associates visit note.      Over 40 min spent on visit, interview, exam, charting, review of records, and letter completion.     Patient Instructions   Will call with lab and xray results.  Trial of Neurontin/Gabapentin 300 mg 3 times per day.   Referral to physiatry - comprehensive evaluation given diffuse joint involvement.  [place].  Consider additional imaging, injections, procedures per specialist.        Addendum: did get copy of OA note of Dr Boland's = right hip and right knee  "arthritis.  Did right knee injection.  Advised repeat hip injections as needed, deferring surgery at that time.      Aaliyah Sethi MD  [facility] - KINA Hurd is a 45 year old who presents for the following health issues     HPI       Concern - Arthritis  Onset: 1 year  Description: left hip and knee locks with movement, right hip and knee aches/burns, tingling in left side of body from mid back to right ankle.   Intensity: moderate, severe  Progression of Symptoms:  worsening  Accompanying Signs & Symptoms: fatigue  Previous history of similar problem: no  Precipitating factors:        Worsened by: getting in and out of vehicles, stepping up onto objects.  Alleviating factors:        Improved by: n/a  Therapies tried and outcome: injections- effective, PT- effective, heat and cold compress, used a cane. - not effective    MRI hip - right - advanced arthritis on MR.  Had steroid injection which was helpful.  Now just aches.    Ortho associates - had 1 visit.  Had right knee injection.    \"My left side is way worse.\"  My left hip and knee lock.  Very painful to adduct left leg.    Back pain - aches - more right sided.  Sometimes radiates to foot/ankle on right.  Gives out.  Using cane.  Sometimes right 4th/5th toes numb.    Bowel/bladder - no incontinence, no abnormal bleeding.    Review of Systems   Constitutional, HEENT, cardiovascular, pulmonary, gi and gu systems are negative, except as otherwise noted.      Objective    /84   Pulse 75   Temp 97.9  F (36.6  C)   Ht 1.702 m (5' 7\")   Wt 126.6 kg (279 lb)   SpO2 98%   BMI 43.70 kg/m    Body mass index is 43.7 kg/m .  Physical Exam   GENERAL: alert, no distress and obese  NECK: no adenopathy, no asymmetry, masses, or scars and thyroid normal to palpation  RESP: lungs clear to auscultation - no rales, rhonchi or wheezes  CV: regular rate and rhythm, normal S1 S2, no S3 or S4, no murmur, click or rub, no peripheral " edema and peripheral pulses strong  MS: normal muscle tone, no edema, peripheral pulses normal; patient has significant difficulty with any mobility; uses cane; needs assistance on and off exam table; tender to palpation diffusely - muscles, joints, soft tissue  SKIN: no suspicious lesions or rashes  NEURO: Normal strength and tone, sensory exam grossly normal and mentation intact  PSYCH: mentation appears normal, affect normal/bright

## 2021-06-16 ENCOUNTER — HOSPITAL ENCOUNTER (OUTPATIENT)
Dept: PHYSICAL THERAPY | Facility: HOSPITAL | Age: 46
Setting detail: THERAPIES SERIES
End: 2021-06-16
Attending: FAMILY MEDICINE
Payer: COMMERCIAL

## 2021-06-16 ENCOUNTER — MYC MEDICAL ADVICE (OUTPATIENT)
Dept: FAMILY MEDICINE | Facility: OTHER | Age: 46
End: 2021-06-16

## 2021-06-16 PROCEDURE — 999N000214 HC STATISTIC PT OUTPT VISIT

## 2021-06-16 PROCEDURE — 97530 THERAPEUTIC ACTIVITIES: CPT | Mod: GP

## 2021-06-16 PROCEDURE — 97110 THERAPEUTIC EXERCISES: CPT | Mod: GP

## 2021-06-16 ASSESSMENT — ACTIVITIES OF DAILY LIVING (ADL)
HOW_WOULD_YOU_RATE_THE_OVERALL_FUNCTION_OF_YOUR_KNEE_DURING_YOUR_USUAL_DAILY_ACTIVITIES?: SEVERELY ABNORMAL
SWELLING: THE SYMPTOM AFFECTS MY ACTIVITY SLIGHTLY
STIFFNESS: THE SYMPTOM AFFECTS MY ACTIVITY SLIGHTLY
GO UP STAIRS: ACTIVITY IS VERY DIFFICULT
AS_A_RESULT_OF_YOUR_KNEE_INJURY,_HOW_WOULD_YOU_RATE_YOUR_CURRENT_LEVEL_OF_DAILY_ACTIVITY?: SEVERELY ABNORMAL
PAIN: THE SYMPTOM PREVENTS ME FROM ALL DAILY ACTIVITIES
WALK: ACTIVITY IS VERY DIFFICULT
GIVING WAY, BUCKLING OR SHIFTING OF KNEE: THE SYMPTOM AFFECTS MY ACTIVITY SEVERELY
KNEE_ACTIVITY_OF_DAILY_LIVING_SUM: 19
GO DOWN STAIRS: ACTIVITY IS VERY DIFFICULT
RAW_SCORE: 19
WEAKNESS: THE SYMPTOM AFFECTS MY ACTIVITY SLIGHTLY
KNEEL ON THE FRONT OF YOUR KNEE: I AM UNABLE TO DO THE ACTIVITY
SIT WITH YOUR KNEE BENT: ACTIVITY IS FAIRLY DIFFICULT
SQUAT: I AM UNABLE TO DO THE ACTIVITY
LIMPING: THE SYMPTOM PREVENTS ME FROM ALL DAILY ACTIVITIES
KNEE_ACTIVITY_OF_DAILY_LIVING_SCORE: 27.14
HOW_WOULD_YOU_RATE_THE_CURRENT_FUNCTION_OF_YOUR_KNEE_DURING_YOUR_USUAL_DAILY_ACTIVITIES_ON_A_SCALE_FROM_0_TO_100_WITH_100_BEING_YOUR_LEVEL_OF_KNEE_FUNCTION_PRIOR_TO_YOUR_INJURY_AND_0_BEING_THE_INABILITY_TO_PERFORM_ANY_OF_YOUR_USUAL_DAILY_ACTIVITIES?: 20
RISE FROM A CHAIR: ACTIVITY IS VERY DIFFICULT
STAND: ACTIVITY IS SOMEWHAT DIFFICULT

## 2021-06-16 NOTE — PROGRESS NOTES
Knee  Brace Trial:     Pain Rating    Affected Joint: Right Knee    Without  brace:    At rest 7/10  Walkin/10    Squatting: 10/10   Stairs: 10/10      Wearing  brace:    At rest 7/10  Walkin/10    Squatting: 10/10   Stairs: 10/10      Brace and Measurements:    Brace trialed:     Type - [place]  One  Size - Large and XL due to poor fit  Side - Lateral  Affected Knee - Left Knee    Circumference 6 inches below mid patella: 18 inches    Other Comments:  No change in symptoms noted after trial of use of  one

## 2021-06-16 NOTE — PROGRESS NOTES
"Outpatient Physical Therapy Progress Note     Patient: Vannessa Molina  : 1975    Beginning/End Dates of Reporting Period:  21 to 2021    Referring Provider: Dr. Suarez    Therapy Diagnosis: Bilateral hip pain, Trochanteric bursitis of both hips, Somatic dysfunction of spine affecting pelvic region     Client Self Report:  Patient reports today for bilateral hip and knee discomfort. States that her hips and knees have been very uncomfortable the past few days. Has had to use a SPC for daily weight bearing and ambulation secondary to the pain. Back has improved, but hips and knees makes it difficult to do any weight bearing activities.    Objective Measurements:  Pain with passive hip mobility bilaterally - hip pinching in bilteral groins - more pain with passive IR and end range flexion    Outcome Measures (most recent score):  Knee Outcome ADL scale: 27.12    Goals:  Short-term goals:  To be achieved in 2-4 weeks:     Instruct in home program  1.) Patient will understand biomechanical stressors of the hip joint in order to make modifications to activities to reduce further risk of injury. MET  2.) Patient will be independent with a short-term home exercise program. MET  3.) Patient will be able to actively flex her R hip throughout its full range of motion without pain to allow for increased comfort with getting into bed and into cars. NOT MET       Long-term goals:  To be achieved in 8-10 weeks:     Return to previous level of function NOT MET  Self management of symptoms. NOT MET  Pain free activities of daily living. NOT MET  1.) Patient will increase hip abduction and extension strength to 4+/5 bilaterally NOT MET  2.) Patient will be able to negotiate a flight of stairs with reciprocal pattern independently. NOT MET  3.) Patient will report 75% improvement in overall symptoms NOT MET  4.) Patient will increase her self-reported score of \"Going up 1 flight of stairs\" and \"Going down 1 flight of " "stairs\" to \"Slight difficult\" or \"No difficulty at all\" to show return to PLOF with ambulating stairs. NOT MET    Assessment:  Patient presents to PT today with worsening hip and knee pain symptoms that are making it difficult to perform daily movements and work activities. Patient's low back pain has improved, but is not progressing with conservative care in regards to hip and knee discomfort. Patient would benefit from further consultation with ortho specialist regarding worsening hip and knee pain.      Plan:  Changes to therapy plan of care: Patient to meet with primary care physician regarding referral to orthopedist due to unchanged symptoms with PT care.      "

## 2021-06-16 NOTE — LETTER
6/16/21    To whom it may concern:    Please continue current restrictions  -light duty - no use of arms over shoulders, no bending/stooping, no climbing and no lifting - until her next appointment scheduled 6/23/21.   Please contact me for questions or concerns.        Sincerely,           MITZI Woodall MD

## 2021-06-16 NOTE — TELEPHONE ENCOUNTER
Yes.  Her therapist did message me today as well.  Please complete form to extend leave until appointment.

## 2021-06-16 NOTE — TELEPHONE ENCOUNTER
Contacted patient about form and she [facility]   Glo Pham LPN

## 2021-06-23 ENCOUNTER — ANCILLARY PROCEDURE (OUTPATIENT)
Dept: GENERAL RADIOLOGY | Facility: OTHER | Age: 46
End: 2021-06-23
Attending: FAMILY MEDICINE
Payer: COMMERCIAL

## 2021-06-23 ENCOUNTER — MYC MEDICAL ADVICE (OUTPATIENT)
Dept: FAMILY MEDICINE | Facility: OTHER | Age: 46
End: 2021-06-23

## 2021-06-23 ENCOUNTER — OFFICE VISIT (OUTPATIENT)
Dept: FAMILY MEDICINE | Facility: OTHER | Age: 46
End: 2021-06-23
Attending: FAMILY MEDICINE
Payer: COMMERCIAL

## 2021-06-23 VITALS
HEIGHT: 67 IN | DIASTOLIC BLOOD PRESSURE: 84 MMHG | OXYGEN SATURATION: 98 % | SYSTOLIC BLOOD PRESSURE: 112 MMHG | WEIGHT: 279 LBS | HEART RATE: 75 BPM | TEMPERATURE: 97.9 F | BODY MASS INDEX: 43.79 KG/M2

## 2021-06-23 DIAGNOSIS — M25.552 HIP PAIN, LEFT: ICD-10-CM

## 2021-06-23 DIAGNOSIS — R76.8 POSITIVE ANA (ANTINUCLEAR ANTIBODY): ICD-10-CM

## 2021-06-23 DIAGNOSIS — M25.561 CHRONIC PAIN OF RIGHT KNEE: ICD-10-CM

## 2021-06-23 DIAGNOSIS — M54.41 ACUTE BILATERAL LOW BACK PAIN WITH RIGHT-SIDED SCIATICA: ICD-10-CM

## 2021-06-23 DIAGNOSIS — M25.50 ARTHRALGIA, UNSPECIFIED JOINT: Primary | ICD-10-CM

## 2021-06-23 DIAGNOSIS — G89.29 CHRONIC PAIN OF RIGHT KNEE: ICD-10-CM

## 2021-06-23 LAB
ALBUMIN SERPL-MCNC: 3.7 G/DL (ref 3.4–5)
ALP SERPL-CCNC: 70 U/L (ref 40–150)
ALT SERPL W P-5'-P-CCNC: 18 U/L (ref 0–50)
ANION GAP SERPL CALCULATED.3IONS-SCNC: 2 MMOL/L (ref 3–14)
AST SERPL W P-5'-P-CCNC: 9 U/L (ref 0–45)
BILIRUB SERPL-MCNC: 0.4 MG/DL (ref 0.2–1.3)
BUN SERPL-MCNC: 11 MG/DL (ref 7–30)
CALCIUM SERPL-MCNC: 9.2 MG/DL (ref 8.5–10.1)
CHLORIDE SERPL-SCNC: 106 MMOL/L (ref 94–109)
CO2 SERPL-SCNC: 29 MMOL/L (ref 20–32)
CREAT SERPL-MCNC: 0.59 MG/DL (ref 0.52–1.04)
CRP SERPL-MCNC: 11.7 MG/L (ref 0–8)
ERYTHROCYTE [SEDIMENTATION RATE] IN BLOOD BY WESTERGREN METHOD: 17 MM/H (ref 0–20)
GFR SERPL CREATININE-BSD FRML MDRD: >90 ML/MIN/{1.73_M2}
GLUCOSE SERPL-MCNC: 88 MG/DL (ref 70–99)
POTASSIUM SERPL-SCNC: 3.9 MMOL/L (ref 3.4–5.3)
PROT SERPL-MCNC: 7.6 G/DL (ref 6.8–8.8)
SODIUM SERPL-SCNC: 137 MMOL/L (ref 133–144)

## 2021-06-23 PROCEDURE — 86039 ANTINUCLEAR ANTIBODIES (ANA): CPT | Performed by: FAMILY MEDICINE

## 2021-06-23 PROCEDURE — 99215 OFFICE O/P EST HI 40 MIN: CPT | Performed by: FAMILY MEDICINE

## 2021-06-23 PROCEDURE — 80053 COMPREHEN METABOLIC PANEL: CPT | Performed by: FAMILY MEDICINE

## 2021-06-23 PROCEDURE — 85652 RBC SED RATE AUTOMATED: CPT | Performed by: FAMILY MEDICINE

## 2021-06-23 PROCEDURE — 73562 X-RAY EXAM OF KNEE 3: CPT | Mod: TC | Performed by: RADIOLOGY

## 2021-06-23 PROCEDURE — 72100 X-RAY EXAM L-S SPINE 2/3 VWS: CPT | Mod: TC | Performed by: RADIOLOGY

## 2021-06-23 PROCEDURE — 36415 COLL VENOUS BLD VENIPUNCTURE: CPT | Performed by: FAMILY MEDICINE

## 2021-06-23 PROCEDURE — 86140 C-REACTIVE PROTEIN: CPT | Performed by: FAMILY MEDICINE

## 2021-06-23 PROCEDURE — 73502 X-RAY EXAM HIP UNI 2-3 VIEWS: CPT | Mod: TC | Performed by: RADIOLOGY

## 2021-06-23 PROCEDURE — 86038 ANTINUCLEAR ANTIBODIES: CPT | Performed by: FAMILY MEDICINE

## 2021-06-23 RX ORDER — GABAPENTIN 300 MG/1
300 CAPSULE ORAL 3 TIMES DAILY
Qty: 90 CAPSULE | Refills: 1 | Status: SHIPPED | OUTPATIENT
Start: 2021-06-23 | End: 2021-08-04

## 2021-06-23 RX ORDER — UREA 10 %
500 LOTION (ML) TOPICAL DAILY
COMMUNITY
End: 2021-08-04

## 2021-06-23 RX ORDER — VIT C/B6/B5/MAGNESIUM/HERB 173 50-5-6-5MG
CAPSULE ORAL
COMMUNITY
End: 2021-08-26

## 2021-06-23 ASSESSMENT — MIFFLIN-ST. JEOR: SCORE: 1948.17

## 2021-06-23 NOTE — PATIENT INSTRUCTIONS
Will call with lab and xray results.  Trial of Neurontin/Gabapentin 300 mg 3 times per day.   Referral to physiatry - comprehensive evaluation given diffuse joint involvement.  [place].  Consider additional imaging, injections, procedures per specialist.

## 2021-06-23 NOTE — NURSING NOTE
"Chief Complaint   Patient presents with     Follow Up     arthritis       Initial /84   Pulse 75   Temp 97.9  F (36.6  C)   Ht 1.702 m (5' 7\")   Wt 126.6 kg (279 lb)   SpO2 98%   BMI 43.70 kg/m   Estimated body mass index is 43.7 kg/m  as calculated from the following:    Height as of this encounter: 1.702 m (5' 7\").    Weight as of this encounter: 126.6 kg (279 lb).  Medication Reconciliation: complete  Karina James LPN    "

## 2021-06-24 LAB
ANA PAT SER IF-IMP: ABNORMAL
ANA SER QL IF: POSITIVE
ANA TITR SER IF: ABNORMAL {TITER}

## 2021-06-24 NOTE — TELEPHONE ENCOUNTER
Scoliosis just means curvature of the spine.  Honestly, likely the least of her concerns related to her symptoms.  She has degenerative changes in her spine at 1 level - L5/S1.    Proceed as planned.

## 2021-06-28 DIAGNOSIS — R76.8 POSITIVE ANA (ANTINUCLEAR ANTIBODY): ICD-10-CM

## 2021-06-28 DIAGNOSIS — M25.50 ARTHRALGIA, UNSPECIFIED JOINT: ICD-10-CM

## 2021-06-28 PROCEDURE — 36415 COLL VENOUS BLD VENIPUNCTURE: CPT | Performed by: FAMILY MEDICINE

## 2021-06-28 PROCEDURE — 86235 NUCLEAR ANTIGEN ANTIBODY: CPT | Mod: 59 | Performed by: FAMILY MEDICINE

## 2021-06-28 PROCEDURE — 86225 DNA ANTIBODY NATIVE: CPT | Performed by: FAMILY MEDICINE

## 2021-06-28 PROCEDURE — 86235 NUCLEAR ANTIGEN ANTIBODY: CPT | Performed by: FAMILY MEDICINE

## 2021-06-29 LAB
CENTROMERE IGG SER-ACNC: <0.2 AI (ref 0–0.9)
DSDNA AB SER-ACNC: 1 IU/ML
ENA RNP IGG SER IA-ACNC: <0.2 AI (ref 0–0.9)
ENA SM IGG SER-ACNC: <0.2 AI (ref 0–0.9)
ENA SS-A IGG SER IA-ACNC: <0.2 AI (ref 0–0.9)
ENA SS-B IGG SER IA-ACNC: <0.2 AI (ref 0–0.9)

## 2021-06-30 ENCOUNTER — MYC MEDICAL ADVICE (OUTPATIENT)
Dept: FAMILY MEDICINE | Facility: OTHER | Age: 46
End: 2021-06-30

## 2021-06-30 DIAGNOSIS — M25.50 ARTHRALGIA, UNSPECIFIED JOINT: Primary | ICD-10-CM

## 2021-06-30 DIAGNOSIS — R76.8 POSITIVE ANA (ANTINUCLEAR ANTIBODY): ICD-10-CM

## 2021-06-30 NOTE — TELEPHONE ENCOUNTER
The positive PHILIP is non specific. The other labs were done to see if we could identify other specifics of conditions such as lupus, sjogren's, etc.    I will place a rheumatology referral for the positive PHILIP, but sometimes they will not take it without abnormal findings.    In the mean time, plan for physiatry referral as placed last visit.

## 2021-06-30 NOTE — TELEPHONE ENCOUNTER
Upon notifying of results patient wondering since she does have a positive PHILIP - autoimmune screen but other labs were negative- did you still want to proceed with rheumatology referral or advise anything related to the positve marker.  Please advise.  Glo Pham LPN

## 2021-07-01 ENCOUNTER — TELEPHONE (OUTPATIENT)
Dept: FAMILY MEDICINE | Facility: OTHER | Age: 46
End: 2021-07-01

## 2021-07-01 ENCOUNTER — OFFICE VISIT (OUTPATIENT)
Dept: PSYCHOLOGY | Facility: OTHER | Age: 46
End: 2021-07-01
Attending: COUNSELOR
Payer: COMMERCIAL

## 2021-07-01 DIAGNOSIS — F41.1 GAD (GENERALIZED ANXIETY DISORDER): Primary | ICD-10-CM

## 2021-07-01 DIAGNOSIS — F33.9 EPISODE OF RECURRENT MAJOR DEPRESSIVE DISORDER, UNSPECIFIED DEPRESSION EPISODE SEVERITY (H): ICD-10-CM

## 2021-07-01 DIAGNOSIS — F50.811 BINGE-EATING DISORDER, MODERATE: ICD-10-CM

## 2021-07-01 PROCEDURE — 90837 PSYTX W PT 60 MINUTES: CPT | Performed by: COUNSELOR

## 2021-07-01 ASSESSMENT — ANXIETY QUESTIONNAIRES
6. BECOMING EASILY ANNOYED OR IRRITABLE: NOT AT ALL
3. WORRYING TOO MUCH ABOUT DIFFERENT THINGS: NOT AT ALL
GAD7 TOTAL SCORE: 1
1. FEELING NERVOUS, ANXIOUS, OR ON EDGE: NOT AT ALL
5. BEING SO RESTLESS THAT IT IS HARD TO SIT STILL: NOT AT ALL
IF YOU CHECKED OFF ANY PROBLEMS ON THIS QUESTIONNAIRE, HOW DIFFICULT HAVE THESE PROBLEMS MADE IT FOR YOU TO DO YOUR WORK, TAKE CARE OF THINGS AT HOME, OR GET ALONG WITH OTHER PEOPLE: NOT DIFFICULT AT ALL
2. NOT BEING ABLE TO STOP OR CONTROL WORRYING: NOT AT ALL
7. FEELING AFRAID AS IF SOMETHING AWFUL MIGHT HAPPEN: NOT AT ALL

## 2021-07-01 ASSESSMENT — PATIENT HEALTH QUESTIONNAIRE - PHQ9
5. POOR APPETITE OR OVEREATING: SEVERAL DAYS
SUM OF ALL RESPONSES TO PHQ QUESTIONS 1-9: 9

## 2021-07-01 NOTE — PROGRESS NOTES
The author of this note documented a reason for not sharing it with the patient.    Counseling Progress Note    Client Name: Vannessa Molina    Date of Service (when I saw the patient): 07/01/2021    Service Type: Individual Psychotherapy    Session Start Time:  9:00am  Session End Time: 10:00am  Session Length: I spent 53+ minutes performing psychotherapy during this office visit.  Session Number: 12    DSM5 Diagnoses: 300.02 (F41.1) Generalized Anxiety Disorder; 307.51 (F50.8) Binge-Eating Disorder, Severity: Moderate    Attendees: Client     Health Maintenance Topics with due status: Due Soon       Topic Date Due    NIRAJ ASSESSMENT 02/17/2021       Treatment Plan Due Date: 05/12/2021  Diagnostic Assessment Due Date: 2/2/2022    DATA    Treatment Objective(s) Addressed in This Session: Improve self-acceptance, focus on self - happiness, acceptance, feeling content, improve ability to feel / experience emotions, reduce self-defeating behaviors    Progress on / Status of Treatment Objective(s) / Homework: Vannessa reports ongoing struggles with pain, mobility, and seeking treatment answers. She expresses frustration in the waiting and lack of answers at this point. Her next appointment is not until near the end of August.  Vannessa reports feeling more down on herself recently. She expressed worry that her fiance will leave her because she needs to much support from him. Asked about her seeing a dietician again and the possibility that in conjunction with therapy this could be more successful.    Intervention: Provided active listening and open questions related to her current struggles with depression and negative self-image. Explored her difficulty with accepting support and help from others. Talked about the way she treats herself vs. other people. Suggested a daily gratitude journal. Vannessa did not seemed convinced this would help.Encouraged consistency with weekly appointments.    Current Stressors / Issues: Mental health  symptoms, physical pain    Medication Review: No new medications. Her PT put in a request for an antiinflammatory to help with her leg pain.    Medication Compliance: Takes medications as prescribed    Changes in Health: Hip, knee and lower back pain    Chemical Use Review:   Substance Use: No    Tobacco Use: No    ASSESSMENT: Current Emotional / Mental Status (status of significant symptoms):  Risk status no evidence of suicidal or homicidal ideation  Client denies current fears or concerns for personal safety.  A safety and risk management plan has not been developed at this time; however client was given the after-hours number should there be a change in any of these risk factors.    Appearance:                            Appropriate   Eye Contact:                           Good   Psychomotor Behavior:          Fidgety  Attitude:                                   Anxious  Orientation:                             All  Speech              Rate / Production:      Rapid speech, rambling              Volume:                       Normal   Mood:                                      Normal  Affect:                                      Flat  Thought Content:                    Clear   Thought Form:                        Tangentile  Insight:                                     Fair     Collateral Reports Completed: PHQ9 & GAD7      PLAN: Weekly therapy sessions. Continue to work on a behavior change plan assisting Vannessa to be able to loose weight and improve her health.    Pattie Bucio, [facility]

## 2021-07-01 NOTE — TELEPHONE ENCOUNTER
Fadia Hanks RN   6/23/2021  2:25 PM CDT      Patient called and results given.  Please refer her to radiology for injections and orthopedics for second opinion.    Glo Pham LPN   6/23/2021  2:15 PM CDT      Left message for pt to call back to inform her of the results/options noted . MITZI Woodall Amanda J, MD   6/23/2021 12:44 PM CDT      Call with results.  Lab and xray - labs notable for improved inflammatory markers - 1 normal, 1 mildly elevated.  xrays - knee with severe arthritis, hip with moderate, and back with some degenerative changes as well at L5/S1.     Physiatry referral in place (done at a visit today).     Since hip injection right was helpful - could schedule left with radiology if she would like.  Pend order if agreeable.     Consider second opinion with orthopedics - but would likely address 1 joint at a time.     [facility] - please push all imagines in past 2 years for physiatry consult as well, and send copies of labs and imaging from past 2 years with consult.        I apologize if I misunderstood or pended improper/un-needed order.  Glo Pham LPN

## 2021-07-01 NOTE — TELEPHONE ENCOUNTER
Please clarify?  My last note discussed physiatry referral, which was already in place, and rheumatology referral, which I placed yesterday.    Not sure why injections were pended?  Or ortho?

## 2021-07-01 NOTE — TELEPHONE ENCOUNTER
This is getting too confusing - multiple conversations in multiple areas - my chart message, telephone notes, result notes.    Ok - physiatry referral in place.  Rheumatology referral in place.  Holding on orthopedics at this time.  Too soon for right hip injection - has been less than 3 months.

## 2021-07-01 NOTE — TELEPHONE ENCOUNTER
Pt notified of results. radiology for injections and orthopedics for second opinion orders pending.   Glo Pham LPN

## 2021-07-02 ASSESSMENT — ANXIETY QUESTIONNAIRES: GAD7 TOTAL SCORE: 1

## 2021-07-06 ENCOUNTER — OFFICE VISIT (OUTPATIENT)
Dept: PSYCHOLOGY | Facility: OTHER | Age: 46
End: 2021-07-06
Attending: COUNSELOR
Payer: COMMERCIAL

## 2021-07-06 DIAGNOSIS — F41.1 GAD (GENERALIZED ANXIETY DISORDER): Primary | ICD-10-CM

## 2021-07-06 DIAGNOSIS — F50.811 BINGE-EATING DISORDER, MODERATE: ICD-10-CM

## 2021-07-06 DIAGNOSIS — F33.1 MODERATE EPISODE OF RECURRENT MAJOR DEPRESSIVE DISORDER (H): ICD-10-CM

## 2021-07-06 PROCEDURE — 90837 PSYTX W PT 60 MINUTES: CPT | Performed by: COUNSELOR

## 2021-07-06 ASSESSMENT — ANXIETY QUESTIONNAIRES
1. FEELING NERVOUS, ANXIOUS, OR ON EDGE: NOT AT ALL
3. WORRYING TOO MUCH ABOUT DIFFERENT THINGS: SEVERAL DAYS
6. BECOMING EASILY ANNOYED OR IRRITABLE: SEVERAL DAYS
IF YOU CHECKED OFF ANY PROBLEMS ON THIS QUESTIONNAIRE, HOW DIFFICULT HAVE THESE PROBLEMS MADE IT FOR YOU TO DO YOUR WORK, TAKE CARE OF THINGS AT HOME, OR GET ALONG WITH OTHER PEOPLE: NOT DIFFICULT AT ALL
7. FEELING AFRAID AS IF SOMETHING AWFUL MIGHT HAPPEN: SEVERAL DAYS
2. NOT BEING ABLE TO STOP OR CONTROL WORRYING: SEVERAL DAYS
GAD7 TOTAL SCORE: 4
5. BEING SO RESTLESS THAT IT IS HARD TO SIT STILL: NOT AT ALL

## 2021-07-06 ASSESSMENT — PATIENT HEALTH QUESTIONNAIRE - PHQ9
SUM OF ALL RESPONSES TO PHQ QUESTIONS 1-9: 11
5. POOR APPETITE OR OVEREATING: NOT AT ALL

## 2021-07-06 NOTE — PROGRESS NOTES
The author of this note documented a reason for not sharing it with the patient.    Counseling Progress Note    Client Name: Vannessa Molina    Date of Service (when I saw the patient): 07/06/2021    Service Type: Individual Psychotherapy    Session Start Time:  2:30 pm  Session End Time: 3:30 pm  Session Length: I spent 53+ minutes performing psychotherapy during this office visit.  Session Number: 13    DSM5 Diagnoses: 300.02 (F41.1) Generalized Anxiety Disorder; 307.51 (F50.8) Binge-Eating Disorder, Severity: Moderate    Attendees: Client     Health Maintenance Topics with due status: Due Soon       Topic Date Due    NIRAJ ASSESSMENT 02/17/2021       Treatment Plan Due Date: 05/12/2021  Diagnostic Assessment Due Date: 2/2/2022    DATA    Treatment Objective(s) Addressed in This Session: Improve self-acceptance, focus on self - happiness, acceptance, feeling content, improve ability to feel / experience emotions, reduce self-defeating behaviors    Progress on / Status of Treatment Objective(s) / Homework: Vannessa shared about her birthday celebration and feeling good about having time with her family both immediate and extended. She reports reminding herself to think of the positive (instead of gratitude) working on being less hard on herself. She also describes self-exploration and working on exploring who she is. Vannessa plans to work on discovering a hobby and also continuing to work on developing more positive thinking patterns - especially related to herself.    Intervention: Active listening and open questions. Used a person centered supportive approach to continue to build her trust with this therapist. Worked on feeling exploration and recognizing when she is hiding or avoiding feelings. Discussed her work between sessions on learning about herself - including exploring what she might like to do for a hobby.     Current Stressors / Issues: Mental health symptoms, physical pain    Medication Review: No new  medications. Her PT put in a request for an antiinflammatory to help with her leg pain.    Medication Compliance: Takes medications as prescribed    Changes in Health: Hip, knee and lower back pain    Chemical Use Review:   Substance Use: No    Tobacco Use: No    ASSESSMENT: Current Emotional / Mental Status (status of significant symptoms):  Risk status no evidence of suicidal or homicidal ideation  Client denies current fears or concerns for personal safety.  A safety and risk management plan has not been developed at this time; however client was given the after-hours number should there be a change in any of these risk factors.    Appearance:                            Appropriate   Eye Contact:                           Good   Psychomotor Behavior:          Fidgety  Attitude:                                   Anxious  Orientation:                             All  Speech              Rate / Production:      Rapid speech, rambling              Volume:                       Normal   Mood:                                      Normal  Affect:                                      Flat  Thought Content:                    Clear   Thought Form:                        Tangentile  Insight:                                     Fair     Collateral Reports Completed: PHQ9 & GAD7      PLAN: Weekly therapy sessions. Continue working on treatment plan goals. Vannessa set goals for herself to work on over the next two weeks - they are noted above.     Pattie Bucio, [facility]

## 2021-07-07 ENCOUNTER — MYC MEDICAL ADVICE (OUTPATIENT)
Dept: FAMILY MEDICINE | Facility: OTHER | Age: 46
End: 2021-07-07

## 2021-07-07 DIAGNOSIS — M25.50 ARTHRALGIA, UNSPECIFIED JOINT: Primary | ICD-10-CM

## 2021-07-07 ASSESSMENT — ANXIETY QUESTIONNAIRES: GAD7 TOTAL SCORE: 4

## 2021-07-08 RX ORDER — GABAPENTIN 600 MG/1
600 TABLET ORAL 3 TIMES DAILY
Qty: 90 TABLET | Refills: 1 | Status: SHIPPED | OUTPATIENT
Start: 2021-07-08 | End: 2021-09-09

## 2021-07-08 NOTE — TELEPHONE ENCOUNTER
Spoke with pt and Pt will stay with the referral she has rather than switching/waiting longer for [place].  New med dose order pending.  Please advise. .  Glo Pham LPN

## 2021-07-08 NOTE — TELEPHONE ENCOUNTER
Referral in [place] for rheumatology is likely booking quite a it further out.  Does she still want to change it?    Also, she is on starting dose of Gabapentin 300 mg TID.  Can increase to 600 mg TID.    Pend new script if needed.

## 2021-07-12 ENCOUNTER — ANCILLARY PROCEDURE (OUTPATIENT)
Dept: MAMMOGRAPHY | Facility: OTHER | Age: 46
End: 2021-07-12
Attending: FAMILY MEDICINE
Payer: COMMERCIAL

## 2021-07-12 DIAGNOSIS — Z12.31 VISIT FOR SCREENING MAMMOGRAM: ICD-10-CM

## 2021-07-12 PROCEDURE — 77063 BREAST TOMOSYNTHESIS BI: CPT | Mod: TC | Performed by: RADIOLOGY

## 2021-07-12 PROCEDURE — 77067 SCR MAMMO BI INCL CAD: CPT | Mod: TC | Performed by: RADIOLOGY

## 2021-07-13 ENCOUNTER — TELEPHONE (OUTPATIENT)
Dept: MAMMOGRAPHY | Facility: OTHER | Age: 46
End: 2021-07-13

## 2021-07-22 ENCOUNTER — OFFICE VISIT (OUTPATIENT)
Dept: PSYCHOLOGY | Facility: OTHER | Age: 46
End: 2021-07-22
Attending: COUNSELOR
Payer: COMMERCIAL

## 2021-07-22 DIAGNOSIS — F41.1 GAD (GENERALIZED ANXIETY DISORDER): Primary | ICD-10-CM

## 2021-07-22 DIAGNOSIS — F33.1 MODERATE EPISODE OF RECURRENT MAJOR DEPRESSIVE DISORDER (H): ICD-10-CM

## 2021-07-22 DIAGNOSIS — F50.811 BINGE-EATING DISORDER, MODERATE: ICD-10-CM

## 2021-07-22 PROCEDURE — 90837 PSYTX W PT 60 MINUTES: CPT | Performed by: COUNSELOR

## 2021-07-23 NOTE — PROGRESS NOTES
"The author of this note documented a reason for not sharing it with the patient.    Counseling Progress Note    Client Name: Vannessa Molina    Date of Service (when I saw the patient): 07/22/2021    Service Type: Individual Psychotherapy    Session Start Time:  2:00 pm  Session End Time: 3:00 pm  Session Length: I spent 53+ minutes performing psychotherapy during this office visit.  Session Number: 13    DSM5 Diagnoses: 300.02 (F41.1) Generalized Anxiety Disorder; 307.51 (F50.8) Binge-Eating Disorder, Severity: Moderate    Attendees: Client     Health Maintenance Topics with due status: Due Soon       Topic Date Due    NIRAJ ASSESSMENT 02/17/2021       Treatment Plan Due Date: 05/12/2021  Diagnostic Assessment Due Date: 2/2/2022    DATA    Treatment Objective(s) Addressed in This Session: Improve self-acceptance, focus on self - happiness, acceptance, feeling content, improve ability to feel / experience emotions, reduce self-defeating behaviors    Progress on / Status of Treatment Objective(s) / Homework: Vannessa shared about her partner/significant other/boyfriend demonstrating clear intentions to follow-through on getting  May of next year. She describes this as being a weight lifted off of her. She also made a connection to feeling abandoned -  Spent time processing / exploring these feelings. Vannessa is still demonstrating difficulty identifying / expressing her emotions. She seems to be building an awareness of this. Vannessa plans to talk with Sean about her feelings / realization.     Intervention: Active listening and open questions. Made attempts to connect to her emotions and identify what she was feeling. Vannessa was unable to name an emotion and described this as feeling \"numb\". Explored the connection between her binge eating and numbing her feelings. Talked about her thoughts about getting  and how this helps her to feel more secure in her relationship with her significant other.     Current Stressors / " Issues: Mental health symptoms, physical pain    Medication Review: No new medications.     Medication Compliance: Takes medications as prescribed    Changes in Health: Hip, knee and lower back pain. Positive test for an autoimmune disease - waiting to see a Rheumatologist.     Chemical Use Review:   Substance Use: No    Tobacco Use: No    ASSESSMENT: Current Emotional / Mental Status (status of significant symptoms):  Risk status no evidence of suicidal or homicidal ideation  Client denies current fears or concerns for personal safety.  A safety and risk management plan has not been developed at this time; however client was given the after-hours number should there be a change in any of these risk factors.    Appearance:                            Appropriate   Eye Contact:                           Good   Psychomotor Behavior:          Fidgety  Attitude:                                   Anxious  Orientation:                             All  Speech              Rate / Production:      Rapid speech, rambling              Volume:                       Normal   Mood:                                      Normal  Affect:                                      Flat  Thought Content:                    Clear   Thought Form:                        Tangentile  Insight:                                     Fair     Collateral Reports Completed: PHQ9 & GAD7      PLAN: Weekly therapy sessions. Continue working on treatment plan goals. Check-in on her exploring hobbies of interest and also if she was able to talk with her significant other.    Pattie Bucio [facility]

## 2021-07-25 ENCOUNTER — MYC MEDICAL ADVICE (OUTPATIENT)
Dept: FAMILY MEDICINE | Facility: OTHER | Age: 46
End: 2021-07-25

## 2021-08-02 ENCOUNTER — APPOINTMENT (OUTPATIENT)
Dept: MRI IMAGING | Facility: HOSPITAL | Age: 46
End: 2021-08-02
Attending: PHYSICIAN ASSISTANT
Payer: COMMERCIAL

## 2021-08-02 ENCOUNTER — HOSPITAL ENCOUNTER (EMERGENCY)
Facility: HOSPITAL | Age: 46
Discharge: HOME | End: 2021-08-02
Attending: PHYSICIAN ASSISTANT | Admitting: PHYSICIAN ASSISTANT
Payer: COMMERCIAL

## 2021-08-02 VITALS
DIASTOLIC BLOOD PRESSURE: 84 MMHG | RESPIRATION RATE: 16 BRPM | OXYGEN SATURATION: 98 % | SYSTOLIC BLOOD PRESSURE: 128 MMHG | HEART RATE: 66 BPM | TEMPERATURE: 97.8 F

## 2021-08-02 DIAGNOSIS — G89.29 CHRONIC BILATERAL LOW BACK PAIN WITH BILATERAL SCIATICA: ICD-10-CM

## 2021-08-02 DIAGNOSIS — M54.42 CHRONIC BILATERAL LOW BACK PAIN WITH BILATERAL SCIATICA: ICD-10-CM

## 2021-08-02 DIAGNOSIS — M43.17 SPONDYLOLISTHESIS AT L5-S1 LEVEL: ICD-10-CM

## 2021-08-02 DIAGNOSIS — M54.41 CHRONIC BILATERAL LOW BACK PAIN WITH BILATERAL SCIATICA: ICD-10-CM

## 2021-08-02 PROCEDURE — 250N000011 HC RX IP 250 OP 636: Performed by: PHYSICIAN ASSISTANT

## 2021-08-02 PROCEDURE — 72148 MRI LUMBAR SPINE W/O DYE: CPT

## 2021-08-02 PROCEDURE — 99284 EMERGENCY DEPT VISIT MOD MDM: CPT | Mod: 25

## 2021-08-02 PROCEDURE — 99284 EMERGENCY DEPT VISIT MOD MDM: CPT | Performed by: PHYSICIAN ASSISTANT

## 2021-08-02 PROCEDURE — 96372 THER/PROPH/DIAG INJ SC/IM: CPT | Performed by: PHYSICIAN ASSISTANT

## 2021-08-02 RX ORDER — HYDROCODONE BITARTRATE AND ACETAMINOPHEN 5; 325 MG/1; MG/1
1 TABLET ORAL EVERY 6 HOURS PRN
Qty: 12 TABLET | Refills: 0 | Status: SHIPPED | OUTPATIENT
Start: 2021-08-02 | End: 2021-08-05

## 2021-08-02 RX ORDER — COVID-19 ANTIGEN TEST
440 KIT MISCELLANEOUS DAILY PRN
COMMUNITY
End: 2021-09-09

## 2021-08-02 RX ADMIN — HYDROMORPHONE HYDROCHLORIDE 1 MG: 1 INJECTION, SOLUTION INTRAMUSCULAR; INTRAVENOUS; SUBCUTANEOUS at 15:11

## 2021-08-02 ASSESSMENT — ENCOUNTER SYMPTOMS
FATIGUE: 0
NAUSEA: 0
NECK STIFFNESS: 0
DIZZINESS: 0
FEVER: 0
NECK PAIN: 0
LIGHT-HEADEDNESS: 0
WEAKNESS: 0
BACK PAIN: 1
VOMITING: 0
NUMBNESS: 1
BRUISES/BLEEDS EASILY: 0
ACTIVITY CHANGE: 1
CHEST TIGHTNESS: 0
COUGH: 0
ABDOMINAL PAIN: 0
APPETITE CHANGE: 0
SHORTNESS OF BREATH: 0
CHILLS: 0

## 2021-08-02 NOTE — ED PROVIDER NOTES
History     Chief Complaint   Patient presents with     Back Pain     HX of slipped discs and PT for weakness.      Tingling     BLE     The history is provided by the patient.     Vannessa Molina is a 46 year old female who presented to the emergency department via wheelchair along with  for evaluation of low back pain, bilateral hip pain, and bilateral left greater than right leg pain, paresthesias.  Patient tells me that she has been having mild symptoms that are identical for the last approximately 7 months.  She has had multiple images but no MRI.  She is also had steroid injections.  Denies any fevers.  Denies any history immunosuppression.  Denies any bowel or bladder dysfunction.  Denies any recent falls or trauma.  She has no history of VTE.    Allergies:  Allergies   Allergen Reactions     Iodine Other (See Comments) and Rash     (shrimp) lips swollen       Problem List:    Patient Active Problem List    Diagnosis Date Noted     Menorrhagia with regular cycle--US normal--PROVERA daily---10/2019 10/23/2019     Priority: Medium     Morbid obesity (H) 10/10/2019     Priority: Medium     Insulin resistance syndrome 09/20/2018     Priority: Medium     Benign essential hypertension 11/30/2015     Priority: Medium     Vitamin D deficiency 11/30/2015     Priority: Medium     Obesity 07/14/2011     Priority: Medium     Problem list name updated by automated process. Provider to review       Major depressive disorder, recurrent episode (H) 07/14/2011     Priority: Medium     Problem list name updated by automated process. Provider to review       GERD (gastroesophageal reflux diseae) 07/14/2011     Priority: Medium        Past Medical History:    Past Medical History:   Diagnosis Date     Benign essential hypertension 11/30/2015     GERD (gastroesophageal reflux diseae) 7/14/2011     Major depressive affective disorder, recurrent episode, unspecified 7/14/2011     Obesity  7/14/2011     Vitamin D deficiency  2015       Past Surgical History:    Past Surgical History:   Procedure Laterality Date     bilateral tubal ligation        section  ,         oligohydramnios       Tonsillectomy  2005       Family History:    Family History   Problem Relation Age of Onset     Cancer Mother         Cervical     Depression Father      Diabetes Father      Hypertension Father      Other - See Comments Father         Cholecystectomy/Rheumatoid arthritis     Heart Disease Father      LUNG DISEASE Father      Liver Disease Father      Other - See Comments Sister         Endometriosis     Fibromyalgia Other         mom and sister     Asthma No family hx of      Thyroid Disease No family hx of      Coronary Artery Disease No family hx of        Social History:  Marital Status:  Single [1]  Social History     Tobacco Use     Smoking status: Former Smoker     Types: Cigarettes     Quit date: 2004     Years since quittin.6     Smokeless tobacco: Never Used     Tobacco comment: Tried to quit   Substance Use Topics     Alcohol use: Not Currently     Alcohol/week: 0.0 standard drinks     Comment: hasnt drank in a year     Drug use: No        Medications:    cholecalciferol (VITAMIN D3) 5000 units (125 mcg) capsule  gabapentin (NEURONTIN) 600 MG tablet  HYDROcodone-acetaminophen (NORCO) 5-325 MG tablet  losartan (COZAAR) 25 MG tablet  medroxyPROGESTERone (PROVERA) 10 MG tablet  metFORMIN (GLUCOPHAGE-XR) 500 MG 24 hr tablet  naproxen sodium 220 MG capsule  sertraline (ZOLOFT) 100 MG tablet  cyanocobalamin (VITAMIN B-12) 500 MCG tablet  Docusate Sodium (GENTLE STOOL SOFTENER PO)  ferrous sulfate (FEROSUL) 325 (65 Fe) MG tablet  Flaxseed, Linseed, (FLAX SEED OIL PO)  gabapentin (NEURONTIN) 300 MG capsule  Melatonin 10 MG TABS tablet  Turmeric 500 MG CAPS          Review of Systems   Constitutional: Positive for activity change. Negative for appetite change, chills, fatigue and fever.   Respiratory: Negative for  cough, chest tightness and shortness of breath.    Cardiovascular: Negative for chest pain.   Gastrointestinal: Negative for abdominal pain, nausea and vomiting.   Genitourinary: Negative.    Musculoskeletal: Positive for back pain. Negative for neck pain and neck stiffness.        See HPI   Skin: Negative.    Neurological: Positive for numbness. Negative for dizziness, weakness and light-headedness.   Hematological: Does not bruise/bleed easily.       Physical Exam   BP: 133/92  Pulse: 87  Temp: 99.4  F (37.4  C)  Resp: 26  SpO2: 100 %      Physical Exam  Vitals and nursing note reviewed.   Constitutional:       General: She is not in acute distress.     Appearance: Normal appearance. She is not ill-appearing, toxic-appearing or diaphoretic.      Comments: 46-year-old female found seated upright on the edge of the exam bed in some mild distress from pain.   Pulmonary:      Effort: Pulmonary effort is normal.   Musculoskeletal:      Right lower leg: No edema.      Left lower leg: No edema.   Skin:     General: Skin is warm.      Capillary Refill: Capillary refill takes less than 2 seconds.   Neurological:      General: No focal deficit present.      Mental Status: She is alert and oriented to person, place, and time.   Psychiatric:         Mood and Affect: Mood normal.         ED Course        Procedures              Critical Care time:  none               Results for orders placed or performed during the hospital encounter of 08/02/21 (from the past 24 hour(s))   Lumbar spine MRI w/o contrast    Narrative    PROCEDURE: MR LUMBAR SPINE W/O CONTRAST, 8/2/2021 3:20 PM     HISTORY:Female, age 46 years, Low back pain, > 6 wks; severe worsening  low back pain with bilateral radiculopathy >7 months    COMPARISON: Lumbar spine x-rays 6/23/2021    TECHNIQUE: Sagittal T1, proton density, T2 with fat saturation; axial  proton density and T2.    FINDINGS:  Lumbar spine demonstrates normal lordotic curvature.    The  intervertebral disks at T11-12, T12-L1, L1-2, L2-3 and L3-4 are  normal in height and hydration. The peripheral margins of the disc at  each of these levels is normal. There is no evidence of foraminal or  central canal stenosis at these levels.    L4-5: Normal disc height. Minimal disc degeneration. Minimal endplate  and facet joint degeneration.    L5-S1: Moderate disc degeneration with slight bulge. Spondylolytic  spondylolisthesis of L5 relative to S1 with uncovering of the disc.  The neural foramina are narrowed with bilateral L5 ganglionic  compression, worse on the left.    SI joints: Congruent and appear normal.    Paraspinous muscles are normal.    Evaluation of the retroperitoneal soft tissues demonstrate a 2 cm cyst  in the right ovary. No evidence of retroperitoneal lymphadenopathy.      Impression    IMPRESSION:   Grade 1 spondylolytic spondylolisthesis at the lumbosacral junction  with bilateral foraminal narrowing and bilateral L5 ganglionic  impingement, worse on the left.    ITALO SALCEDO MD         SYSTEM ID:  [id number]       Medications   HYDROmorphone (DILAUDID) injection 1 mg (1 mg Intramuscular Given 8/2/21 1511)       Assessments & Plan (with Medical Decision Making)   Findings as above.  Findings would certainly fit her clinical presentation as well as subjective symptoms.  Pain medications and allowed to rest in the emergency department.  Pain improved.  No red flags or high risk features noted on MRI.  Small amount of pain medications for home.  Follow-up with primary care.  Return here for any worsening symptoms, new symptoms, or other concerns.    This document was prepared using a combination of typing and voice generated software.  While every attempt was made for accuracy, spelling and grammatical errors may exist.    I have reviewed the nursing notes.    I have reviewed the findings, diagnosis, plan and need for follow up with the patient.       New Prescriptions     HYDROCODONE-ACETAMINOPHEN (NORCO) 5-325 MG TABLET    Take 1 tablet by mouth every 6 hours as needed       Final diagnoses:   Chronic bilateral low back pain with bilateral sciatica   Spondylolisthesis at L5-S1 level       8/2/2021   [facility]     Fabrice Martinez PA-C  08/02/21 [id number]

## 2021-08-02 NOTE — ED NOTES
Patient brought to ED room in w/c. Patient reports getting out of car today and feeling burning/pins and needles like sensation down left leg. Patient reports pain 10/10 in left lower back, hip and knee. Patient unable to lift leg due to pain. Patient has good CMS down leg. Patient able to feel sensation and touch.

## 2021-08-02 NOTE — DISCHARGE INSTRUCTIONS
Pain medication sparingly.    Rest and stay hydrated.    Follow-up in the clinic for recheck.    Return here for any new symptoms, worsening symptoms, or other concerns.

## 2021-08-02 NOTE — LETTER
Vannessa Molina was seen and treated in our emergency department on 8/2/2021.  Please excuse her from work 8/2-8/4 due to a medical condition.     If you have any questions or concerns, please don't hesitate to call.              Fabrice Martinez PA-C

## 2021-08-02 NOTE — ED NOTES
Discharge instructions reviewed with patient and S.O  Rx has been e-scribed to pharmacy of choice.  Pt S.O at bedside to help pt get dressed as she is still a little drowsy from the pain medication.   No questions or concerns.  Encouraged to return to PCP or here with new or worsening symptoms.  Copy of AVS in hand on discharge.  To vehicle via

## 2021-08-02 NOTE — ED TRIAGE NOTES
Started this AM after work: pt has been increased pain to 10/10 in LT hip and low back. Has tingling down both legs,   weakness. LT> RT

## 2021-08-02 NOTE — ED NOTES
Attempted to place IV. Unsuccessful first attempt. Patient had MRI available immediately. Patient was changed into paper scrub pants and taken down to MRI

## 2021-08-03 ENCOUNTER — MYC MEDICAL ADVICE (OUTPATIENT)
Dept: FAMILY MEDICINE | Facility: OTHER | Age: 46
End: 2021-08-03

## 2021-08-03 NOTE — PROGRESS NOTES
Vannessa is a 46 year old who is being evaluated via a billable video visit.      How would you like to obtain your AVS? MyChart  If the video visit is dropped, the invitation should be resent by: Text to cell phone: [phone]  Will anyone else be joining your video visit? No      Video Start Time: 8:50  Video-Visit Details    Type of service:  Video Visit    Video End Time:9:20    Originating Location (pt. Location): Home    Distant Location (provider location):  [facility]     Platform used for Video Visit: [employer]

## 2021-08-04 NOTE — TELEPHONE ENCOUNTER
I called and left message for patient, okay by provider to use same day on Friday August 6th at 1:30pm. Waiting for a call back.

## 2021-08-04 NOTE — TELEPHONE ENCOUNTER
No openings, a couple same days available this Friday. Okay to use for ER follow up or would you like her to be seen sooner?

## 2021-08-05 ENCOUNTER — OFFICE VISIT (OUTPATIENT)
Dept: PSYCHOLOGY | Facility: OTHER | Age: 46
End: 2021-08-05
Attending: COUNSELOR
Payer: COMMERCIAL

## 2021-08-05 DIAGNOSIS — F50.811 BINGE-EATING DISORDER, MODERATE: Primary | ICD-10-CM

## 2021-08-05 PROCEDURE — 90837 PSYTX W PT 60 MINUTES: CPT | Performed by: COUNSELOR

## 2021-08-05 ASSESSMENT — ANXIETY QUESTIONNAIRES
1. FEELING NERVOUS, ANXIOUS, OR ON EDGE: NOT AT ALL
5. BEING SO RESTLESS THAT IT IS HARD TO SIT STILL: NOT AT ALL
2. NOT BEING ABLE TO STOP OR CONTROL WORRYING: NOT AT ALL
6. BECOMING EASILY ANNOYED OR IRRITABLE: SEVERAL DAYS
3. WORRYING TOO MUCH ABOUT DIFFERENT THINGS: NOT AT ALL
IF YOU CHECKED OFF ANY PROBLEMS ON THIS QUESTIONNAIRE, HOW DIFFICULT HAVE THESE PROBLEMS MADE IT FOR YOU TO DO YOUR WORK, TAKE CARE OF THINGS AT HOME, OR GET ALONG WITH OTHER PEOPLE: NOT DIFFICULT AT ALL
GAD7 TOTAL SCORE: 2
7. FEELING AFRAID AS IF SOMETHING AWFUL MIGHT HAPPEN: SEVERAL DAYS

## 2021-08-05 ASSESSMENT — PATIENT HEALTH QUESTIONNAIRE - PHQ9
5. POOR APPETITE OR OVEREATING: NOT AT ALL
SUM OF ALL RESPONSES TO PHQ QUESTIONS 1-9: 10

## 2021-08-05 NOTE — PROGRESS NOTES
The author of this note documented a reason for not sharing it with the patient.    Counseling Progress Note    Client Name: Vannessa Molina    Date of Service (when I saw the patient): 08/05/2021    Service Type: Individual Psychotherapy    Session Start Time:  2:05 pm  Session End Time: 3:00 pm  Session Length: I spent 53+ minutes performing psychotherapy during this office visit.  Session Number: 14    DSM5 Diagnoses: 300.02 (F41.1) Generalized Anxiety Disorder; 307.51 (F50.8) Binge-Eating Disorder, Severity: Moderate    Attendees: Client     Health Maintenance Topics with due status: Due Soon       Topic Date Due    NIRAJ ASSESSMENT 02/17/2021       Treatment Plan Due Date: 05/12/2021  Diagnostic Assessment Due Date: 2/2/2022    DATA    Treatment Objective(s) Addressed in This Session: Improve self-acceptance, focus on self - happiness, acceptance, feeling content, improve ability to feel / experience emotions, reduce self-defeating behaviors    Progress on / Status of Treatment Objective(s) / Homework: Vannessa was using a cane today. She reports having to go to the ER on Monday due to extreme shooting pain in her back and down the whole left side of her body. She was told she had a bulging disc, given pain relievers and told to rest for three full days. Vannessa expressed frustration with the process of trying to resolve joint pain. She sees a rheumatologist on 8/11, which she is happy about but this will not give her answers about her back pain. She also learned that she has a cyst on her ovary but does not know anything more and was told to follow-up with her PCP. Vannessa has a call into her PCP and is waiting to hear back. Vannessa did talk with Sean and said that conversation went better than she expected.     Intervention: Active listening, validation, reflection and open questions. Discussed her conversation with her fiance and how this went more smoothly that she thought it might. Processed her statement about how eating  and food seem to control her life. Brain stormed ways she may be able to take control of this. Vannessa shared set goals for weight loss and refraining from binge eating behavior.    Current Stressors / Issues: Mental health symptoms, physical pain, eating/diet    Medication Review: No new medications.     Medication Compliance: Takes medications as prescribed    Changes in Health: Hip, knee and lower back pain. Positive test for an autoimmune disease - waiting to see a Rheumatologist.     Chemical Use Review:   Substance Use: No    Tobacco Use: No    ASSESSMENT: Current Emotional / Mental Status (status of significant symptoms):  Risk status no evidence of suicidal or homicidal ideation  Client denies current fears or concerns for personal safety.  A safety and risk management plan has not been developed at this time; however client was given the after-hours number should there be a change in any of these risk factors.    Appearance:                            Appropriate   Eye Contact:                           Good   Psychomotor Behavior:          Fidgety  Attitude:                                   Anxious  Orientation:                             All  Speech              Rate / Production:      Rapid speech, rambling              Volume:                       Normal   Mood:                                      Normal  Affect:                                      Flat  Thought Content:                    Clear   Thought Form:                        Tangentile  Insight:                                     Fair     Collateral Reports Completed: PHQ9 & GAD7      PLAN: Weekly therapy sessions. Vannessa identified loosing a few pounds as her goal between sessions. She is also working on not having any binge eating between now and next session. She currently has two days of no binge eating. Vannessa agreed to track it, if she does have any instance of binge eating.     Pattie Bucio, [facility]

## 2021-08-06 ASSESSMENT — ANXIETY QUESTIONNAIRES: GAD7 TOTAL SCORE: 2

## 2021-08-06 NOTE — PROGRESS NOTES
"RHEUMATOLOGY INITIAL VIDEO CONSULT NOTE    Chief Complaint:    Chief Complaint   Patient presents with     Consult     joint pain and positive PHILIP, labs and xrays completed 6/23/2021-need care everywhere updated     Joint Pain       Reason for consult: Dr. Aaliyah Suarez from  has requested consultation for this patient for polyarthralgia and +PHILIP    Video visit may not be as thorough as an in-person visit and physical exam limitations may pose a challenge in formulating an accurate diagnosis.       History of Present Illness:    Vannessa Molina is a 46 year old female with pmhx depression, HTN, GERD, R knee and b/l hip OA, is referred to rheumatology clinic for polyarthralgia and +PHILIP 1:160. She reports having pain over her R hip few months ago and received a corticosteroid injection for R trochanteric bursitis. She did not find significant response and had another intraarticular injection over R hip, this time in the joint. She found relief with that injection. She also had R knee intraarticular corticosteroid injection. She reports she has been experiencing symptoms moreso on the L side, they bother her most when standing up from a sitting position and walking. She gets pain into the groin when trying to get in the car, get out of the car, or with climbing stairs. She denies pain in AM. Her pain is worst at night. She denies having any arthrocentesis in the past. She takes aleve and gabapentin for her pain. They do not help her. She underwent PT for her knees and hips and was told her pain is from her back. Her physical therapist said \"there was too much going on\" so she was asked to follow-up with her providers. She said she could not undergo PT due to numbness over her legs. She gets numbness radiating from her back down the entire leg (initially started on R side and now is more on the L side). Gabapentin did help with her radiculopathy symptoms with resolution of tingling. She reports occasional knee " swelling, which resolves after icing or resting. No swelling in AM, swelling is mostly noticeable in the evenings. She used to play volleyball, softball, and basketball when younger. She has always had jobs which required standing for hours -owned restaurant, worked in bar, runs group homes. Denies any known trauma.     Denies fevers, chills, weight loss, night sweats, vision changes, eye pain/redness, dry eyes, +dry mouth, denies oral/nasal ulcers, hair loss/thinning, rash, raynaud's, cough, SOB, pleurisy, chest pain, heartburn, difficulty swallowing, abdominal pain, diarrhea, hematochezia, melena, dysuria, +back pain, denies enthesitis, dactylitis. She occasionally feels like her hips and knees give out. +numbness over b/l LEs radiating from the back. No hx of blood clots. Denies hx of miscarriages.    Pertinent labs and imaging: (per chart review in [facility] and care everywhere)    Labs:   2021: +PHILIP 1:160 speckled, negative dsDNA, SSA, SSB, Sm, RNP, centromere  3/25/21: negative lyme, TSH, RF, CCP      Past Medical History:    Past Medical History:   Diagnosis Date     Benign essential hypertension 2015     GERD (gastroesophageal reflux diseae) 2011     Major depressive affective disorder, recurrent episode, unspecified 2011     Obesity  2011     Vitamin D deficiency 2015     Past Surgical History:   Past Surgical History:   Procedure Laterality Date     bilateral tubal ligation        section  ,         oligohydramnios       Tonsillectomy       Family History:   Denies family hx of SLE, Sjogren's syndrome, scleroderma, PsA, ankylosing spondylitis, psoriasis, vasculitis, gout, pseudogout    Father: RA    Social History:   Alcohol use: none, quit 2 years ago  Tobacco use: none, quit 16 years ago  Occupational hx: manages group homes    Allergies   Allergen Reactions     Iodine Other (See Comments) and Rash     (shrimp) lips swollen      Immunization History    Administered Date(s) Administered     COVID-19,PF,Pfizer 01/25/2021, 02/18/2021     DTaP, Unspecified 01/14/2016     Influenza Vaccine IM > 6 months Valent IIV4 10/26/2018, 01/13/2021     TDAP Vaccine (Boostrix) 01/14/2016          Medications:  Current Outpatient Medications   Medication Sig Dispense Refill     cholecalciferol (VITAMIN D3) 5000 units (125 mcg) capsule Take 5,000 Units by mouth 2 times daily       ferrous sulfate (FEROSUL) 325 (65 Fe) MG tablet Take 325 mg by mouth daily (with breakfast)       gabapentin (NEURONTIN) 600 MG tablet Take 1 tablet (600 mg) by mouth 3 times daily 90 tablet 1     losartan (COZAAR) 25 MG tablet Take 1 tablet (25 mg) by mouth daily 90 tablet 1     medroxyPROGESTERone (PROVERA) 10 MG tablet Take 1 tablet (10 mg) by mouth daily 90 tablet 3     metFORMIN (GLUCOPHAGE-XR) 500 MG 24 hr tablet TAKE 2 TABLETS BY MOUTH 2 TIMES A DAY WITH MEALS. SWALLOW TABLETS WHOLE, DO NOT CRUSH, DIVIDE OR CHEW (Patient taking differently: TAKE 2 TABLETS BY MOUTH 3 TIMES A DAY WITH MEALS. SWALLOW TABLETS WHOLE, DO NOT CRUSH, DIVIDE OR CHEW) 120 tablet 11     naproxen sodium 220 MG capsule Take 440 mg by mouth daily as needed       sertraline (ZOLOFT) 100 MG tablet Take 1 tablet (100 mg) by mouth daily 90 tablet 3     Turmeric 500 MG CAPS          PHYSICAL EXAMINATION: (limited as pt was evaluated via video visit)  Vital signs: (not taken due to evaluation via video visit)    Skin: no facial rashes  Pulm: non-labored respirations, no conversational dyspnea  MSK: no hand joint swelling, able to make a fist b/l      Labs:      I have reviewed all pertinent investigations including labs, including outside records if relevant    RF/CCP  Recent Labs   Lab Test 03/25/21  1145   CCPIGG 2   RHF <7     PHILIP  Recent Labs   Lab Test 06/23/21  1156   MINI Positive*   ANAP1 SPECKLED   ANAT1 1:160     RNP/Sm/SSA/SSB  Recent Labs   Lab Test 06/28/21  0836   RNPIGG <0.2   SMIGG <0.2   SSAIGG <0.2   SSBIGG <0.2      dsDNA  Recent Labs   Lab Test 06/28/21  0836   DNA 1     CBC  Recent Labs   Lab Test 01/15/21  0754 10/10/19  1052 11/30/15  0853   WBC 7.4 6.7 6.9   RBC 4.57 4.63 4.60   HGB 13.1 12.5 13.0   HCT 40.3 39.2 39.3   MCV 88 85 85   RDW 13.1 13.7 13.3    418 482*   MCH 28.7 27.0 28.3   MCHC 32.5 31.9 33.1   NEUTROPHIL 44.8 43.1 41.5   LYMPH 38.1 42.9 42.7   MONOCYTE 8.6 7.0 8.0   EOSINOPHIL 6.9 5.2 6.5   BASOPHIL 1.3 1.5 1.2   ANEU 3.3 2.9 2.9   ALYM 2.8 2.9 3.0   DAVI 0.6 0.5 0.6   AEOS 0.5 0.4 0.5   ABAS 0.1 0.1 0.1     CMP  Recent Labs   Lab Test 06/23/21  1156 01/29/21  0735 01/15/21  0754 06/01/20  1302 07/06/18  1652 01/30/17  0956 01/24/17  1418     --  141 136 140  --  137   POTASSIUM 3.9  --  3.9 3.7 3.6  --  3.7   CHLORIDE 106  --  110* 106 104  --  107   CO2 29  --  26 27 28  --  26   ANIONGAP 2*  --  5 3 8  --  4   GLC 88  --  91 83 99 92 89   BUN 11  --  11 6* 12  --  9   CR 0.59  --  0.62 0.57 0.68  --  0.53   GFRESTIMATED >90  --  >90 >90 >90  --  >90  Non  GFR Calc     GFRESTBLACK >90  --  >90 >90 >90  --  >90  African American GFR Calc     PENNY 9.2  --  9.1 8.7 8.6  --  8.7   BILITOTAL 0.4 0.3 0.3 0.3  --   --  0.3   ALBUMIN 3.7 3.4 3.4 3.6  --   --  3.5   PROTTOTAL 7.6 7.4 7.2 7.2  --   --  7.5   ALKPHOS 70 57 60 55  --   --  60   AST 9 20 51* 11  --   --  12   ALT 18 53* 170* 28  --   --  19     HgA1c  Recent Labs   Lab Test 01/15/21  0754 06/01/20  1302   A1C 5.2 5.2     Iron Studies  Recent Labs   Lab Test 01/09/20  1516 10/10/19  1052   SOLO 38 8*   IRON 52 31*    362   IRONSAT 13* 9*     Calcium/VitaminD  Recent Labs   Lab Test 06/23/21  1156 01/15/21  0754 06/01/20  1302 07/06/18  1652 01/24/17  1418   PENNY 9.2 9.1 8.7 8.6 8.7   VITDT  --  63  --  27 23     ESR/CRP  Recent Labs   Lab Test 06/23/21  1156 03/25/21  1145   SED 17 24*   CRP 11.7* 13.0*     TSH/T4  Recent Labs   Lab Test 03/25/21  1145 07/06/18  1652 11/30/15  0853   TSH 0.99 1.34 2.70     Lipid  Panel  Recent Labs   Lab Test 01/15/21  0754 01/30/17  0956 11/30/15  0853   CHOL 162 195 187   TRIG 131 179* 199*   HDL 36* 42* 38*   * 117* 109*   NHDL 126 153* 149*     Hepatitis B  Recent Labs   Lab Test 01/09/20  1516   AUSAB 1.13     Lyme ab screening  Recent Labs   Lab Test 03/25/21  1145   LYMEGM 0.04     UA  Recent Labs   Lab Test 07/06/18  1655 01/24/17  1420 08/07/14  1526   COLOR Straw Light Yellow Yellow   APPEARANCE Clear Clear Slightly Cloudy   URINEGLC Negative Negative Negative   URINEBILI Negative Negative Negative   SG 1.006 1.009 1.019   URINEPH 6.5 7.0 6.5   PROTEIN Negative Negative 10*   NITRITE Negative Negative Negative   UBLD Negative Negative Trace*   LEUKEST Negative Negative Large*   WBCU  --   --  >182*   RBCU  --   --  4*   MUCOUS  --   --  Present*     Urine Microscopic  Recent Labs   Lab Test 08/07/14  1526   WBCU >182*   RBCU 4*   MUCOUS Present*       Imaging:     I have reviewed all pertinent investigations including imaging, including outside records if relevant    MR L spine (8/2/21)  IMPRESSION:   Grade 1 spondylolytic spondylolisthesis at the lumbosacral junction  with bilateral foraminal narrowing and bilateral L5 ganglionic  impingement, worse on the left.    XR R knee (6/23/21)  FINDINGS:  Severe osteoarthritic changes are seen at the  patellofemoral articulation. Mild degenerative changes are seen at the  lateral femoral tibial articulation. The medial femoral tibial  articulation appears normal. There is no knee effusion. Distal femur  proximal tibia and fibula are intact.                                                                       IMPRESSION: Severe osteoarthritic changes at the patellofemoral  articulation      XR L hip (6/23/21)  FINDINGS:  There are degenerative arthritic changes of the left hip  with joint space narrowing and degenerative osteophyte formation. The  acetabulum and proximal femur appear otherwise intact.                                                                     IMPRESSION: Moderate arthritic changes at the left hip      MR R hip (4/14/21)  IMPRESSION: Moderate to advanced chondromalacia and osteoarthritic  change of the right hip. There is degenerative change of the superior  labrum. There is a small to moderate-sized joint effusion.      There is osteoarthritic change of similar severity on the left.    Assessment / Plan:    Vannessa Molina is a 46 year old female with pmhx depression, HTN, GERD, R knee and b/l hip OA, is referred to rheumatology clinic for polyarthralgia and +PHILIP 1:160. Any prior notes, outside records, laboratory results, and imaging studies were reviewed if relevant. Pertinent work-up thus far includes +PHILIP 1:160 speckled, negative dsDNA, SSA, SSB, Sm, RNP, centromere; 3/25/21: negative lyme, TSH, RF, CCP. Arthralgia involves b/l knees and hips, history and imaging consistent with OA. She is asymptomatic in AM, symptoms worse at the end of the day. Has found some relief with R hip and R knee intraarticular corticosteroid injection. Does not have specific symptoms of systemic autoimmune CTD -no mucocutaneous lesions, photosensitivity, raynaud's, pleurisy, alopecia. Disease specific autoantibodies are negative, supporting absence of clinical symptoms. No indication to start IS therapy. I recommended that she follows up with physiatry as scheduled and/or orthopedics for her osteoarthritis and radiculopathy management.     We discussed that a positive PHILIP can be seen in a variety of different diseases and syndrome.  The anti-nuclear antibody is a screening test for auto-immune disease.  Depending on the level or titer, it may be seen in autoimmune diseases such as SLE, sjogren's, scleroderma but could also be seen in auto-immune hepatitis, hypothyroidism and various other disease.  A positive PHILIP does not give one a conclusive diagnosis of SLE as it may be a false positive and may not be significant as well.  Further w/u for  auto-immune disease can be considered based on clinical symptoms and other laboratory findings.      1) +PHILIP 1:160  -check C3, C4, UA, UPC for sake of completion     2) Polyarthralgia, b/l knee and b/l hip, 2/2 OA  Discussed comprehensive management options of OA. Discussed that there is no disease modifying agent for prevention of OA progression  -can discuss duloxetine with PCP  -has an upcoming appointment with physiatry on 8/20  -if arthrocentesis performed, can send synovial fluid for analysis (WBC, crystals)  #Knee:  -Exercises include walking, strengthening, neuromuscular training, and aquatic exercise, self-efficacy and self-management programs  -Weight loss, sasha chi,cane   -Knee brace (TF) recommended for tibiofemoral OA and (PF) for patellofemoral OA  -Intraarticular steroids  -Topical NSAIDs  -Acupuncture, cognitive behavioral therapy, heat/therapeutic cooling, yoga, balance training, kinesiotaping, RFA  -Tylenol, duloxetine, topical capsaicin  #Hip:  -Exercises include walking, strengthening, neuromuscular training, and aquatic exercise, self-efficacy and self-management programs  -Weight loss, sasha chi,cane   -Intraarticular steroids (imaging-guided)  -Acupuncture, cognitive behavioral therapy, heat/therapeutic cooling, yoga, balance training  -Tylenol, duloxetine        Ms. Molina verbalized agreement with and understanding of the rationale for the diagnosis and treatment plan.  All questions were answered to best of my ability and the patient's satisfaction. Ms. Molina was advised to contact the clinic with any questions that may arise after the clinic visit.          Chart documentation done in part with Dragon Voice recognition Software. Although reviewed after completion, some word and grammatical error may remain.      RTC PRSHANON Vee MD

## 2021-08-10 ENCOUNTER — TELEPHONE (OUTPATIENT)
Dept: FAMILY MEDICINE | Facility: OTHER | Age: 46
End: 2021-08-10

## 2021-08-10 NOTE — TELEPHONE ENCOUNTER
10:21 AM    Reason for Call: OVERBOOK    Patient was in ED on 8/2/21 please c/b to schedule appt     The patient is requesting an appointment for a ED follow up  with Dr. Suarez or another provider if avail .    Was an appointment offered for this call? No  If yes : Appointment type              Date    Preferred method for responding to this message: Telephone Call  What is your phone number ?    If we cannot reach you directly, may we leave a detailed response at the number you provided? Yes    Can this message wait until your PCP/provider returns, if unavailable today? Tricia Huerta

## 2021-08-11 ENCOUNTER — VIRTUAL VISIT (OUTPATIENT)
Dept: RHEUMATOLOGY | Facility: CLINIC | Age: 46
End: 2021-08-11
Payer: COMMERCIAL

## 2021-08-11 DIAGNOSIS — R76.8 POSITIVE ANA (ANTINUCLEAR ANTIBODY): ICD-10-CM

## 2021-08-11 DIAGNOSIS — M25.50 ARTHRALGIA, UNSPECIFIED JOINT: ICD-10-CM

## 2021-08-11 PROCEDURE — 99203 OFFICE O/P NEW LOW 30 MIN: CPT | Mod: 95 | Performed by: STUDENT IN AN ORGANIZED HEALTH CARE EDUCATION/TRAINING PROGRAM

## 2021-08-11 NOTE — PATIENT INSTRUCTIONS
1. Please make an appointment with lab at any [facility] near you  2. I will be in touch one lab results are back  3. Follow-up with physiatry and/or orthopedics for osteoarthritis and radiculopathy management  4. Follow-up as needed

## 2021-08-11 NOTE — TELEPHONE ENCOUNTER
Please call pt to offer next available as er stating followup with PCP, no time frame/limit specified.  Seen for back pain (chronic issue)  Glo Pham LPN

## 2021-08-11 NOTE — TELEPHONE ENCOUNTER
Attempt # 1  Outcome: Left Message   Comment: lvm for pt to call back to schedule follow up for chronic back pain with dr fleming at next available appointment

## 2021-08-14 DIAGNOSIS — I10 BENIGN ESSENTIAL HYPERTENSION: ICD-10-CM

## 2021-08-17 RX ORDER — LOSARTAN POTASSIUM 25 MG/1
TABLET ORAL
Qty: 90 TABLET | Refills: 1 | Status: SHIPPED | OUTPATIENT
Start: 2021-08-17 | End: 2022-03-28

## 2021-08-19 ENCOUNTER — LAB (OUTPATIENT)
Dept: LAB | Facility: OTHER | Age: 46
End: 2021-08-19
Payer: COMMERCIAL

## 2021-08-19 DIAGNOSIS — R76.8 POSITIVE ANA (ANTINUCLEAR ANTIBODY): ICD-10-CM

## 2021-08-19 DIAGNOSIS — M25.50 ARTHRALGIA, UNSPECIFIED JOINT: ICD-10-CM

## 2021-08-19 LAB
ALBUMIN UR-MCNC: 20 MG/DL
APPEARANCE UR: CLEAR
BACTERIA #/AREA URNS HPF: ABNORMAL /HPF
BILIRUB UR QL STRIP: NEGATIVE
COLOR UR AUTO: YELLOW
CREAT UR-MCNC: 216 MG/DL
GLUCOSE UR STRIP-MCNC: NEGATIVE MG/DL
HGB UR QL STRIP: NEGATIVE
KETONES UR STRIP-MCNC: NEGATIVE MG/DL
LEUKOCYTE ESTERASE UR QL STRIP: NEGATIVE
MUCOUS THREADS #/AREA URNS LPF: PRESENT /LPF
NITRATE UR QL: NEGATIVE
PH UR STRIP: 7 [PH] (ref 4.7–8)
PROT UR-MCNC: 0.25 G/L
PROT/CREAT 24H UR: 0.12 G/G CR (ref 0–0.2)
RBC URINE: <1 /HPF
SP GR UR STRIP: 1.02 (ref 1–1.03)
UROBILINOGEN UR STRIP-MCNC: 2 MG/DL
WBC URINE: 2 /HPF

## 2021-08-19 PROCEDURE — 86160 COMPLEMENT ANTIGEN: CPT | Mod: 59

## 2021-08-19 PROCEDURE — 81001 URINALYSIS AUTO W/SCOPE: CPT

## 2021-08-19 PROCEDURE — 84156 ASSAY OF PROTEIN URINE: CPT

## 2021-08-19 PROCEDURE — 36415 COLL VENOUS BLD VENIPUNCTURE: CPT

## 2021-08-19 PROCEDURE — 86160 COMPLEMENT ANTIGEN: CPT

## 2021-08-20 ENCOUNTER — TRANSFERRED RECORDS (OUTPATIENT)
Dept: HEALTH INFORMATION MANAGEMENT | Facility: CLINIC | Age: 46
End: 2021-08-20

## 2021-08-20 ENCOUNTER — MYC MEDICAL ADVICE (OUTPATIENT)
Dept: FAMILY MEDICINE | Facility: OTHER | Age: 46
End: 2021-08-20

## 2021-08-20 DIAGNOSIS — M25.50 ARTHRALGIA, UNSPECIFIED JOINT: Primary | ICD-10-CM

## 2021-08-20 LAB
C3 SERPL-MCNC: 151 MG/DL (ref 81–157)
C4 SERPL-MCNC: 31 MG/DL (ref 13–39)

## 2021-08-23 NOTE — RESULT ENCOUNTER NOTE
Dear Vannessa,     Your labs are negative. Urine test showed mild protein and bacteria. If you are having any urinary tract infection symptoms such as burning/pain, increased frequency, fevers, please contact your primary care provider. The ratio for your protein in the urine was in the normal range, which is reassuring. No further testing needed from my end. Would recommend follow-up with orthopedics/sports medicine for osteoarthritis management.     Please let us know if you have any questions or concerns.    Regards,  Awa Vee MD

## 2021-08-25 NOTE — TELEPHONE ENCOUNTER
Physical medicine consult from Dr Mcarthur 8/20/21 reviewed.  Bethel her knee/hip orthopedic issues needed to be addressed first - as that seemed to be limiting her ability to partake in other treatment modalities, exercise.    Patient had seen ortho associates prior and had injections.      Would advise follow up with orthopedics - to assess response and potential surgical options. Per Dr Boland's note surgery was deferred at this time.   If wanting second opinion - can pend referral for orthopedics.    I do not see a copy of her rheumatology consult?  Where was it and can we get a copy?     Thank you.

## 2021-08-26 RX ORDER — GABAPENTIN 300 MG/1
900 CAPSULE ORAL 3 TIMES DAILY
Qty: 90 CAPSULE | Refills: 1 | Status: SHIPPED | OUTPATIENT
Start: 2021-08-26 | End: 2021-11-12

## 2021-08-26 NOTE — TELEPHONE ENCOUNTER
Pt notified.  Pt was also wondering if there was any feedback/concern of small cyst 2mm noted in ovary from MRI results at er visit on 8/2/21 due to family history of cervical cancers. Pt has appt coming up she is ok with discussing then also.  Glo Pham LPN

## 2021-08-26 NOTE — TELEPHONE ENCOUNTER
Spoke with patient-Update-   placed referral to [facility] with  appt is 9/7/21.-then scheduled to follow up with PCP.  Pt Stated she met with  in [facility] from rheumatology via video visit and they stated there wasn't much they could do for her other than mentioned a medication she could try.  Pt was wondering if she could increase her gabapentin as discuss or something for pain. Her prednisone helped for the first couple days but now pain is back.  Please advise.  Glo Pham LPN

## 2021-08-26 NOTE — TELEPHONE ENCOUNTER
Yes - Gabapentin is listed that she is taking 600mg TID.  Can increase to 900mg TID.  Will send new script.

## 2021-09-01 ENCOUNTER — OFFICE VISIT (OUTPATIENT)
Dept: PSYCHOLOGY | Facility: OTHER | Age: 46
End: 2021-09-01
Attending: COUNSELOR
Payer: COMMERCIAL

## 2021-09-01 DIAGNOSIS — F33.1 MODERATE EPISODE OF RECURRENT MAJOR DEPRESSIVE DISORDER (H): ICD-10-CM

## 2021-09-01 DIAGNOSIS — F50.811 BINGE-EATING DISORDER, MODERATE: Primary | ICD-10-CM

## 2021-09-01 PROCEDURE — 90837 PSYTX W PT 60 MINUTES: CPT | Performed by: COUNSELOR

## 2021-09-01 ASSESSMENT — PATIENT HEALTH QUESTIONNAIRE - PHQ9
SUM OF ALL RESPONSES TO PHQ QUESTIONS 1-9: 11
5. POOR APPETITE OR OVEREATING: NOT AT ALL

## 2021-09-01 ASSESSMENT — ANXIETY QUESTIONNAIRES
5. BEING SO RESTLESS THAT IT IS HARD TO SIT STILL: NOT AT ALL
1. FEELING NERVOUS, ANXIOUS, OR ON EDGE: NOT AT ALL
2. NOT BEING ABLE TO STOP OR CONTROL WORRYING: NOT AT ALL
7. FEELING AFRAID AS IF SOMETHING AWFUL MIGHT HAPPEN: NOT AT ALL
GAD7 TOTAL SCORE: 1
3. WORRYING TOO MUCH ABOUT DIFFERENT THINGS: NOT AT ALL
IF YOU CHECKED OFF ANY PROBLEMS ON THIS QUESTIONNAIRE, HOW DIFFICULT HAVE THESE PROBLEMS MADE IT FOR YOU TO DO YOUR WORK, TAKE CARE OF THINGS AT HOME, OR GET ALONG WITH OTHER PEOPLE: NOT DIFFICULT AT ALL
6. BECOMING EASILY ANNOYED OR IRRITABLE: SEVERAL DAYS

## 2021-09-01 NOTE — PROGRESS NOTES
"The author of this note documented a reason for not sharing it with the patient.    Counseling Progress Note    Client Name: Vannessa Molina    Date of Service (when I saw the patient): 09/01/2021    Service Type: Individual Psychotherapy    Session Start Time:  1:00 pm  Session End Time: 2:00 pm  Session Length: I spent 53+ minutes performing psychotherapy during this office visit.  Session Number: 15    DSM5 Diagnoses: 300.02 (F41.1) Generalized Anxiety Disorder; 307.51 (F50.8) Binge-Eating Disorder, Severity: Moderate    Attendees: Client     Health Maintenance Topics with due status: Due Soon       Topic Date Due    NIRAJ ASSESSMENT 02/17/2021       Treatment Plan Due Date: 05/12/2021  Diagnostic Assessment Due Date: 2/2/2022    DATA    Treatment Objective(s) Addressed in This Session: Improve self-acceptance, focus on self - happiness, acceptance, feeling content, improve ability to feel / experience emotions, reduce self-defeating behaviors    Progress on / Status of Treatment Objective(s) / Homework: Vannessa continues to have major struggles with joint pain and mobility. She says she have good days and bad ones. Vannessa expressed frustration over continued struggles with weight and binge eating. She talked about spending time thinking about what her main \"problem\" is and thoughts that this clinician must think she's crazy.  Vannessa accepted proposed homework to think about and write two things she likes about herself every day. She was hesitant, stating this would be difficulty but accepted any ways.     Intervention: Reframed her thoughts about, her main \"problem\", to thinking about it in terms of where she is now and where she wants to go. Talked about her reluctance to allow herself to \"feel\" in front of anyone. She will grieve her father but does not like to do so around her family because she does not want them to feel badly because she does. Challenged this by looking at it from the reverse perspective when she is " "able to support her family members when they are sad or hurting. Reviewed goals for therapy. Explored self-esteem and self-acceptance and agreed to prioritize this as the main focus at this time. Proposed \"homework\", which Vannessa accepted.     Current Stressors / Issues: Mental health symptoms, joint pain and limited mobility, eating/diet    Medication Review: No new medications.     Medication Compliance: Takes medications as prescribed    Changes in Health: Hip, knee and lower back pain. Positive test for an autoimmune disease - waiting to see a Rheumatologist. Has an appointment with a surgeon to see about surgical options for her pain and loss of mobility in her hips and knees.     Chemical Use Review:   Substance Use: No    Tobacco Use: No    ASSESSMENT: Current Emotional / Mental Status (status of significant symptoms):  Risk status no evidence of suicidal or homicidal ideation  Client denies current fears or concerns for personal safety.  A safety and risk management plan has not been developed at this time; however client was given the after-hours number should there be a change in any of these risk factors.    Appearance:                            Appropriate   Eye Contact:                           Good   Psychomotor Behavior:          Fidgety  Attitude:                                   Anxious  Orientation:                             All  Speech              Rate / Production:      Rapid speech, rambling              Volume:                       Normal   Mood:                                      Normal  Affect:                                      Flat  Thought Content:                    Clear   Thought Form:                        Tangentile  Insight:                                     Fair     Collateral Reports Completed: PHQ9 & GAD7      PLAN: Weekly therapy sessions. Check-in on the homework Vannessa agreed to, to think about, identify and write down two things she likes about herself every day " until her next appointment. She also agreed to spend time noticing when she sees others being, what she perceives as, either strong or weak. Continue focus on building self-acceptance/self-love.     Pattie Bucio, [facility]

## 2021-09-02 ASSESSMENT — ANXIETY QUESTIONNAIRES: GAD7 TOTAL SCORE: 1

## 2021-09-09 ENCOUNTER — OFFICE VISIT (OUTPATIENT)
Dept: FAMILY MEDICINE | Facility: OTHER | Age: 46
End: 2021-09-09
Attending: COUNSELOR
Payer: COMMERCIAL

## 2021-09-09 ENCOUNTER — OFFICE VISIT (OUTPATIENT)
Dept: PSYCHOLOGY | Facility: OTHER | Age: 46
End: 2021-09-09
Attending: COUNSELOR
Payer: COMMERCIAL

## 2021-09-09 VITALS
RESPIRATION RATE: 16 BRPM | HEART RATE: 84 BPM | TEMPERATURE: 98.1 F | WEIGHT: 279 LBS | DIASTOLIC BLOOD PRESSURE: 82 MMHG | BODY MASS INDEX: 43.79 KG/M2 | SYSTOLIC BLOOD PRESSURE: 124 MMHG | OXYGEN SATURATION: 99 % | HEIGHT: 67 IN

## 2021-09-09 DIAGNOSIS — M16.10 HIP ARTHRITIS: ICD-10-CM

## 2021-09-09 DIAGNOSIS — G89.29 OTHER CHRONIC PAIN: ICD-10-CM

## 2021-09-09 DIAGNOSIS — R82.90 ABNORMAL FINDING ON URINALYSIS: ICD-10-CM

## 2021-09-09 DIAGNOSIS — F33.9 EPISODE OF RECURRENT MAJOR DEPRESSIVE DISORDER, UNSPECIFIED DEPRESSION EPISODE SEVERITY (H): ICD-10-CM

## 2021-09-09 DIAGNOSIS — E66.01 MORBID OBESITY (H): ICD-10-CM

## 2021-09-09 DIAGNOSIS — F33.1 MODERATE EPISODE OF RECURRENT MAJOR DEPRESSIVE DISORDER (H): ICD-10-CM

## 2021-09-09 DIAGNOSIS — F50.811 BINGE-EATING DISORDER, MODERATE: Primary | ICD-10-CM

## 2021-09-09 DIAGNOSIS — M25.50 ARTHRALGIA, UNSPECIFIED JOINT: Primary | ICD-10-CM

## 2021-09-09 DIAGNOSIS — M43.10 SPONDYLOLYSIS WITH SPONDYLOLISTHESIS: ICD-10-CM

## 2021-09-09 DIAGNOSIS — M17.10 ARTHRITIS OF KNEE: ICD-10-CM

## 2021-09-09 LAB
ALBUMIN UR-MCNC: NEGATIVE MG/DL
APPEARANCE UR: CLEAR
BILIRUB UR QL STRIP: NEGATIVE
COLOR UR AUTO: NORMAL
GLUCOSE UR STRIP-MCNC: NEGATIVE MG/DL
HGB UR QL STRIP: NEGATIVE
KETONES UR STRIP-MCNC: NEGATIVE MG/DL
LEUKOCYTE ESTERASE UR QL STRIP: NEGATIVE
NITRATE UR QL: NEGATIVE
PH UR STRIP: 6 [PH] (ref 4.7–8)
SP GR UR STRIP: 1.02 (ref 1–1.03)
UROBILINOGEN UR STRIP-MCNC: NORMAL MG/DL

## 2021-09-09 PROCEDURE — 99214 OFFICE O/P EST MOD 30 MIN: CPT | Performed by: FAMILY MEDICINE

## 2021-09-09 PROCEDURE — 81003 URINALYSIS AUTO W/O SCOPE: CPT | Performed by: FAMILY MEDICINE

## 2021-09-09 PROCEDURE — 90837 PSYTX W PT 60 MINUTES: CPT | Performed by: COUNSELOR

## 2021-09-09 RX ORDER — IBUPROFEN 800 MG/1
800 TABLET, FILM COATED ORAL EVERY 8 HOURS PRN
COMMUNITY
End: 2022-06-20

## 2021-09-09 RX ORDER — DULOXETIN HYDROCHLORIDE 30 MG/1
30 CAPSULE, DELAYED RELEASE ORAL DAILY
Qty: 30 CAPSULE | Refills: 1 | Status: SHIPPED | OUTPATIENT
Start: 2021-09-09 | End: 2021-09-29

## 2021-09-09 RX ORDER — SERTRALINE HYDROCHLORIDE 100 MG/1
50 TABLET, FILM COATED ORAL DAILY
Qty: 7 TABLET | Refills: 0 | COMMUNITY
Start: 2021-09-09 | End: 2021-09-29

## 2021-09-09 ASSESSMENT — ANXIETY QUESTIONNAIRES
2. NOT BEING ABLE TO STOP OR CONTROL WORRYING: NOT AT ALL
IF YOU CHECKED OFF ANY PROBLEMS ON THIS QUESTIONNAIRE, HOW DIFFICULT HAVE THESE PROBLEMS MADE IT FOR YOU TO DO YOUR WORK, TAKE CARE OF THINGS AT HOME, OR GET ALONG WITH OTHER PEOPLE: NOT DIFFICULT AT ALL
7. FEELING AFRAID AS IF SOMETHING AWFUL MIGHT HAPPEN: NOT AT ALL
1. FEELING NERVOUS, ANXIOUS, OR ON EDGE: NOT AT ALL
6. BECOMING EASILY ANNOYED OR IRRITABLE: SEVERAL DAYS
5. BEING SO RESTLESS THAT IT IS HARD TO SIT STILL: NOT AT ALL
3. WORRYING TOO MUCH ABOUT DIFFERENT THINGS: NOT AT ALL
GAD7 TOTAL SCORE: 1

## 2021-09-09 ASSESSMENT — PATIENT HEALTH QUESTIONNAIRE - PHQ9
5. POOR APPETITE OR OVEREATING: NOT AT ALL
SUM OF ALL RESPONSES TO PHQ QUESTIONS 1-9: 10

## 2021-09-09 ASSESSMENT — MIFFLIN-ST. JEOR: SCORE: 1943.17

## 2021-09-09 ASSESSMENT — PAIN SCALES - GENERAL: PAINLEVEL: SEVERE PAIN (6)

## 2021-09-09 NOTE — NURSING NOTE
"Chief Complaint   Patient presents with     Other     Review specialist visits       Initial /82 (BP Location: Right arm, Patient Position: Sitting, Cuff Size: Adult Large)   Pulse 84   Temp 98.1  F (36.7  C) (Tympanic)   Resp 16   Ht 1.702 m (5' 7\")   Wt 126.6 kg (279 lb)   SpO2 99%   BMI 43.70 kg/m   Estimated body mass index is 43.7 kg/m  as calculated from the following:    Height as of this encounter: 1.702 m (5' 7\").    Weight as of this encounter: 126.6 kg (279 lb).  Medication Reconciliation: complete  Claudia Barcenas LPN    "
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
## 2021-09-09 NOTE — PATIENT INSTRUCTIONS
Cut Zoloft in 1/2 to 50 mg daily for 1 week, then stop.  Then start Cymbalta 30 mg daily.  Update me via mychart after 1-2 weeks.  May need to increase dose to 60 mg at that time if tolerating fine and no clinical improvement.    Taper down on the Gabapentin capsules:  Go down by 1 capsule per day.  3/3/3  3/2/3  2/2/3  2/1/3  1/1/3  0/0/3    Then see if wanting to keep the night time dose or not?      Follow through with hip injections.  Continue topical agents.  Await relief from knee injections.  Continue counseling.    Repeat urine testing today.

## 2021-09-09 NOTE — PROGRESS NOTES
"    Assessment & Plan     Arthralgia, unspecified joint  - DULoxetine (CYMBALTA) 30 MG capsule; Take 1 capsule (30 mg) by mouth daily    Hip arthritis  - DULoxetine (CYMBALTA) 30 MG capsule; Take 1 capsule (30 mg) by mouth daily    Arthritis of knee  - DULoxetine (CYMBALTA) 30 MG capsule; Take 1 capsule (30 mg) by mouth daily    Spondylolysis with spondylolisthesis  - DULoxetine (CYMBALTA) 30 MG capsule; Take 1 capsule (30 mg) by mouth daily    Morbid obesity (H)  Considering bariatric surgery consult.    Episode of recurrent major depressive disorder, unspecified depression episode severity (H)  - sertraline (ZOLOFT) 100 MG tablet; Take 0.5 tablets (50 mg) by mouth daily    Other chronic pain  - DULoxetine (CYMBALTA) 30 MG capsule; Take 1 capsule (30 mg) by mouth daily    Abnormal finding on urinalysis  - UA reflex to Microscopic and Culture; Future    Note to Kip -  PT - resume services or wait?    33 minutes spent on the date of the encounter doing chart review, history and exam, documentation and further activities per the note       BMI:   Estimated body mass index is 43.7 kg/m  as calculated from the following:    Height as of this encounter: 1.702 m (5' 7\").    Weight as of this encounter: 126.6 kg (279 lb).   Weight management plan: Discussed healthy diet and exercise guidelines    Patient Instructions   Cut Zoloft in 1/2 to 50 mg daily for 1 week, then stop.  Then start Cymbalta 30 mg daily.  Update me via mychart after 1-2 weeks.  May need to increase dose to 60 mg at that time if tolerating fine and no clinical improvement.    Taper down on the Gabapentin capsules:  Go down by 1 capsule per day.  3/3/3  3/2/3  2/2/3  2/1/3  1/1/3  0/0/3    Then see if wanting to keep the night time dose or not?      Follow through with hip injections.  Continue topical agents.  Await relief from knee injections.  Continue counseling.    Repeat urine testing today.      No follow-ups on file.    Aaliyah Sethi, " "MD  [facility] - KINA Hurd is a 46 year old who presents for the following health issues     HPI     Chronic Pain Follow-Up  Where in your body do you have pain? Knees,hip  How has your pain affected your ability to work? 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 Poor  How has your mood been since your last visit? About the same  Have you had a significant life event? No  Other aggravating factors: none  Taking medication as directed? Yes  Patient reports gabapentin is not working.    PHQ-9 SCORE 8/5/2021 9/1/2021 9/9/2021   PHQ-9 Total Score MyChart - - -   PHQ-9 Total Score 10 11 10     NIRAJ-7 SCORE 8/5/2021 9/1/2021 9/9/2021   Total Score - - -   Total Score 2 1 1     No flowsheet data found.  Encounter-Level CSA:    There are no encounter-level csa.     Patient-Level CSA:    There are no patient-level csa.          Discuss specialist visit    ER visit 8/2/21.  Spondylolytic spondylolisthesis.    Rheumatology 8/11/21 virtual; Dr Vee- labs; possible UTI?  Abnormal UA with >182 WBC, but no culture done. No ongoing follow up advised.  Consider Cymbalta.    Physiologist - hips and knees like \"70 years old\".  Referred to Dr Franco, [facility].  Did bilateral knee injections 2 days ago. Will be having hip injections under xray in 2 weeks at [facility].    Review of Systems   Constitutional, HEENT, cardiovascular, pulmonary, gi and gu systems are negative, except as otherwise noted.      Objective    /82 (BP Location: Right arm, Patient Position: Sitting, Cuff Size: Adult Large)   Pulse 84   Temp 98.1  F (36.7  C) (Tympanic)   Resp 16   Ht 1.702 m (5' 7\")   Wt 126.6 kg (279 lb)   SpO2 99%   BMI 43.70 kg/m    Body mass index is 43.7 kg/m .  Physical Exam   GENERAL: alert, no distress and obese  RESP: lungs clear to auscultation - no rales, rhonchi or wheezes  CV: regular rate and rhythm, normal S1 S2, no " S3 or S4, no murmur, click or rub, no peripheral edema and peripheral pulses strong  MS: normal muscle tone, no edema, peripheral pulses normal and gait is antalgic - cane use  PSYCH: mentation appears normal, affect normal/bright    UA/UC pending.

## 2021-09-09 NOTE — LETTER
[facility] - [place]  [address]  [place] [address]  Phone: [phone]    September 9, 2021      Re:  Vannessa Molina  [address]  [place] [address]          To whom it may concern:    RE: Vannessa Molina    Patient was seen and treated today at our clinic.    Patient may continue to work with the following:    Bend: Not at all (0 hours)  Squat: Not at all (0 hours)  Lift, carry, push, and pull no more than: 0-10 lbs.     Please contact me for questions or concerns.      Sincerely,        Aaliyah Sethi MD

## 2021-09-10 ASSESSMENT — ANXIETY QUESTIONNAIRES: GAD7 TOTAL SCORE: 1

## 2021-09-20 ENCOUNTER — MYC MEDICAL ADVICE (OUTPATIENT)
Dept: FAMILY MEDICINE | Facility: OTHER | Age: 46
End: 2021-09-20

## 2021-09-20 DIAGNOSIS — M43.10 SPONDYLOLYSIS WITH SPONDYLOLISTHESIS: ICD-10-CM

## 2021-09-20 DIAGNOSIS — G89.29 OTHER CHRONIC PAIN: ICD-10-CM

## 2021-09-20 DIAGNOSIS — M25.50 ARTHRALGIA, UNSPECIFIED JOINT: ICD-10-CM

## 2021-09-20 DIAGNOSIS — M16.10 HIP ARTHRITIS: ICD-10-CM

## 2021-09-20 DIAGNOSIS — M17.10 ARTHRITIS OF KNEE: ICD-10-CM

## 2021-09-29 RX ORDER — DULOXETIN HYDROCHLORIDE 30 MG/1
30 CAPSULE, DELAYED RELEASE ORAL 2 TIMES DAILY
Qty: 60 CAPSULE | Refills: 1 | Status: SHIPPED | OUTPATIENT
Start: 2021-09-29 | End: 2021-10-29

## 2021-09-30 ENCOUNTER — TELEPHONE (OUTPATIENT)
Dept: FAMILY MEDICINE | Facility: OTHER | Age: 46
End: 2021-09-30

## 2021-09-30 NOTE — TELEPHONE ENCOUNTER
Received APPROVAL from [employer] for DULoxetine HCl 30MG dr capsules. Effective 08/31/2021 - 09/29/2024. Forms scanned to Epic.

## 2021-09-30 NOTE — TELEPHONE ENCOUNTER
Received PA from [facility] for DULoxetine HCl 30MG dr capsules. Submitted on CMM. Waiting for response.

## 2021-10-02 ENCOUNTER — HEALTH MAINTENANCE LETTER (OUTPATIENT)
Age: 46
End: 2021-10-02

## 2021-10-06 ENCOUNTER — OFFICE VISIT (OUTPATIENT)
Dept: PSYCHOLOGY | Facility: OTHER | Age: 46
End: 2021-10-06
Attending: COUNSELOR
Payer: COMMERCIAL

## 2021-10-06 DIAGNOSIS — F41.1 GAD (GENERALIZED ANXIETY DISORDER): ICD-10-CM

## 2021-10-06 DIAGNOSIS — F50.811 BINGE-EATING DISORDER, MODERATE: Primary | ICD-10-CM

## 2021-10-06 DIAGNOSIS — F33.1 MODERATE EPISODE OF RECURRENT MAJOR DEPRESSIVE DISORDER (H): ICD-10-CM

## 2021-10-06 PROCEDURE — 90837 PSYTX W PT 60 MINUTES: CPT | Performed by: COUNSELOR

## 2021-10-06 ASSESSMENT — PATIENT HEALTH QUESTIONNAIRE - PHQ9
5. POOR APPETITE OR OVEREATING: NOT AT ALL
SUM OF ALL RESPONSES TO PHQ QUESTIONS 1-9: 12

## 2021-10-06 ASSESSMENT — ANXIETY QUESTIONNAIRES
GAD7 TOTAL SCORE: 5
2. NOT BEING ABLE TO STOP OR CONTROL WORRYING: SEVERAL DAYS
3. WORRYING TOO MUCH ABOUT DIFFERENT THINGS: SEVERAL DAYS
6. BECOMING EASILY ANNOYED OR IRRITABLE: SEVERAL DAYS
1. FEELING NERVOUS, ANXIOUS, OR ON EDGE: SEVERAL DAYS
IF YOU CHECKED OFF ANY PROBLEMS ON THIS QUESTIONNAIRE, HOW DIFFICULT HAVE THESE PROBLEMS MADE IT FOR YOU TO DO YOUR WORK, TAKE CARE OF THINGS AT HOME, OR GET ALONG WITH OTHER PEOPLE: SOMEWHAT DIFFICULT
5. BEING SO RESTLESS THAT IT IS HARD TO SIT STILL: NOT AT ALL
7. FEELING AFRAID AS IF SOMETHING AWFUL MIGHT HAPPEN: SEVERAL DAYS

## 2021-10-06 NOTE — PROGRESS NOTES
The author of this note documented a reason for not sharing it with the patient.    Counseling Progress Note    Client Name: Vannessa Molina    Date of Service (when I saw the patient): 10/06/2021    Service Type: Individual Psychotherapy    Session Start Time:  8:30 am  Session End Time: 9:30 am  Session Length: I spent 53+ minutes performing psychotherapy during this office visit.  Session Number: 17    DSM5 Diagnoses: 300.02 (F41.1) Generalized Anxiety Disorder; 307.51 (F50.8) Binge-Eating Disorder, Severity: Moderate    Attendees: Client     Health Maintenance Topics with due status: Due Soon       Topic Date Due    NIRAJ ASSESSMENT 02/17/2021       Treatment Plan Due Date: 05/12/2021  Diagnostic Assessment Due Date: 2/2/2022    DATA    Treatment Objective(s) Addressed in This Session: Improve self-acceptance, focus on self - happiness, acceptance, feeling content, improve ability to feel / experience emotions, reduce self-defeating behaviors    Progress on / Status of Treatment Objective(s) / Homework: Vannessa gave an update on her physical health. 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 She has had shots in her hips and in one knee, which has provided a lot of relief. She has an appointment for shots in the second knee where she is currently still experiencing pretty significant pain. Vannessa began working with a weight management doctor again. Vannessa shared about a medication change from Zoloft to Cymbalta. She does not feel this has been a positive change. She reports feeling more down and depressed recently. She plans to talk with her PCP at her next appointment in approximately two weeks.     Intervention: Active listening, reflections and validation. Provided validation and empathic listening while Vannessa shared a difficult experience at work. Explored her work on self-care and improving her self-esteem. Pointed out the success she appears to be having without identifying this as success.    Talked about her weight program at [facility]. Taught about attuned eating and tuning into her hunger level in order to eat when she is hungry and stop when satisfied. Introduced the hunger scale from empty or starving (1) to painfully full (10).    Current Stressors / Issues: Mental health symptoms, joint pain and limited mobility, eating/diet, work    Medication Review: Med change from Zoloft to Cymbalta - plans to talk with PCP at follow-up appointment, if this still does not seem to be a positive change.    Medication Compliance: Takes medications as prescribed    Changes in Health: Vannessa has had some relief from shots in her hips and one knee    Chemical Use Review:   Substance Use: No    Tobacco Use: No    ASSESSMENT: Current Emotional / Mental Status (status of significant symptoms):  Risk status no evidence of suicidal or homicidal ideation  Client denies current fears or concerns for personal safety.  A safety and risk management plan has not been developed at this time; however client was given the after-hours number should there be a change in any of these risk factors.    Appearance:                            Appropriate   Eye Contact:                           Good   Psychomotor Behavior:          Fidgety  Attitude:                                   Anxious  Orientation:                             All  Speech              Rate / Production:      Rapid speech, rambling              Volume:                       Normal   Mood:                                      Normal  Affect:                                      Flat  Thought Content:                    Clear   Thought Form:                        Focussed   Insight:                                     Good    Collateral Reports Completed: PHQ9 & GAD7    PLAN: Weekly therapy sessions. Continue work with self-esteem. Check-in with Vannessa about her plan to be more tuned in with her eating and hunger.       Pattie Bucio, [facility]

## 2021-10-07 ASSESSMENT — ANXIETY QUESTIONNAIRES: GAD7 TOTAL SCORE: 5

## 2021-10-12 ENCOUNTER — OFFICE VISIT (OUTPATIENT)
Dept: PSYCHOLOGY | Facility: OTHER | Age: 46
End: 2021-10-12
Attending: COUNSELOR
Payer: COMMERCIAL

## 2021-10-12 DIAGNOSIS — F33.1 MODERATE EPISODE OF RECURRENT MAJOR DEPRESSIVE DISORDER (H): Primary | ICD-10-CM

## 2021-10-12 DIAGNOSIS — F50.811 BINGE-EATING DISORDER, MODERATE: ICD-10-CM

## 2021-10-12 PROCEDURE — 90837 PSYTX W PT 60 MINUTES: CPT | Performed by: COUNSELOR

## 2021-10-12 NOTE — PROGRESS NOTES
The author of this note documented a reason for not sharing it with the patient.    Counseling Progress Note    Client Name: Vannessa Molina    Date of Service (when I saw the patient): 10/12/2021    Service Type: Individual Psychotherapy    Session Start Time:  9:05 am  Session End Time: 10:05 am  Session Length: I spent 53+ minutes performing psychotherapy during this office visit.  Session Number: 18    DSM5 Diagnoses: 300.02 (F41.1) Generalized Anxiety Disorder; 307.51 (F50.8) Binge-Eating Disorder, Severity: Moderate    Attendees: Client     Health Maintenance Topics with due status: Due Soon       Topic Date Due    NIRAJ ASSESSMENT 02/17/2021       Treatment Plan Due Date: 05/12/2021  Diagnostic Assessment Due Date: 2/2/2022    DATA    Treatment Objective(s) Addressed in This Session: Improve self-acceptance, feeling content, improve ability to feel / experience emotions, reduce self-defeating behaviors    Progress on / Status of Treatment Objective(s) / Homework: Vannessa paid attention to how she was feeling and what may have motivated her eating, other than hunger and reports noticing that she often eats because of stress. Upon exploration of this she recognizes this helps to reduce stress in the short term, by distracting herself. This also increases guilt, shame and self-defeating behaviors, which in-turn increases overall stress level. Vannessa was able to generate a list of things that could replace eating for stress that would serve the same purpose of providing a distraction and also reduce overall stress. Vannessa reports feeling like she is making good progress with her physical pain. Her left knee is still bothersome but she got her first shot ans will likely feel relief soon. Overall she seems to be feeling more positive and optimistic.     Intervention: Active listening, reflections and validation. Prompted a brainstorm on things she could do to distract herself, which may also provide some stress relief. Praised  her awareness and ability to recognize how eating and stress are interconnected for her.  Talked about her current situation at work and her recognition that her job, especially recently, has been a significant source of stress. She plans to stay there through next May in order to keep health insurance until she gets  and can get on her fiance/husbands health insurance.     Current Stressors / Issues: Mental health symptoms, joint pain and limited mobility, eating/diet, work    Medication Review: Med change from Zoloft to Cymbalta - plans to talk with PCP at follow-up appointment, if this still does not seem to be a positive change.    Medication Compliance: Takes medications as prescribed    Changes in Health: Vannessa has had some relief from shots in her hips and one knee    Chemical Use Review:   Substance Use: No    Tobacco Use: No    ASSESSMENT: Current Emotional / Mental Status (status of significant symptoms):  Risk status no evidence of suicidal or homicidal ideation  Client denies current fears or concerns for personal safety.  A safety and risk management plan has not been developed at this time; however client was given the after-hours number should there be a change in any of these risk factors.    Appearance:                            Appropriate   Eye Contact:                           Good   Psychomotor Behavior:          Fidgety  Attitude:                                   Anxious  Orientation:                             All  Speech              Rate / Production:      Rapid speech, rambling              Volume:                       Normal   Mood:                                      Normal  Affect:                                      Flat  Thought Content:                    Clear   Thought Form:                        Focussed   Insight:                                     Good    Collateral Reports Completed: PHQ9 & GAD7    PLAN: Weekly therapy - this clinician is on vacation next week -  will resume weekly appointments after.        Pattie Bucio, [facility]

## 2021-10-13 NOTE — PROGRESS NOTES
Physical Therapy Discharge Note      Name: Vannessa Molina MRN# [id number]   Age: 46 year old YOB: 1975        Requesting provider: Dr. Suarez  Diagnosis: Bilateral hip pain, Trochanteric bursitis of both hips, Somatic dysfunction of spine affecting pelvic region  Prescription: Evaluate and treat  Frequency/duration: Therapists discretion  Services provided: The patient was seen for a total of 10 visits from 2/23/21 to 6/16/21.  See the previous documentation for treatment details.        Plan  Intervention:  The patient has not contacted the department since the previous visit.  For additional information regarding goals and status as of last, please refer to prior documentation.  It is uncertain if the patient has attained any further goals at this time.  The patient will be formally discharged from physical therapy care.  We will resume physical therapy services as per physician referral and discretion.     Follow up:  Recheck with physician as needed

## 2021-10-25 NOTE — PROGRESS NOTES
Assessment & Plan     Moderate episode of recurrent major depressive disorder (H)  Desires to go back to Zoloft.  Cymbalta stopped.  Will find out if was helping at all with pain - or if all relief has been from injections.  Continue counseling with Pattie - making good progress.  - sertraline (ZOLOFT) 100 MG tablet; Take 1 tablet (100 mg) by mouth daily    Morbid obesity (H)  Down 10 pounds.  Taking time for herself.  Strong cravings for sweets- binge eating.  Would like to try Vyvanse.  Discussed controlled substance nature, risk, side effect potential.  If continuing, will need to sign contract, do drug testing.  Discussed that would typically not use it long term.  - lisdexamfetamine (VYVANSE) 30 MG capsule; Take 1 capsule (30 mg) by mouth every morning    Binge eating  As above.  - lisdexamfetamine (VYVANSE) 30 MG capsule; Take 1 capsule (30 mg) by mouth every morning       Patient Instructions   Stop Cymbalta.  Restart Zoloft.    Trial of Vyvanse 30 mg daily for binge eating.    Continue counseling with Pattie.  Continue with Dr. Choudhary.      Return in about 1 month (around 11/29/2021) for depression/anxiety.    Aaliyah Sethi MD  [facility] - KINA Hurd is a 46 year old who presents for the following health issues     HPI     Depression and Anxiety Follow-Up    How are you doing with your depression since your last visit? Worsened     How are you doing with your anxiety since your last visit?  Worsened     Are you having other symptoms that might be associated with depression or anxiety? Yes:  same symptoms she had before she started zoloft long ago.    Have you had a significant life event? No     Do you have any concerns with your use of alcohol or other drugs? No     Counseling with Pattie - helps    Prior auth for Cymbalta - approved- dose increased to 30 mg BID 1 month ago    Prior Zoloft - worked better    neurontin tapered down - night time only now    Weight is  down 10 pounds - started seeing Dr Choudhary - low carb diet; continue metformin    Can't tell what is helping with pain - the Cymbalta or the injections    Irritability, short fuse    Cravings for food - always sweets    Social History     Tobacco Use     Smoking status: Former Smoker     Types: Cigarettes     Quit date: 2004     Years since quittin.9     Smokeless tobacco: Never Used     Tobacco comment: Tried to quit   Vaping Use     Vaping Use: Never used   Substance Use Topics     Alcohol use: Not Currently     Alcohol/week: 0.0 standard drinks     Comment: hasnt drank in a year     Drug use: No     PHQ 2021 10/6/2021 10/29/2021   PHQ-9 Total Score 10 12 11   Q9: Thoughts of better off dead/self-harm past 2 weeks Not at all Not at all Not at all     NIRAJ-7 SCORE 2021 10/6/2021 10/29/2021   Total Score - - -   Total Score 1 5 9     Last PHQ-9 10/29/2021   1.  Little interest or pleasure in doing things 2   2.  Feeling down, depressed, or hopeless 1   3.  Trouble falling or staying asleep, or sleeping too much 3   4.  Feeling tired or having little energy 1   5.  Poor appetite or overeating 3   6.  Feeling bad about yourself 1   7.  Trouble concentrating 0   8.  Moving slowly or restless 0   Q9: Thoughts of better off dead/self-harm past 2 weeks 0   PHQ-9 Total Score 11   Difficulty at work, home, or with people Somewhat difficult     NIRAJ-7  10/29/2021   1. Feeling nervous, anxious, or on edge 2   2. Not being able to stop or control worrying 2   3. Worrying too much about different things 1   4. Trouble relaxing 0   5. Being so restless that it is hard to sit still 0   6. Becoming easily annoyed or irritable 3   7. Feeling afraid, as if something awful might happen 1   NIRAJ-7 Total Score 9   If you checked any problems, how difficult have they made it for you to do your work, take care of things at home, or get along with other people? Somewhat difficult       Suicide Assessment Five-step  Evaluation and Treatment (SAFE-T)      Pain History:  When did you first notice your pain? - More than 6 weeks   Have you seen this provider for your pain in the past?   Yes   Where in your body do you have pain? Knee left   Are you seeing anyone else for your pain? Yes - dr umaña  at [facility]     PHQ-9 SCORE 9/9/2021 10/6/2021 10/29/2021   PHQ-9 Total Score MyChart - - -   PHQ-9 Total Score 10 12 11       NIRAJ-7 SCORE 9/9/2021 10/6/2021 10/29/2021   Total Score - - -   Total Score 1 5 9         Chronic Pain Follow Up:    Location of pain: left knee   Analgesia/pain control:    - Recent changes:  After last injection- gotten better    - Overall control: Tolerable with discomfort    - Current treatments: cymbalta and ibuprofen    Adherence:     - Do you ever take more pain medicine than prescribed? No       PDMP Review       Value Time User    State PDMP site checked  Yes 8/2/2021  3:49 PM Fabrice Martinez PA-C        Last CSA Agreement:   Patient-Level CSA:    There are no patient-level csa.       Last UDS:       Review of Systems   Constitutional, HEENT, cardiovascular, pulmonary, gi and gu systems are negative, except as otherwise noted.      Objective    /82 (BP Location: Left arm, Patient Position: Chair, Cuff Size: Adult Large)   Pulse 91   Temp 97.9  F (36.6  C) (Tympanic)   Resp 16   Wt 122 kg (269 lb)   SpO2 100%   BMI 42.13 kg/m    Body mass index is 42.13 kg/m .  Physical Exam   GENERAL: alert, no distress and over weight  RESP: lungs clear to auscultation - no rales, rhonchi or wheezes  CV: regular rate and rhythm, normal S1 S2, no S3 or S4, no murmur, click or rub, no peripheral edema and peripheral pulses strong  MS: normal muscle tone and ambulating without cane today; movement in general improved  PSYCH: mentation appears normal, affect normal/bright

## 2021-10-29 ENCOUNTER — OFFICE VISIT (OUTPATIENT)
Dept: PSYCHOLOGY | Facility: OTHER | Age: 46
End: 2021-10-29
Attending: COUNSELOR
Payer: COMMERCIAL

## 2021-10-29 ENCOUNTER — OFFICE VISIT (OUTPATIENT)
Dept: FAMILY MEDICINE | Facility: OTHER | Age: 46
End: 2021-10-29
Attending: FAMILY MEDICINE
Payer: COMMERCIAL

## 2021-10-29 VITALS
SYSTOLIC BLOOD PRESSURE: 120 MMHG | WEIGHT: 269 LBS | HEART RATE: 91 BPM | DIASTOLIC BLOOD PRESSURE: 82 MMHG | BODY MASS INDEX: 42.13 KG/M2 | OXYGEN SATURATION: 100 % | TEMPERATURE: 97.9 F | RESPIRATION RATE: 16 BRPM

## 2021-10-29 DIAGNOSIS — F33.1 MODERATE EPISODE OF RECURRENT MAJOR DEPRESSIVE DISORDER (H): Primary | ICD-10-CM

## 2021-10-29 DIAGNOSIS — F50.811 BINGE-EATING DISORDER, MODERATE: ICD-10-CM

## 2021-10-29 DIAGNOSIS — F41.1 GAD (GENERALIZED ANXIETY DISORDER): ICD-10-CM

## 2021-10-29 DIAGNOSIS — R63.2 BINGE EATING: ICD-10-CM

## 2021-10-29 DIAGNOSIS — E66.01 MORBID OBESITY (H): ICD-10-CM

## 2021-10-29 PROCEDURE — 90837 PSYTX W PT 60 MINUTES: CPT | Performed by: COUNSELOR

## 2021-10-29 PROCEDURE — 99214 OFFICE O/P EST MOD 30 MIN: CPT | Performed by: FAMILY MEDICINE

## 2021-10-29 RX ORDER — SERTRALINE HYDROCHLORIDE 100 MG/1
100 TABLET, FILM COATED ORAL DAILY
Qty: 90 TABLET | Refills: 1 | Status: SHIPPED | OUTPATIENT
Start: 2021-10-29 | End: 2022-02-04

## 2021-10-29 RX ORDER — LISDEXAMFETAMINE DIMESYLATE 30 MG/1
30 CAPSULE ORAL EVERY MORNING
Qty: 30 CAPSULE | Refills: 0 | Status: SHIPPED | OUTPATIENT
Start: 2021-10-29 | End: 2022-02-16

## 2021-10-29 ASSESSMENT — ANXIETY QUESTIONNAIRES
1. FEELING NERVOUS, ANXIOUS, OR ON EDGE: MORE THAN HALF THE DAYS
7. FEELING AFRAID AS IF SOMETHING AWFUL MIGHT HAPPEN: SEVERAL DAYS
3. WORRYING TOO MUCH ABOUT DIFFERENT THINGS: SEVERAL DAYS
3. WORRYING TOO MUCH ABOUT DIFFERENT THINGS: SEVERAL DAYS
7. FEELING AFRAID AS IF SOMETHING AWFUL MIGHT HAPPEN: SEVERAL DAYS
1. FEELING NERVOUS, ANXIOUS, OR ON EDGE: SEVERAL DAYS
4. TROUBLE RELAXING: NOT AT ALL
GAD7 TOTAL SCORE: 9
IF YOU CHECKED OFF ANY PROBLEMS ON THIS QUESTIONNAIRE, HOW DIFFICULT HAVE THESE PROBLEMS MADE IT FOR YOU TO DO YOUR WORK, TAKE CARE OF THINGS AT HOME, OR GET ALONG WITH OTHER PEOPLE: SOMEWHAT DIFFICULT
GAD7 TOTAL SCORE: 7
6. BECOMING EASILY ANNOYED OR IRRITABLE: NEARLY EVERY DAY
5. BEING SO RESTLESS THAT IT IS HARD TO SIT STILL: NOT AT ALL
IF YOU CHECKED OFF ANY PROBLEMS ON THIS QUESTIONNAIRE, HOW DIFFICULT HAVE THESE PROBLEMS MADE IT FOR YOU TO DO YOUR WORK, TAKE CARE OF THINGS AT HOME, OR GET ALONG WITH OTHER PEOPLE: SOMEWHAT DIFFICULT
2. NOT BEING ABLE TO STOP OR CONTROL WORRYING: SEVERAL DAYS
2. NOT BEING ABLE TO STOP OR CONTROL WORRYING: MORE THAN HALF THE DAYS
6. BECOMING EASILY ANNOYED OR IRRITABLE: NEARLY EVERY DAY
5. BEING SO RESTLESS THAT IT IS HARD TO SIT STILL: NOT AT ALL

## 2021-10-29 ASSESSMENT — PATIENT HEALTH QUESTIONNAIRE - PHQ9
SUM OF ALL RESPONSES TO PHQ QUESTIONS 1-9: 10
5. POOR APPETITE OR OVEREATING: NOT AT ALL
SUM OF ALL RESPONSES TO PHQ QUESTIONS 1-9: 11

## 2021-10-29 ASSESSMENT — PAIN SCALES - GENERAL: PAINLEVEL: MILD PAIN (2)
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
## 2021-10-29 NOTE — PATIENT INSTRUCTIONS
Stop Cymbalta.  Restart Zoloft.    Trial of Vyvanse 30 mg daily for binge eating.    Continue counseling with Pattie.  Continue with Dr. Choudhary.

## 2021-10-29 NOTE — NURSING NOTE
"Chief Complaint   Patient presents with     Depression       Initial /82 (BP Location: Left arm, Patient Position: Chair, Cuff Size: Adult Large)   Pulse 91   Temp 97.9  F (36.6  C) (Tympanic)   Resp 16   Wt 122 kg (269 lb)   SpO2 100%   BMI 42.13 kg/m   Estimated body mass index is 42.13 kg/m  as calculated from the following:    Height as of 9/9/21: 1.702 m (5' 7\").    Weight as of this encounter: 122 kg (269 lb).  Medication Reconciliation: complete  Vianca Flores LPN    "

## 2021-10-30 ASSESSMENT — ANXIETY QUESTIONNAIRES
GAD7 TOTAL SCORE: 9
GAD7 TOTAL SCORE: 7

## 2021-11-05 ENCOUNTER — OFFICE VISIT (OUTPATIENT)
Dept: PSYCHOLOGY | Facility: OTHER | Age: 46
End: 2021-11-05
Attending: COUNSELOR
Payer: COMMERCIAL

## 2021-11-05 DIAGNOSIS — F33.1 MODERATE EPISODE OF RECURRENT MAJOR DEPRESSIVE DISORDER (H): ICD-10-CM

## 2021-11-05 DIAGNOSIS — F50.811 BINGE-EATING DISORDER, MODERATE: Primary | ICD-10-CM

## 2021-11-05 PROCEDURE — 90837 PSYTX W PT 60 MINUTES: CPT | Performed by: COUNSELOR

## 2021-11-08 NOTE — PROGRESS NOTES
The author of this note documented a reason for not sharing it with the patient.    Counseling Progress Note    Client Name: Vannessa Molina    Date of Service (when I saw the patient): 11/05/2021    Service Type: Individual Psychotherapy    Session Start Time:  3:00 pm  Session End Time: 4:00 pm  Session Length: I spent 53+ minutes performing psychotherapy during this office visit.  Session Number: 20    DSM5 Diagnoses: 300.02 (F41.1) Generalized Anxiety Disorder; 307.51 (F50.8) Binge-Eating Disorder, Severity: Moderate    Attendees: Client     Health Maintenance Topics with due status: Due Soon       Topic Date Due    NIRAJ ASSESSMENT 02/17/2021       Treatment Plan Due Date: 05/12/2021  Diagnostic Assessment Due Date: 2/2/2022    DATA    Treatment Objective(s) Addressed in This Session: Improve self-acceptance, feeling content, improve ability to feel / experience emotions, reduce self-defeating behaviors - binge eating    Progress on / Status of Treatment Objective(s) / Homework: Vannessa shared a list she has been working on of the worries she has been struggling with. As she was working through this list during today's session she would state a problem and then her solution or progress she is already making.     Intervention: Active listening, reflections and validation. Worked through the list of worries she made over the past week. Explored her core beliefs about herself and also related to her binge eating. This lead to discussion on the function of her binge eating behavior. Vannessa seemed to agree that binge eating may serve as a distraction or mask from strong emotions. Processed an example at work where she was extremely frustrated and angry during a staff meeting. She stated she ate two donuts without really noticing until after. Her focus then shifted to shame and guilt around eating and even though this did not help her to feel better it diverts her attention from initial feelings. Vannessa plans to pay close  attention to this over the next week until her next session. Vannessa talked about setting attainable goals for weight loss. Talked about setting goals on her behavior vs. results goals. An example was to have a goal to stop eating when she is satisfied vs. having a goal to loose a certain number of pounds.     Current Stressors / Issues: Mental health symptoms, joint pain and limited mobility, eating/diet, work    Medication Review: Vannessa saw her PCP who made a change to her medications. She is no longer taking Cymbalta, re-stated Zoloft and began Vyvanse. Vyvanse was newly prescribed to support her with efforts to reduce/resolve binge eating.     Medication Compliance: Takes medications as prescribed    Changes in Health: Vannessa has had some relief from shots in her hips and one knee    Chemical Use Review:   Substance Use: No    Tobacco Use: No    ASSESSMENT: Current Emotional / Mental Status (status of significant symptoms):  Risk status no evidence of suicidal or homicidal ideation  Client denies current fears or concerns for personal safety.  A safety and risk management plan has not been developed at this time; however client was given the after-hours number should there be a change in any of these risk factors.    Appearance:                            Appropriate   Eye Contact:                           Good   Psychomotor Behavior:          Fidgety  Attitude:                                   Anxious, worried  Orientation:                             All  Speech              Rate / Production:      Rapid speech, rambling              Volume:                       Normal   Mood:                                      Normal  Affect:                                      Restless  Thought Content:                    Clear   Thought Form:                        Scattered  Insight:                                     Good    Collateral Reports Completed: PHQ9 & GAD7    PLAN: Weekly therapy, continue working on treatment  plan goals.        [facility]

## 2021-11-11 DIAGNOSIS — M25.50 ARTHRALGIA, UNSPECIFIED JOINT: ICD-10-CM

## 2021-11-12 RX ORDER — GABAPENTIN 300 MG/1
CAPSULE ORAL
Qty: 90 CAPSULE | Refills: 0 | Status: SHIPPED | OUTPATIENT
Start: 2021-11-12 | End: 2021-12-09

## 2021-11-12 NOTE — TELEPHONE ENCOUNTER
gabapentin      Last Written Prescription Date:  8/2621  Last Fill Quantity: 90,   # refills: 1  Last Office Visit: 10/29/21  Future Office visit:    Next 5 appointments (look out 90 days)    Nov 16, 2021  2:00 PM  (Arrive by 1:45 PM)  [facility]  [facility] [place] ([facility] [place] ) [address] [place] [address]  [place] [id number] [phone]   Nov 29, 2021  3:30 PM  (Arrive by 3:15 PM)  SHORT with Aaliyah Suarez MD  [facility] [place] ([facility] [place] ) [address]  [place] [address]  [phone]

## 2021-11-16 ENCOUNTER — OFFICE VISIT (OUTPATIENT)
Dept: PSYCHOLOGY | Facility: OTHER | Age: 46
End: 2021-11-16
Attending: COUNSELOR
Payer: COMMERCIAL

## 2021-11-16 DIAGNOSIS — F41.1 GAD (GENERALIZED ANXIETY DISORDER): ICD-10-CM

## 2021-11-16 DIAGNOSIS — F50.811 BINGE-EATING DISORDER, MODERATE: Primary | ICD-10-CM

## 2021-11-16 PROCEDURE — 90837 PSYTX W PT 60 MINUTES: CPT | Performed by: COUNSELOR
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
## 2021-11-29 ENCOUNTER — OFFICE VISIT (OUTPATIENT)
Dept: FAMILY MEDICINE | Facility: OTHER | Age: 46
End: 2021-11-29
Attending: FAMILY MEDICINE
Payer: COMMERCIAL

## 2021-11-29 VITALS
DIASTOLIC BLOOD PRESSURE: 84 MMHG | SYSTOLIC BLOOD PRESSURE: 128 MMHG | BODY MASS INDEX: 42.6 KG/M2 | HEART RATE: 76 BPM | WEIGHT: 272 LBS | RESPIRATION RATE: 18 BRPM | OXYGEN SATURATION: 100 % | TEMPERATURE: 98.7 F

## 2021-11-29 DIAGNOSIS — F41.9 ANXIETY: Primary | ICD-10-CM

## 2021-11-29 DIAGNOSIS — M16.10 HIP ARTHRITIS: ICD-10-CM

## 2021-11-29 DIAGNOSIS — R63.2 BINGE EATING: ICD-10-CM

## 2021-11-29 DIAGNOSIS — Z79.899 CONTROLLED SUBSTANCE AGREEMENT SIGNED: ICD-10-CM

## 2021-11-29 DIAGNOSIS — G89.29 OTHER CHRONIC PAIN: ICD-10-CM

## 2021-11-29 LAB — CREAT UR-MCNC: 54 MG/DL

## 2021-11-29 PROCEDURE — 99214 OFFICE O/P EST MOD 30 MIN: CPT | Performed by: FAMILY MEDICINE

## 2021-11-29 PROCEDURE — 80307 DRUG TEST PRSMV CHEM ANLYZR: CPT | Performed by: FAMILY MEDICINE

## 2021-11-29 RX ORDER — LISDEXAMFETAMINE DIMESYLATE 30 MG/1
30 CAPSULE ORAL DAILY
Qty: 30 CAPSULE | Refills: 0 | Status: SHIPPED | OUTPATIENT
Start: 2021-12-30 | End: 2022-01-29

## 2021-11-29 RX ORDER — LISDEXAMFETAMINE DIMESYLATE 30 MG/1
30 CAPSULE ORAL DAILY
Qty: 30 CAPSULE | Refills: 0 | Status: SHIPPED | OUTPATIENT
Start: 2021-11-29 | End: 2021-12-29

## 2021-11-29 RX ORDER — LISDEXAMFETAMINE DIMESYLATE 30 MG/1
30 CAPSULE ORAL DAILY
Qty: 30 CAPSULE | Refills: 0 | Status: SHIPPED | OUTPATIENT
Start: 2022-01-30 | End: 2022-03-01

## 2021-11-29 ASSESSMENT — ANXIETY QUESTIONNAIRES
GAD7 TOTAL SCORE: 5
6. BECOMING EASILY ANNOYED OR IRRITABLE: SEVERAL DAYS
4. TROUBLE RELAXING: NOT AT ALL
5. BEING SO RESTLESS THAT IT IS HARD TO SIT STILL: NOT AT ALL
2. NOT BEING ABLE TO STOP OR CONTROL WORRYING: SEVERAL DAYS
7. FEELING AFRAID AS IF SOMETHING AWFUL MIGHT HAPPEN: SEVERAL DAYS
1. FEELING NERVOUS, ANXIOUS, OR ON EDGE: SEVERAL DAYS
IF YOU CHECKED OFF ANY PROBLEMS ON THIS QUESTIONNAIRE, HOW DIFFICULT HAVE THESE PROBLEMS MADE IT FOR YOU TO DO YOUR WORK, TAKE CARE OF THINGS AT HOME, OR GET ALONG WITH OTHER PEOPLE: SOMEWHAT DIFFICULT
3. WORRYING TOO MUCH ABOUT DIFFERENT THINGS: SEVERAL DAYS

## 2021-11-29 ASSESSMENT — PAIN SCALES - GENERAL: PAINLEVEL: MODERATE PAIN (5)

## 2021-11-29 NOTE — PATIENT INSTRUCTIONS
Gradually increase Neurontin - monitor for any sedation and improvement in pain.  300/300/900 x few days  600/600/900 x few days  900/900/900     If not successful - then will try Lyrica if covered.    Continue Zoloft current dose.    Refill Vyvanse.     Continue counseling.

## 2021-11-29 NOTE — LETTER
[facility]  11/29/21  Patient: Vannessa Molina  YOB: 1975  Medical Record Number: [id number]                                                                                  Non-Opioid Controlled Substance Agreement    This is an agreement between you and your provider regarding safe and appropriate use of controlled substances prescribed by your care team. Controlled substances are?medicines that can cause physical and mental dependence (abuse).     There are strict laws about having and using these medicines. We here at [facility] are  committed to working with you in your efforts to get better. To support you in this work, we'll help you schedule regular office appointments for medicine refills. If we must cancel or change your appointment for any reason, we'll make sure you have enough medicine to last until your next appointment.     As a Provider, I will:     Listen carefully to your concerns while treating you with respect.     Recommend a treatment plan that I believe is in your best interest and may involve therapies other than medicine.      Talk with you often about the possible benefits and the risk of harm of any medicine that we prescribe for you.    Assess the safety of this medicine and check how well it works.      Provide a plan on how to taper (discontinue or go off) using this medicine if the decision is made to stop its use.      ::  As a Patient, I understand controlled substances:       Are prescribed by my care provider to help me function or work and manage my condition(s).?    Are strong medicines and can cause serious side effects.       Need to be taken exactly as prescribed.?Combining controlled substances with certain medicines or chemicals (such as illegal drugs, alcohol, sedatives, sleeping pills, and benzodiazepines) can be dangerous or even fatal.? If I stop taking my medicines suddenly, I may have severe withdrawal symptoms.     The risks, benefits, and  side effects of these medicine(s) were explained to me. I agree that:    1. I will take part in other treatments as advised by my care team. This may be psychiatry or counseling, physical therapy, behavioral therapy, group treatment or a referral to specialist.    2. I will keep all my appointments and understand this is part of the monitoring of controlled substances.?My care team may require an office visit for EVERY controlled substance refill. If I miss appointments or don t follow instructions, my care team may stop my medicine    3. I will take my medicines as prescribed. I will not change the dose or schedule unless my care team tells me to. There will be no refills if I run out early.      4. I may be asked to come to the clinic and complete a urine drug test or complete a pill count. If I don t give a urine sample or participate in a pill count, the care team may stop my medicine.    5. I will only receive controlled substance prescriptions from this clinic. If I am treated by another provider, I will tell them that I am taking controlled substances and that I have a treatment agreement with this provider. I will inform my [facility] care team within one business day if I am given a prescription for any controlled substance by another healthcare provider. My [facility] care team can contact other providers and pharmacists about my use of any medicines.    6. It is up to me to make sure that I don't run out of my medicines on weekends or holidays.?If my care team is willing to refill my prescription without a visit, I must request refills only during office hours. Refills may take up to 3 business days to process. I will use one pharmacy to fill all my controlled substance prescriptions. I will notify the clinic about any changes to my insurance or medicine availability.    7. I am responsible for my prescriptions. If the medicine/prescription is lost, stolen or destroyed, it will not be  replaced.?I also agree not to share controlled substance medicines with anyone.     8. I am aware I should not use any illegal or recreational drugs. I agree not to drink alcohol unless my care team says I can.     9. If I enroll in the [place] Medical Cannabis program, I will tell my care team before my next refill.    10. I will tell my care team right away if I become pregnant, have a new medical problem treated outside of my regular clinic, or have a change in my medicines.     11. I understand that this medicine can affect my thinking, judgment and reaction time.? Alcohol and drugs affect the brain and body, which can affect the safety of my driving. Being under the influence of alcohol or drugs can affect my decision-making, behaviors, personal safety and the safety of others. Driving while impaired (DWI) can occur if a person is driving, operating or in physical control of a car, motorcycle, boat, snowmobile, ATV, motorbike, off-road vehicle or any other motor vehicle ([place] Statute 169A.20). I understand the risk if I choose to drive or operate any vehicle or machinery.    I understand that if I do not follow any of the conditions above, my prescriptions or treatment may be stopped or changed.   I agree that my provider, clinic care team and pharmacy may work with any city, state or federal law enforcement agency that investigates the misuse, sale or other diversion of my controlled medicine. I will allow my provider to discuss my care with, or share a copy of, this agreement with any other treating provider, pharmacy or emergency room where I receive care.     I have read this agreement and have asked questions about anything I did not understand.    ________________________________________________________  Patient Signature - Vannessa Molina     ___________________                   Date     ________________________________________________________  Provider Signature - Aaliyah Sethi MD        ___________________                   Date     ________________________________________________________  Witness Signature (required if provider not present while patient signing)          ___________________                   Date

## 2021-11-29 NOTE — NURSING NOTE
"Chief Complaint   Patient presents with     Depression     Anxiety       Initial /84 (BP Location: Right arm, Patient Position: Sitting, Cuff Size: Adult Large)   Pulse 76   Temp 98.7  F (37.1  C) (Tympanic)   Resp 18   Wt 123.4 kg (272 lb)   LMP 01/08/2021   SpO2 100%   BMI 42.60 kg/m   Estimated body mass index is 42.6 kg/m  as calculated from the following:    Height as of 9/9/21: 1.702 m (5' 7\").    Weight as of this encounter: 123.4 kg (272 lb).  Medication Reconciliation: complete  Raphael Shell LPN  "

## 2021-11-30 ASSESSMENT — PATIENT HEALTH QUESTIONNAIRE - PHQ9: SUM OF ALL RESPONSES TO PHQ QUESTIONS 1-9: 11

## 2021-11-30 ASSESSMENT — ANXIETY QUESTIONNAIRES: GAD7 TOTAL SCORE: 5

## 2021-12-02 LAB
AMPHET UR CFM-MCNC: 2780 NG/ML
AMPHET/CREAT UR: 5148 NG/MG {CREAT}
GABAPENTIN UR QL CFM: PRESENT

## 2021-12-08 DIAGNOSIS — M25.50 ARTHRALGIA, UNSPECIFIED JOINT: ICD-10-CM

## 2021-12-09 RX ORDER — GABAPENTIN 300 MG/1
CAPSULE ORAL
Qty: 90 CAPSULE | Refills: 0 | Status: SHIPPED | OUTPATIENT
Start: 2021-12-09 | End: 2022-01-03

## 2021-12-09 NOTE — TELEPHONE ENCOUNTER
gabapentin (NEURONTIN) 300 MG capsule      Last Written Prescription Date:  11-12-21  Last Fill Quantity: 90,   # refills: 0  Last Office Visit: 11-29-21  Future Office visit:    Next 5 appointments (look out 90 days)    Feb 28, 2022  9:15 AM  (Arrive by 9:00 AM)  SHORT with Aaliyah Suarez MD  [facility] [place] ([facility] [place] ) [address]  [place] [address]  [phone]

## 2021-12-31 DIAGNOSIS — M25.50 ARTHRALGIA, UNSPECIFIED JOINT: ICD-10-CM

## 2022-01-01 NOTE — TELEPHONE ENCOUNTER
NEURONTIN      Last Written Prescription Date:  12-9-2021  Last Fill Quantity: 90,   # refills: 0  Last Office Visit: 11-  Future Office visit:    Next 5 appointments (look out 90 days)    Jan 07, 2022  2:00 PM  (Arrive by 1:45 PM)  [facility]  [facility] [place] ([facility] [place] ) [address] [place] [id number] [phone]   Feb 28, 2022  9:15 AM  (Arrive by 9:00 AM)  SHORT [facility]  [facility] [place] [facility] [address] [facility] [address]  [place] [address]  [phone]           Routing refill request to provider for review/approval because:  Drug not on the [facility], [facility] or  Health refill protocol or controlled substance

## 2022-01-03 RX ORDER — GABAPENTIN 300 MG/1
CAPSULE ORAL
Qty: 90 CAPSULE | Refills: 0 | Status: SHIPPED | OUTPATIENT
Start: 2022-01-03 | End: 2022-01-26

## 2022-01-05 ENCOUNTER — TRANSFERRED RECORDS (OUTPATIENT)
Dept: HEALTH INFORMATION MANAGEMENT | Facility: CLINIC | Age: 47
End: 2022-01-05
Payer: COMMERCIAL

## 2022-01-17 ENCOUNTER — MYC MEDICAL ADVICE (OUTPATIENT)
Dept: FAMILY MEDICINE | Facility: OTHER | Age: 47
End: 2022-01-17
Payer: COMMERCIAL

## 2022-01-21 ENCOUNTER — OFFICE VISIT (OUTPATIENT)
Dept: PSYCHOLOGY | Facility: OTHER | Age: 47
End: 2022-01-21
Attending: COUNSELOR
Payer: COMMERCIAL

## 2022-01-21 DIAGNOSIS — F50.811 BINGE-EATING DISORDER, MODERATE: ICD-10-CM

## 2022-01-21 DIAGNOSIS — F41.1 GAD (GENERALIZED ANXIETY DISORDER): Primary | ICD-10-CM

## 2022-01-21 DIAGNOSIS — F33.9 MAJOR DEPRESSIVE DISORDER, RECURRENT EPISODE (H): ICD-10-CM

## 2022-01-21 PROCEDURE — 90837 PSYTX W PT 60 MINUTES: CPT | Performed by: COUNSELOR

## 2022-01-21 ASSESSMENT — ANXIETY QUESTIONNAIRES
1. FEELING NERVOUS, ANXIOUS, OR ON EDGE: NOT AT ALL
IF YOU CHECKED OFF ANY PROBLEMS ON THIS QUESTIONNAIRE, HOW DIFFICULT HAVE THESE PROBLEMS MADE IT FOR YOU TO DO YOUR WORK, TAKE CARE OF THINGS AT HOME, OR GET ALONG WITH OTHER PEOPLE: NOT DIFFICULT AT ALL
3. WORRYING TOO MUCH ABOUT DIFFERENT THINGS: NOT AT ALL
GAD7 TOTAL SCORE: 2
7. FEELING AFRAID AS IF SOMETHING AWFUL MIGHT HAPPEN: SEVERAL DAYS
2. NOT BEING ABLE TO STOP OR CONTROL WORRYING: NOT AT ALL
6. BECOMING EASILY ANNOYED OR IRRITABLE: SEVERAL DAYS
5. BEING SO RESTLESS THAT IT IS HARD TO SIT STILL: NOT AT ALL

## 2022-01-21 ASSESSMENT — PATIENT HEALTH QUESTIONNAIRE - PHQ9
SUM OF ALL RESPONSES TO PHQ QUESTIONS 1-9: 10
5. POOR APPETITE OR OVEREATING: NOT AT ALL

## 2022-01-21 NOTE — PROGRESS NOTES
The author of this note documented a reason for not sharing it with the patient.    Counseling Progress Note    Client Name: Vannessa Molina    Date of Service (when I saw the patient): 01/21/2022    Service Type: Individual Psychotherapy    Session Start Time:  2:30 pm  Session End Time: 3:30 pm  Session Length: I spent 53+ minutes performing psychotherapy during this office visit.  Session Number: 22    DSM5 Diagnoses: 300.02 (F41.1) Generalized Anxiety Disorder; 307.51 (F50.8) Binge-Eating Disorder, Severity: Moderate    Attendees: Client     Health Maintenance Topics with due status: Due Soon       Topic Date Due    NIRAJ ASSESSMENT 02/17/2021       Treatment Plan Due Date: 05/12/2021  Diagnostic Assessment Due Date: 2/2/2022    DATA    Treatment Objective(s) Addressed in This Session: Improve self-acceptance, feeling content, improve ability to feel / experience emotions, reduce self-defeating behaviors - binge eating    Progress on / Status of Treatment Objective(s) / Homework: Vannessa reports a plan to have both of her hips replaced starting with the first surgery on February 14. She says she is feeling scared, excited, nervous, etc. Her surgeon has advised her to try to loose at least 10 pounds before surgery to help reduce some heightened risk due to her obesity. She has started to work on this goal, reporting a loss of 4 pounds and then gaining 2 back. She started out by not eating much at all which is likely why she went up and down. She now has a plan to focus on eating three health meals a day and avoid unhealthy snacking. She talked about having the support of her family and feeling this makes a difference. Vannessa also says she has not had struggles with binge eating in quite some time. She feels this may have to do with how much is at stake at this time. Vannessa's pain level and mobility have been very difficult to manage. These surgeries are giving her much needed hope for those things to get better. Vannessa also  talked today about making huge gains with being able to ask for and allow others to help and support her. She shared several examples of her family helping without her having to ask and how great this feels to her. Explored the idea of vulnerability as a weakness or strength.     Intervention: Active listening, processing, open questions, reflections.     Current Stressors / Issues: Mental health symptoms, joint pain and limited mobility, eating/diet, work    Medication Review: Noticed more focussed behavior when she first started taking Vyvance. She states she does not notice anything now but as she thought about it, she notes a reduction in Binge eating.    Medication Compliance: Takes medications as prescribed    Changes in Health: Vannessa is scheduled to have both of her hips replaced. The first surgery is scheduled for 2/14.     Chemical Use Review:   Substance Use: No    Tobacco Use: No    ASSESSMENT: Current Emotional / Mental Status (status of significant symptoms):  Risk status no evidence of suicidal or homicidal ideation  Client denies current fears or concerns for personal safety.  A safety and risk management plan has not been developed at this time; however client was given the after-hours number should there be a change in any of these risk factors.    Appearance:                            Appropriate   Eye Contact:                           Good   Psychomotor Behavior:          Fidgety  Attitude:                                   Anxious, worried  Orientation:                             All  Speech              Rate / Production:      Rapid speech, rambling              Volume:                       Normal   Mood:                                      Normal  Affect:                                      Restless  Thought Content:                    Clear   Thought Form:                        Scattered  Insight:                                     Good    Collateral Reports Completed: PHQ9 &  GAD7    PLAN: Weekly therapy, Work on development of a care plan to manage worry, anxiety, etc. Going into and after her surgery.        [facility], [facility]

## 2022-01-22 ASSESSMENT — ANXIETY QUESTIONNAIRES: GAD7 TOTAL SCORE: 2

## 2022-01-24 ENCOUNTER — TRANSFERRED RECORDS (OUTPATIENT)
Dept: HEALTH INFORMATION MANAGEMENT | Facility: CLINIC | Age: 47
End: 2022-01-24
Payer: COMMERCIAL

## 2022-01-24 LAB
CREATININE (EXTERNAL): 0.67 MG/DL (ref 0.4–1)
GFR ESTIMATED (EXTERNAL): >60 ML/MIN/1.73M2
POTASSIUM (EXTERNAL): 4.5 MEQ/L (ref 3.4–5.1)

## 2022-01-26 DIAGNOSIS — M25.50 ARTHRALGIA, UNSPECIFIED JOINT: ICD-10-CM

## 2022-01-26 RX ORDER — GABAPENTIN 300 MG/1
CAPSULE ORAL
Qty: 90 CAPSULE | Refills: 0 | Status: SHIPPED | OUTPATIENT
Start: 2022-01-26 | End: 2022-02-16

## 2022-01-26 NOTE — TELEPHONE ENCOUNTER
gabapentin      Last Written Prescription Date:  1/3/22  Last Fill Quantity: 90,   # refills: 0  Last Office Visit: 11/29/21  Future Office visit:    Next 5 appointments (look out 90 days)    Jan 28, 2022  1:30 PM  (Arrive by 1:15 PM)  Return Visit with Pattie Bucio [facility] [place] ([facility] [place] ) [address] [place] [address]  [place] [address]  [phone]   Feb 28, 2022  9:15 AM  (Arrive by 9:00 AM)  SHORT with Aaliyah Suarez MD  [facility] [place] ([facility] [place] ) [address]  [place] [address]  [phone]   Apr 22, 2022 10:15 AM  (Arrive by 10:00 AM)  PHYSICAL with Aaliyah Suarez MD  [facility] [place] ([facility] [place] ) [address]  [place] [address]  [phone]

## 2022-01-31 ENCOUNTER — TRANSFERRED RECORDS (OUTPATIENT)
Dept: HEALTH INFORMATION MANAGEMENT | Facility: CLINIC | Age: 47
End: 2022-01-31
Payer: COMMERCIAL

## 2022-01-31 LAB — EJECTION FRACTION: NORMAL %

## 2022-02-09 ENCOUNTER — OFFICE VISIT (OUTPATIENT)
Dept: PSYCHOLOGY | Facility: OTHER | Age: 47
End: 2022-02-09
Attending: COUNSELOR
Payer: COMMERCIAL

## 2022-02-09 DIAGNOSIS — F33.1 MODERATE EPISODE OF RECURRENT MAJOR DEPRESSIVE DISORDER (H): Primary | ICD-10-CM

## 2022-02-09 DIAGNOSIS — F50.811 BINGE-EATING DISORDER, MODERATE: ICD-10-CM

## 2022-02-09 PROCEDURE — 90837 PSYTX W PT 60 MINUTES: CPT | Performed by: COUNSELOR

## 2022-02-09 ASSESSMENT — ANXIETY QUESTIONNAIRES
6. BECOMING EASILY ANNOYED OR IRRITABLE: MORE THAN HALF THE DAYS
1. FEELING NERVOUS, ANXIOUS, OR ON EDGE: NOT AT ALL
5. BEING SO RESTLESS THAT IT IS HARD TO SIT STILL: NOT AT ALL
7. FEELING AFRAID AS IF SOMETHING AWFUL MIGHT HAPPEN: MORE THAN HALF THE DAYS
GAD7 TOTAL SCORE: 5
3. WORRYING TOO MUCH ABOUT DIFFERENT THINGS: SEVERAL DAYS
2. NOT BEING ABLE TO STOP OR CONTROL WORRYING: NOT AT ALL
IF YOU CHECKED OFF ANY PROBLEMS ON THIS QUESTIONNAIRE, HOW DIFFICULT HAVE THESE PROBLEMS MADE IT FOR YOU TO DO YOUR WORK, TAKE CARE OF THINGS AT HOME, OR GET ALONG WITH OTHER PEOPLE: SOMEWHAT DIFFICULT

## 2022-02-09 ASSESSMENT — PATIENT HEALTH QUESTIONNAIRE - PHQ9
SUM OF ALL RESPONSES TO PHQ QUESTIONS 1-9: 9
5. POOR APPETITE OR OVEREATING: NOT AT ALL

## 2022-02-09 NOTE — PROGRESS NOTES
"The author of this note documented a reason for not sharing it with the patient.    Counseling Progress Note    Client Name: Vannessa Molina    Date of Service (when I saw the patient): 02/09/2022    Service Type: Individual Psychotherapy    Session Start Time:  09:50 am  Session End Time: 11:00 am  Session Length: I spent 53+ minutes performing psychotherapy during this office visit.  Session Number: 23    DSM5 Diagnoses: 300.02 (F41.1) Generalized Anxiety Disorder; 307.51 (F50.8) Binge-Eating Disorder, Severity: Moderate    Attendees: Client     Health Maintenance Topics with due status: Due Soon       Topic Date Due    NIRAJ ASSESSMENT 02/17/2021       Treatment Plan Due Date: 05/12/2021  Diagnostic Assessment Due Date: 2/2/2022    DATA    Treatment Objective(s) Addressed in This Session: Improve self-acceptance, feeling content, improve ability to feel / experience emotions, reduce self-defeating behaviors - including but not limited to binge eating    Progress on / Status of Treatment Objective(s) / Homework: Vannessa shared that she worked a midnight before our last scheduled appointment and she slept right through it. Talked about her upcoming surgeries, how she is preparing and what fears or concerns she has about it. Vannessa identified her two main concerns: fear that she will go back to binge eating and worry about feeling like a burden to others. In talking through each of these concerns Vannessa recognized two areas of focus, which may help guide her in this journey, these are: self-care & one day at a time. Vannessa reports having a ton of support from family, friends and co-workers. This support feels both strange/difficult and good. She expressed worry that she will be hard on herself if she is not recovering in the time she \"should\". Asked about what she has learned about recovery - Vannessa said it is all individual & then made the connection that any judgement of her recovery will come from her own expectations and that " she plans to challenge those thoughts by reminding herself to take things a day at a time. Vannessa talked about things she plans to do while recovering. Discussion on having things for pure enjoyment or fun and also tasks she can do that will help her to feel accomplished. Made a plan for a video visit at the end of next week, which will be four days post surgery.     Intervention: Behavioral therapy - talked about replacement behaviors, ways her binge eating is supported by habit and working on ways to replace this with new behavior. Active listening, normalizing and validation. ACT - discussed becoming curious and allowing time ans space to allow herself to feel whatever it is she feels. Used CBT triangle as a means to teach separation of thoughts, feelings and behaviors. Processed her tendency to go to thoughts and behaviors and avoid her feelings. Practiced recognizing and sitting with emotions even if they are uncomfortable.     Current Stressors / Issues: Mental health symptoms, joint pain and limited mobility, eating/diet, work    Medication Review: She has had her Zoloft dose increased. Vannessa also reports having only a few Vyvance pills left and taking these on PRN basis for urges to binge.     Medication Compliance: Takes medications as prescribed    Changes in Health: Vannessa is scheduled to have both of her hips replaced. The first surgery is scheduled for 2/14.     Chemical Use Review:   Substance Use: No    Tobacco Use: No    ASSESSMENT: Current Emotional / Mental Status (status of significant symptoms):  Risk status no evidence of suicidal or homicidal ideation  Client denies current fears or concerns for personal safety.  A safety and risk management plan has not been developed at this time; however client was given the after-hours number should there be a change in any of these risk factors.    Appearance:                            Appropriate   Eye Contact:                           Good   Psychomotor  Behavior:          Fidgety  Attitude:                                   worried, hopefull and excited  Orientation:                             All  Speech              Rate / Production:      Unremarkable              Volume:                       Normal   Mood:                                      Energized, anxious  Affect:                                      Restless  Thought Content:                    Goal directed  Thought Form:                        [place]venal thinking  Insight:                                     Good    Collateral Reports Completed: PHQ9 & GAD7    PLAN: Weekly therapy, Next session will be conducted via video visit due to upcoming hip surgery.        [facility]

## 2022-02-10 ASSESSMENT — ANXIETY QUESTIONNAIRES: GAD7 TOTAL SCORE: 5

## 2022-02-14 ENCOUNTER — TRANSFERRED RECORDS (OUTPATIENT)
Dept: HEALTH INFORMATION MANAGEMENT | Facility: CLINIC | Age: 47
End: 2022-02-14
Payer: COMMERCIAL

## 2022-02-18 ENCOUNTER — MYC MEDICAL ADVICE (OUTPATIENT)
Dept: FAMILY MEDICINE | Facility: OTHER | Age: 47
End: 2022-02-18
Payer: COMMERCIAL

## 2022-02-18 DIAGNOSIS — E66.01 MORBID OBESITY (H): ICD-10-CM

## 2022-02-21 RX ORDER — METFORMIN HCL 500 MG
TABLET, EXTENDED RELEASE 24 HR ORAL
Qty: 120 TABLET | Refills: 1 | Status: SHIPPED | OUTPATIENT
Start: 2022-02-21 | End: 2022-05-16

## 2022-02-21 NOTE — TELEPHONE ENCOUNTER
You had filled it last for her on 1/13/2021. Disp 120 tabs with 11 refills. Take 2 tablets by mouth 2 times a day with meals. Swallow tablets whole, do not crush, divide, or chew.

## 2022-02-28 ENCOUNTER — OFFICE VISIT (OUTPATIENT)
Dept: FAMILY MEDICINE | Facility: OTHER | Age: 47
End: 2022-02-28
Attending: FAMILY MEDICINE
Payer: COMMERCIAL

## 2022-02-28 VITALS
HEIGHT: 67 IN | OXYGEN SATURATION: 98 % | WEIGHT: 255 LBS | TEMPERATURE: 97.6 F | HEART RATE: 80 BPM | SYSTOLIC BLOOD PRESSURE: 124 MMHG | BODY MASS INDEX: 40.02 KG/M2 | DIASTOLIC BLOOD PRESSURE: 80 MMHG

## 2022-02-28 DIAGNOSIS — E55.9 VITAMIN D DEFICIENCY: ICD-10-CM

## 2022-02-28 DIAGNOSIS — F33.1 MODERATE EPISODE OF RECURRENT MAJOR DEPRESSIVE DISORDER (H): Primary | ICD-10-CM

## 2022-02-28 DIAGNOSIS — I50.20 HEART FAILURE WITH REDUCED EJECTION FRACTION (H): ICD-10-CM

## 2022-02-28 DIAGNOSIS — Z96.641 STATUS POST TOTAL REPLACEMENT OF RIGHT HIP: ICD-10-CM

## 2022-02-28 DIAGNOSIS — R63.2 BINGE EATING: ICD-10-CM

## 2022-02-28 DIAGNOSIS — R93.1 ABNORMAL ECHOCARDIOGRAM: ICD-10-CM

## 2022-02-28 PROBLEM — M62.81 MUSCLE WEAKNESS: Status: ACTIVE | Noted: 2021-10-26

## 2022-02-28 PROBLEM — M16.0 OSTEOARTHRITIS OF BOTH HIPS: Status: ACTIVE | Noted: 2020-09-17

## 2022-02-28 PROBLEM — M25.661 KNEE JOINT STIFFNESS, BILATERAL: Status: ACTIVE | Noted: 2021-10-26

## 2022-02-28 PROBLEM — M16.12 ARTHRITIS OF LEFT HIP: Status: ACTIVE | Noted: 2022-01-06

## 2022-02-28 PROBLEM — M25.562 CHRONIC PAIN OF BOTH KNEES: Status: ACTIVE | Noted: 2021-10-26

## 2022-02-28 PROBLEM — M25.561 CHRONIC PAIN OF BOTH KNEES: Status: ACTIVE | Noted: 2021-10-26

## 2022-02-28 PROBLEM — M25.662 KNEE JOINT STIFFNESS, BILATERAL: Status: ACTIVE | Noted: 2021-10-26

## 2022-02-28 PROBLEM — G89.29 CHRONIC PAIN OF BOTH KNEES: Status: ACTIVE | Noted: 2021-10-26

## 2022-02-28 PROBLEM — N95.9 PREMENOPAUSAL PATIENT: Status: ACTIVE | Noted: 2021-09-30

## 2022-02-28 PROBLEM — F11.90 OPIOID USE, UNSPECIFIED, UNCOMPLICATED: Status: ACTIVE | Noted: 2022-01-06

## 2022-02-28 PROCEDURE — 36415 COLL VENOUS BLD VENIPUNCTURE: CPT | Performed by: FAMILY MEDICINE

## 2022-02-28 PROCEDURE — 99214 OFFICE O/P EST MOD 30 MIN: CPT | Performed by: FAMILY MEDICINE

## 2022-02-28 PROCEDURE — 82306 VITAMIN D 25 HYDROXY: CPT | Performed by: FAMILY MEDICINE

## 2022-02-28 RX ORDER — LISDEXAMFETAMINE DIMESYLATE 30 MG/1
30 CAPSULE ORAL DAILY
Qty: 30 CAPSULE | Refills: 0 | Status: SHIPPED | OUTPATIENT
Start: 2022-02-28 | End: 2022-03-30

## 2022-02-28 RX ORDER — LISDEXAMFETAMINE DIMESYLATE 30 MG/1
30 CAPSULE ORAL DAILY
Qty: 30 CAPSULE | Refills: 0 | Status: SHIPPED | OUTPATIENT
Start: 2022-05-01 | End: 2022-05-31

## 2022-02-28 RX ORDER — HYDROMORPHONE HYDROCHLORIDE 2 MG/1
2 TABLET ORAL
COMMUNITY
Start: 2022-02-15 | End: 2022-06-20

## 2022-02-28 RX ORDER — LISDEXAMFETAMINE DIMESYLATE 30 MG/1
30 CAPSULE ORAL DAILY
Qty: 30 CAPSULE | Refills: 0 | Status: SHIPPED | OUTPATIENT
Start: 2022-03-31 | End: 2022-04-30

## 2022-02-28 RX ORDER — LISDEXAMFETAMINE DIMESYLATE 30 MG/1
30 CAPSULE ORAL DAILY
Qty: 30 CAPSULE | Refills: 0 | Status: CANCELLED | OUTPATIENT
Start: 2022-02-28

## 2022-02-28 ASSESSMENT — PAIN SCALES - GENERAL: PAINLEVEL: NO PAIN (0)

## 2022-02-28 ASSESSMENT — ANXIETY QUESTIONNAIRES
2. NOT BEING ABLE TO STOP OR CONTROL WORRYING: SEVERAL DAYS
6. BECOMING EASILY ANNOYED OR IRRITABLE: NEARLY EVERY DAY
GAD7 TOTAL SCORE: 9
7. FEELING AFRAID AS IF SOMETHING AWFUL MIGHT HAPPEN: NOT AT ALL
4. TROUBLE RELAXING: NOT AT ALL
1. FEELING NERVOUS, ANXIOUS, OR ON EDGE: SEVERAL DAYS
5. BEING SO RESTLESS THAT IT IS HARD TO SIT STILL: NEARLY EVERY DAY
3. WORRYING TOO MUCH ABOUT DIFFERENT THINGS: SEVERAL DAYS

## 2022-02-28 ASSESSMENT — PATIENT HEALTH QUESTIONNAIRE - PHQ9: SUM OF ALL RESPONSES TO PHQ QUESTIONS 1-9: 6

## 2022-02-28 NOTE — NURSING NOTE
"Chief Complaint   Patient presents with     Depression     Anxiety       Initial /80 (BP Location: Left arm, Patient Position: Sitting, Cuff Size: Adult Regular)   Pulse 80   Temp 97.6  F (36.4  C) (Tympanic)   Ht 1.702 m (5' 7\")   Wt 115.7 kg (255 lb)   LMP 01/08/2021   SpO2 98%   BMI 39.94 kg/m   Estimated body mass index is 39.94 kg/m  as calculated from the following:    Height as of this encounter: 1.702 m (5' 7\").    Weight as of this encounter: 115.7 kg (255 lb).  Medication Reconciliation: complete  Princess Clements LPN  "

## 2022-02-28 NOTE — PATIENT INSTRUCTIONS
Will call with Vit D results and send replacement as appropriate.    Continue Zoloft.     Continue Vyvanse - re-evaluate ongoing need at 3 months.    Cardiology referral - Dr Alfaro and Mindi.  Note to Dr Alfaro regarding the Vyvanse.

## 2022-02-28 NOTE — PROGRESS NOTES
"  Assessment & Plan     Moderate episode of recurrent major depressive disorder (H)  Doing well with Zoloft. Continue.  Ongoing counseling.  Mood improving as pain and mobility improving with hip replaceements.    Binge eating  Weight is down.  Helpful with cravings.  Making healthier changes with types of food and quantity.  Reassess at 3 month.  Will be s/p bilateral OLAMIDE and more active.  - lisdexamfetamine (VYVANSE) 30 MG capsule; Take 1 capsule (30 mg) by mouth daily  - lisdexamfetamine (VYVANSE) 30 MG capsule; Take 1 capsule (30 mg) by mouth daily  - lisdexamfetamine (VYVANSE) 30 MG capsule; Take 1 capsule (30 mg) by mouth daily    Vitamin D deficiency  Update level and dose replacement accordingly.  - Vitamin D Deficiency; Future    Heart failure with reduced ejection fraction (H)  Echo - LV hypokinesis - 45%.  Cardiology referral in place.  Patient prefers local if possible.  Referral placed.  Asymptomatic.  - Adult Cardiology Eval  Referral    Abnormal echocardiogram  As above.  - Adult Cardiology Eval  Referral    Status post total replacement of right hip  Comment: doing well; left scheduled      BMI:   Estimated body mass index is 39.94 kg/m  as calculated from the following:    Height as of this encounter: 1.702 m (5' 7\").    Weight as of this encounter: 115.7 kg (255 lb).   Weight management plan: Discussed healthy diet and exercise guidelines    Patient Instructions   Will call with Vit D results and send replacement as appropriate.    Continue Zoloft.     Continue Vyvanse - re-evaluate ongoing need at 3 months.    Cardiology referral - Dr Alfaro and Mindi.  Note to Dr Alfaro regarding the Vyvanse.          Return in about 3 months (around 5/28/2022) for medication follow up.    Aaliyah Sethi MD  [facility] - KINA Hurd is a 46 year old who presents for the following health issues     HPI     Scheduled physical -due for pap/hpv -     Right total " hip replacement 2022.    Left scheduled 3/28/2022.    Depression and Anxiety Follow-Up    How are you doing with your depression since your last visit? Improved     How are you doing with your anxiety since your last visit?  No change    Are you having other symptoms that might be associated with depression or anxiety? No    Have you had a significant life event? OTHER: just had hip surgery, is scheduled for her left hip coming up     Do you have any concerns with your use of alcohol or other drugs? No     Counseling - Raini    Lots of down time    Reading books    zoloft 150 mg - desires to continue    Social History     Tobacco Use     Smoking status: Former Smoker     Types: Cigarettes     Quit date: 2004     Years since quittin.2     Smokeless tobacco: Never Used     Tobacco comment: Tried to quit   Vaping Use     Vaping Use: Never used   Substance Use Topics     Alcohol use: Not Currently     Alcohol/week: 0.0 standard drinks     Comment: hasnt drank in a year     Drug use: No     PHQ 2022   PHQ-9 Total Score 13 9 6   Q9: Thoughts of better off dead/self-harm past 2 weeks Not at all Not at all Not at all     NIRAJ-7 SCORE 2022   Total Score 4 (minimal anxiety) - -   Total Score 4 5 9     Last PHQ-9 2022   1.  Little interest or pleasure in doing things 0   2.  Feeling down, depressed, or hopeless 0   3.  Trouble falling or staying asleep, or sleeping too much 3   4.  Feeling tired or having little energy 3   5.  Poor appetite or overeating 0   6.  Feeling bad about yourself 0   7.  Trouble concentrating 0   8.  Moving slowly or restless 0   Q9: Thoughts of better off dead/self-harm past 2 weeks 0   PHQ-9 Total Score 6   Difficulty at work, home, or with people -     NIRAJ-7  2022   1. Feeling nervous, anxious, or on edge 1   2. Not being able to stop or control worrying 1   3. Worrying too much about different things 1   4. Trouble relaxing 0  "  5. Being so restless that it is hard to sit still 3   6. Becoming easily annoyed or irritable 3   7. Feeling afraid, as if something awful might happen 0   NIRAJ-7 Total Score 9   If you checked any problems, how difficult have they made it for you to do your work, take care of things at home, or get along with other people? -       Suicide Assessment Five-step Evaluation and Treatment (SAFE-T)      Echo - global hypokinesia - 40-45%.  Left ventricle.  EKG preop - \"wonky\" - echo ordered  Dr Shafer = [place] 4/2022.  Asymptomatic.  FHx - dad MI - 70s.    Binge eating disorder -   Vyvanse out since 2/14/20222.  More focused on it.  Weight down to 255.  Less snacking.  2 meals per day.  Healthier choices.    Review of Systems   Constitutional, HEENT, cardiovascular, pulmonary, gi and gu systems are negative, except as otherwise noted.      Objective    /80 (BP Location: Left arm, Patient Position: Sitting, Cuff Size: Adult Regular)   Pulse 80   Temp 97.6  F (36.4  C) (Tympanic)   Ht 1.702 m (5' 7\")   Wt 115.7 kg (255 lb)   LMP 01/08/2021   SpO2 98%   BMI 39.94 kg/m    Body mass index is 39.94 kg/m .  Physical Exam   GENERAL: healthy, alert, no distress, over weight and using a walker  NECK: no adenopathy, no asymmetry, masses, or scars and thyroid normal to palpation  RESP: lungs clear to auscultation - no rales, rhonchi or wheezes  CV: regular rate and rhythm, normal S1 S2, no S3 or S4, no murmur, click or rub, no peripheral edema and peripheral pulses strong  MS: no gross musculoskeletal defects noted, no edema  PSYCH: mentation appears normal, affect normal/bright    Vit D pending            "

## 2022-03-01 LAB — DEPRECATED CALCIDIOL+CALCIFEROL SERPL-MC: 100 UG/L (ref 20–75)

## 2022-03-01 ASSESSMENT — ANXIETY QUESTIONNAIRES: GAD7 TOTAL SCORE: 9

## 2022-03-19 ENCOUNTER — HEALTH MAINTENANCE LETTER (OUTPATIENT)
Age: 47
End: 2022-03-19

## 2022-03-28 ENCOUNTER — TRANSFERRED RECORDS (OUTPATIENT)
Dept: HEALTH INFORMATION MANAGEMENT | Facility: CLINIC | Age: 47
End: 2022-03-28
Payer: COMMERCIAL

## 2022-03-28 DIAGNOSIS — I10 BENIGN ESSENTIAL HYPERTENSION: ICD-10-CM

## 2022-03-28 RX ORDER — LOSARTAN POTASSIUM 25 MG/1
TABLET ORAL
Qty: 90 TABLET | Refills: 0 | Status: SHIPPED | OUTPATIENT
Start: 2022-03-28 | End: 2022-07-19

## 2022-03-28 NOTE — TELEPHONE ENCOUNTER
Losartan  Last Written Prescription Date: 8/17/21  Last Fill Quantity: 90 # of Refills: 1  Last Office Visit: 2/28/22

## 2022-04-06 ENCOUNTER — TRANSFERRED RECORDS (OUTPATIENT)
Dept: HEALTH INFORMATION MANAGEMENT | Facility: CLINIC | Age: 47
End: 2022-04-06
Payer: COMMERCIAL

## 2022-04-20 ENCOUNTER — TRANSFERRED RECORDS (OUTPATIENT)
Dept: HEALTH INFORMATION MANAGEMENT | Facility: CLINIC | Age: 47
End: 2022-04-20
Payer: COMMERCIAL

## 2022-04-21 PROBLEM — D64.89 OTHER SPECIFIED ANEMIAS: Status: ACTIVE | Noted: 2022-04-20

## 2022-04-21 PROBLEM — G47.33 OBSTRUCTIVE SLEEP APNEA SYNDROME: Status: ACTIVE | Noted: 2022-04-11

## 2022-04-21 PROBLEM — I42.9 CARDIOMYOPATHY, UNSPECIFIED TYPE (H): Status: ACTIVE | Noted: 2022-04-11

## 2022-05-02 ENCOUNTER — TELEPHONE (OUTPATIENT)
Dept: PSYCHOLOGY | Facility: OTHER | Age: 47
End: 2022-05-02
Payer: COMMERCIAL

## 2022-05-02 NOTE — TELEPHONE ENCOUNTER
Attempt # 1  Outcome: Talked with Patient    Comment: Writer spoke to patient to see if interested in continuing therapy - LOV 2/9/22 with Pattie Bucio [facility]. Patient states she does need appointment but is not sure when she is able to fit into her schedule. Writer advised appointment can be virtual as needed. Patient states she will look at calendar and review availability and will call to schedule as she is able. Patient verbalized appreciation for phone call

## 2022-05-16 DIAGNOSIS — E66.01 MORBID OBESITY (H): ICD-10-CM

## 2022-05-16 RX ORDER — METFORMIN HCL 500 MG
TABLET, EXTENDED RELEASE 24 HR ORAL
Qty: 120 TABLET | Refills: 0 | Status: SHIPPED | OUTPATIENT
Start: 2022-05-16 | End: 2022-07-19

## 2022-06-13 DIAGNOSIS — N92.0 MENORRHAGIA WITH REGULAR CYCLE: ICD-10-CM

## 2022-06-13 DIAGNOSIS — F33.1 MODERATE EPISODE OF RECURRENT MAJOR DEPRESSIVE DISORDER (H): ICD-10-CM

## 2022-06-14 RX ORDER — MEDROXYPROGESTERONE ACETATE 10 MG
TABLET ORAL
Qty: 90 TABLET | Refills: 0 | Status: SHIPPED | OUTPATIENT
Start: 2022-06-14 | End: 2022-08-29

## 2022-06-14 RX ORDER — SERTRALINE HYDROCHLORIDE 100 MG/1
TABLET, FILM COATED ORAL
Qty: 135 TABLET | Refills: 0 | Status: SHIPPED | OUTPATIENT
Start: 2022-06-14 | End: 2022-08-29

## 2022-06-14 NOTE — TELEPHONE ENCOUNTER
sertraline      Last Written Prescription Date:  2/4/22  Last Fill Quantity: 135,   # refills: 0  Last Office Visit: 2/28/22  Future Office visit:    Next 5 appointments (look out 90 days)    Jun 20, 2022  9:15 AM  (Arrive by 9:00 AM)  PHYSICAL with Aaliyah Suarez MD  [facility] [place] ([facility] [place] ) [address]  [place] [address]  [phone]           Medroxyprogesterone 2/16/22 #90

## 2022-06-28 ENCOUNTER — OFFICE VISIT (OUTPATIENT)
Dept: PSYCHOLOGY | Facility: OTHER | Age: 47
End: 2022-06-28
Attending: COUNSELOR
Payer: COMMERCIAL

## 2022-06-28 DIAGNOSIS — F41.1 GAD (GENERALIZED ANXIETY DISORDER): ICD-10-CM

## 2022-06-28 DIAGNOSIS — F33.1 MODERATE EPISODE OF RECURRENT MAJOR DEPRESSIVE DISORDER (H): Primary | ICD-10-CM

## 2022-06-28 DIAGNOSIS — F50.811 BINGE-EATING DISORDER, MODERATE: ICD-10-CM

## 2022-06-28 PROCEDURE — 90837 PSYTX W PT 60 MINUTES: CPT | Performed by: COUNSELOR

## 2022-06-28 ASSESSMENT — ANXIETY QUESTIONNAIRES
5. BEING SO RESTLESS THAT IT IS HARD TO SIT STILL: NOT AT ALL
1. FEELING NERVOUS, ANXIOUS, OR ON EDGE: SEVERAL DAYS
6. BECOMING EASILY ANNOYED OR IRRITABLE: SEVERAL DAYS
IF YOU CHECKED OFF ANY PROBLEMS ON THIS QUESTIONNAIRE, HOW DIFFICULT HAVE THESE PROBLEMS MADE IT FOR YOU TO DO YOUR WORK, TAKE CARE OF THINGS AT HOME, OR GET ALONG WITH OTHER PEOPLE: SOMEWHAT DIFFICULT
2. NOT BEING ABLE TO STOP OR CONTROL WORRYING: SEVERAL DAYS
GAD7 TOTAL SCORE: 6
3. WORRYING TOO MUCH ABOUT DIFFERENT THINGS: SEVERAL DAYS
GAD7 TOTAL SCORE: 6
7. FEELING AFRAID AS IF SOMETHING AWFUL MIGHT HAPPEN: SEVERAL DAYS

## 2022-06-28 ASSESSMENT — PATIENT HEALTH QUESTIONNAIRE - PHQ9
5. POOR APPETITE OR OVEREATING: SEVERAL DAYS
SUM OF ALL RESPONSES TO PHQ QUESTIONS 1-9: 11

## 2022-06-28 NOTE — PROGRESS NOTES
The author of this note documented a reason for not sharing it with the patient.    Counseling Progress Note    Client Name: Vannessa Gupta    Date of Service (when I saw the patient): 06/28/2022    Service Type: Individual Psychotherapy    Session Start Time:  11:00 am  Session End Time: 12:10 pm  Session Length: I spent 53+ minutes performing psychotherapy during this office visit.  Session Number: 24    DSM5 Diagnoses: 300.02 (F41.1) Generalized Anxiety Disorder; 307.51 (F50.8) Binge-Eating Disorder, Severity: Moderate    Attendees: Client     Health Maintenance Topics with due status: Due Soon       Topic Date Due    NIRAJ ASSESSMENT 02/17/2021       Treatment Plan Due Date: 05/12/2021  Diagnostic Assessment Due Date: 2/2/2022    DATA    Treatment Objective(s) Addressed in This Session: Improve self-acceptance, feeling content, improve ability to feel / experience emotions, reduce self-defeating behaviors - including but not limited to binge eating    Progress on / Status of Treatment Objective(s) / Homework: Vannessa scheduled today's appointment to re-establish for therapy with this clinician. She was seen last by this clinician in February. Vannessa gave an update of many happenings. She and her long term partner were  in March. She has continued to struggle with depression symptoms, reporting; difficulty getting out of bed, not wanting to do anything, canceling medical appointments and not being excited even for things she typically enjoys. She reports having a very difficult time sleeping. She says she is unable to turn her brain off. She says she has been coloring a lot and this can be helpful in relaxing but then she typically starts to feel stressed because she will feel she has spent too much time on this.  Vannessa had hip surgery and is experiencing much less pain and is getting around better. She talked about a lot of medical health struggles her mom is currently facing and her being extremely upset that  "her mom was not cared for by her siblings while she was on vacation (her honeymoon) in [facility]. Her mom is now in a nursing home in [place], which was a very difficult move. Vannessa continues to try to juggle everything and is feeling burned out.    Vannessa made a plan to practice belly breathing as a means to \"shut off\" her brain / worrying. She also plans to begin to go for walks in the morning and have this or something else picked out to be her motivator for the next morning.     Intervention: Active, empathic listening, normalizing and validation. Taught grounding and stabilizing skills. Talked about / taught mindfulness and belly breathing. Discussed EMDR. Plan to continue with more containment / stabilization skills next session.      Current Stressors / Issues: Mental health symptoms, mom's health, took a new position as a  for her current company, [employer].    Medication Review: She has had her Zoloft dose increased. Meds were adjusted due to discovering heart disease.    Medication Compliance: Takes medications as prescribed    Changes in Health: Vannessa reports success with her double hip replacement surgery. She also reported being diagnosed with heart disease and then being told - after weeks and many tests that things were good - she will have a follow-up appointment in August.     Chemical Use Review:   Substance Use: No    Tobacco Use: No    ASSESSMENT: Current Emotional / Mental Status (status of significant symptoms):  Risk status no evidence of suicidal or homicidal ideation  Client denies current fears or concerns for personal safety.  A safety and risk management plan has not been developed at this time; however client was given the after-hours number should there be a change in any of these risk factors.    Appearance:                            Appropriate   Eye Contact:                           Good   Psychomotor Behavior:          Fidgety  Attitude:                                   " worried, hopefull and excited  Orientation:                             All  Speech              Rate / Production:      Unremarkable              Volume:                       Normal   Mood:                                      Energized, anxious  Affect:                                      Restless  Thought Content:                    Goal directed  Thought Form:                        [place] thinking  Insight:                                     Good    Collateral Reports Completed: PHQ9 & GAD7    PLAN: Weekly therapy, Next session will be conducted via video visit due to upcoming hip surgery.        Patite Bucio [facility]

## 2022-07-12 ENCOUNTER — OFFICE VISIT (OUTPATIENT)
Dept: PSYCHOLOGY | Facility: OTHER | Age: 47
End: 2022-07-12
Attending: COUNSELOR
Payer: COMMERCIAL

## 2022-07-12 DIAGNOSIS — F41.1 GAD (GENERALIZED ANXIETY DISORDER): ICD-10-CM

## 2022-07-12 DIAGNOSIS — F50.811 BINGE-EATING DISORDER, MODERATE: ICD-10-CM

## 2022-07-12 DIAGNOSIS — F33.1 MODERATE EPISODE OF RECURRENT MAJOR DEPRESSIVE DISORDER (H): Primary | ICD-10-CM

## 2022-07-12 PROCEDURE — 90837 PSYTX W PT 60 MINUTES: CPT | Performed by: COUNSELOR

## 2022-07-12 ASSESSMENT — ANXIETY QUESTIONNAIRES
2. NOT BEING ABLE TO STOP OR CONTROL WORRYING: MORE THAN HALF THE DAYS
GAD7 TOTAL SCORE: 9
3. WORRYING TOO MUCH ABOUT DIFFERENT THINGS: MORE THAN HALF THE DAYS
IF YOU CHECKED OFF ANY PROBLEMS ON THIS QUESTIONNAIRE, HOW DIFFICULT HAVE THESE PROBLEMS MADE IT FOR YOU TO DO YOUR WORK, TAKE CARE OF THINGS AT HOME, OR GET ALONG WITH OTHER PEOPLE: SOMEWHAT DIFFICULT
1. FEELING NERVOUS, ANXIOUS, OR ON EDGE: MORE THAN HALF THE DAYS
6. BECOMING EASILY ANNOYED OR IRRITABLE: MORE THAN HALF THE DAYS
7. FEELING AFRAID AS IF SOMETHING AWFUL MIGHT HAPPEN: NOT AT ALL
GAD7 TOTAL SCORE: 9
5. BEING SO RESTLESS THAT IT IS HARD TO SIT STILL: NOT AT ALL

## 2022-07-12 ASSESSMENT — PATIENT HEALTH QUESTIONNAIRE - PHQ9
5. POOR APPETITE OR OVEREATING: SEVERAL DAYS
SUM OF ALL RESPONSES TO PHQ QUESTIONS 1-9: 13

## 2022-07-12 NOTE — PROGRESS NOTES
"The author of this note documented a reason for not sharing it with the patient.    Counseling Progress Note    Client Name: Vannessa Gupta    Date of Service (when I saw the patient): 07/12/2022    Service Type: Individual Psychotherapy    Session Start Time:  3:00 am  Session End Time: 4:00 pm  Session Length: I spent 53+ minutes performing psychotherapy during this office visit.  Session Number: 25    DSM5 Diagnoses: 300.02 (F41.1) Generalized Anxiety Disorder; 307.51 (F50.8) Binge-Eating Disorder, Severity: Moderate    Attendees: Client     Health Maintenance Topics with due status: Due Soon       Topic Date Due    NIRAJ ASSESSMENT 02/17/2021       Treatment Plan Due Date: 05/12/2021  Diagnostic Assessment Due Date: 2/2/2022    DATA    Treatment Objective(s) Addressed in This Session: Improve self-acceptance, feeling content,  reduce self-defeating behaviors - including but not limited to binge eating, decrease rumination/worry, increase motivation, engagement in activities of enjoyment and life satisfaction.    Progress on / Status of Treatment Objective(s) / Homework: Vannessa reports lack of motivation, drive or ambition. She reports feeling like things typically feel better and more manageable at work than they do at home. Discussion about ways to build motivation, energy and life satisfaction. She reports having a difficult time getting out of her head or shutting off constant worry.   Problem solved. Vannessa says she walked one day over the past two weeks. She reports that during that walk she heard a dog barking the entire time, which caused her to spin with thoughts about the dog and other related or unrelated items and struggled to relax or enjoy her walk. She reports  Relaxation breathing not working for her. She describes having too many thoughts floating in and then judges that she \"can't or isn't\" doing it right.   Made a plan to try to build in more routine into her life outside of work because this seems " to give her a sense of control, calm and satisfaction at home. Vannessa plans to make a list of things she finds pleasure or enjoyment in. She also plans to made a daily list or schedule for her time outside of work.  Vannessa states that she has a care team meeting for her mom tomorrow and will learn what is needed for her to get released. She also shared that her daughter is moving out and this is a source of worry for her. Reframed this worry to look at how her daughter is thriving and she has done her job as raising an independent & self-sufficient young lady.    Intervention: Active, empathic listening, normalizing and validation. Talked about building more enjoyment, fun, engagement and routine into her life outside of work.    Current Stressors / Issues: Mental health symptoms, mom's health, started her new position as a  for her current company, [email or web address], finances, her daughter moving out on her own.    Medication Review: She has had her Zoloft dose increased. Meds were adjusted due to discovering heart disease.     Medication Compliance: Takes medications as prescribed    Changes in Health: Difficulty with sleep - either sleeps too much, not enough or not good quality sleep - has a sleep study scheduled for September and a physical in August.    Chemical Use Review:   Substance Use: No    Tobacco Use: No    ASSESSMENT: Current Emotional / Mental Status (status of significant symptoms):  Risk status no evidence of suicidal or homicidal ideation  Client denies current fears or concerns for personal safety.  A safety and risk management plan has not been developed at this time; however client was given the after-hours number should there be a change in any of these risk factors.    Appearance:                            Appropriate   Eye Contact:                           Good   Psychomotor Behavior:          Fidgety, restless  Attitude:                                   worried, hopefull and  excited  Orientation:                             All  Speech              Rate / Production:      Unremarkable              Volume:                       Normal   Mood:                                      Energized, anxious  Affect:                                      Restless  Thought Content:                    Goal directed  Thought Form:                        [place] thinking  Insight:                                     Good    Collateral Reports Completed: PHQ9 & GAD7    PLAN: Weekly therapy.        Pattie Bucio [facility]

## 2022-08-19 ENCOUNTER — TELEPHONE (OUTPATIENT)
Dept: PSYCHOLOGY | Facility: OTHER | Age: 47
End: 2022-08-19

## 2022-08-19 NOTE — TELEPHONE ENCOUNTER
Attempt # 1  Outcome: Left Message   Comment: Left message to return call to schedule therapy appointment with PHAN Shah.

## 2022-08-20 NOTE — PATIENT INSTRUCTIONS
BP goal <130/80.  May need to adjust Losartan.    Will call with lab and pap/hpv results.    Medications refilled.    Pneumonia vaccine updated.    Mammogram already scheduled.    Colonoscopy referral placed.    Reminder - sleep study.      Preventive Health Recommendations  Female Ages 40 to 49    Yearly exam:   See your health care provider every year in order to  Review health changes.   Discuss preventive care.    Review your medicines if your doctor prescribed any.    Get a Pap test every three years (unless you have an abnormal result and your provider advises testing more often).    If you get Pap tests with HPV test, you only need to test every 5 years, unless you have an abnormal result. You do not need a Pap test if your uterus was removed (hysterectomy) and you have not had cancer.    You should be tested each year for STDs (sexually transmitted diseases), if you're at risk.   Ask your doctor if you should have a mammogram.    Have a colonoscopy (test for colon cancer) if someone in your family has had colon cancer or polyps before age 50.     Have a cholesterol test every 5 years.     Have a diabetes test (fasting glucose) after age 45. If you are at risk for diabetes, you should have this test every 3 years.    Shots: Get a flu shot each year. Get a tetanus shot every 10 years.     Nutrition:   Eat at least 5 servings of fruits and vegetables each day.  Eat whole-grain bread, whole-wheat pasta and brown rice instead of white grains and rice.  Get adequate Calcium and Vitamin D.      Lifestyle  Exercise at least 150 minutes a week (an average of 30 minutes a day, 5 days a week). This will help you control your weight and prevent disease.  Limit alcohol to one drink per day.  No smoking.   Wear sunscreen to prevent skin cancer.  See your dentist every six months for an exam and cleaning.

## 2022-08-23 ASSESSMENT — ENCOUNTER SYMPTOMS
NAUSEA: 0
HEMATOCHEZIA: 0
PALPITATIONS: 0
WEAKNESS: 0
JOINT SWELLING: 1
CHILLS: 0
ARTHRALGIAS: 1
SORE THROAT: 0
SHORTNESS OF BREATH: 0
EYE PAIN: 0
CONSTIPATION: 0
ABDOMINAL PAIN: 0
DYSURIA: 0
DIARRHEA: 0
NERVOUS/ANXIOUS: 1
FREQUENCY: 0
COUGH: 0
HEARTBURN: 0
PARESTHESIAS: 0
MYALGIAS: 0
HEADACHES: 0
FEVER: 0
DIZZINESS: 0
HEMATURIA: 0
BREAST MASS: 0

## 2022-08-29 ENCOUNTER — OFFICE VISIT (OUTPATIENT)
Dept: FAMILY MEDICINE | Facility: OTHER | Age: 47
End: 2022-08-29
Attending: FAMILY MEDICINE
Payer: COMMERCIAL

## 2022-08-29 VITALS
HEART RATE: 58 BPM | OXYGEN SATURATION: 98 % | HEIGHT: 66 IN | DIASTOLIC BLOOD PRESSURE: 80 MMHG | BODY MASS INDEX: 45 KG/M2 | TEMPERATURE: 98.4 F | WEIGHT: 280 LBS | SYSTOLIC BLOOD PRESSURE: 118 MMHG

## 2022-08-29 DIAGNOSIS — G47.33 OBSTRUCTIVE SLEEP APNEA SYNDROME: ICD-10-CM

## 2022-08-29 DIAGNOSIS — E88.810 INSULIN RESISTANCE SYNDROME: ICD-10-CM

## 2022-08-29 DIAGNOSIS — N92.0 MENORRHAGIA WITH REGULAR CYCLE: ICD-10-CM

## 2022-08-29 DIAGNOSIS — F33.1 MODERATE EPISODE OF RECURRENT MAJOR DEPRESSIVE DISORDER (H): ICD-10-CM

## 2022-08-29 DIAGNOSIS — E55.9 VITAMIN D DEFICIENCY: ICD-10-CM

## 2022-08-29 DIAGNOSIS — I10 BENIGN ESSENTIAL HYPERTENSION: ICD-10-CM

## 2022-08-29 DIAGNOSIS — Z00.00 ROUTINE GENERAL MEDICAL EXAMINATION AT A HEALTH CARE FACILITY: Primary | ICD-10-CM

## 2022-08-29 DIAGNOSIS — E66.01 MORBID OBESITY (H): ICD-10-CM

## 2022-08-29 LAB
ALBUMIN SERPL-MCNC: 3.4 G/DL (ref 3.4–5)
ALP SERPL-CCNC: 77 U/L (ref 40–150)
ALT SERPL W P-5'-P-CCNC: 14 U/L (ref 0–50)
ANION GAP SERPL CALCULATED.3IONS-SCNC: 6 MMOL/L (ref 3–14)
AST SERPL W P-5'-P-CCNC: 11 U/L (ref 0–45)
BASOPHILS # BLD AUTO: 0.1 10E3/UL (ref 0–0.2)
BASOPHILS NFR BLD AUTO: 1 %
BILIRUB SERPL-MCNC: 0.3 MG/DL (ref 0.2–1.3)
BUN SERPL-MCNC: 10 MG/DL (ref 7–30)
CALCIUM SERPL-MCNC: 9 MG/DL (ref 8.5–10.1)
CHLORIDE BLD-SCNC: 105 MMOL/L (ref 94–109)
CHOLEST SERPL-MCNC: 201 MG/DL
CO2 SERPL-SCNC: 26 MMOL/L (ref 20–32)
CREAT SERPL-MCNC: 0.58 MG/DL (ref 0.52–1.04)
EOSINOPHIL # BLD AUTO: 0.4 10E3/UL (ref 0–0.7)
EOSINOPHIL NFR BLD AUTO: 5 %
ERYTHROCYTE [DISTWIDTH] IN BLOOD BY AUTOMATED COUNT: 14.8 % (ref 10–15)
EST. AVERAGE GLUCOSE BLD GHB EST-MCNC: 111 MG/DL
FASTING STATUS PATIENT QL REPORTED: NO
GFR SERPL CREATININE-BSD FRML MDRD: >90 ML/MIN/1.73M2
GLUCOSE BLD-MCNC: 88 MG/DL (ref 70–99)
HBA1C MFR BLD: 5.5 % (ref 0–5.6)
HCT VFR BLD AUTO: 37.8 % (ref 35–47)
HDLC SERPL-MCNC: 34 MG/DL
HGB BLD-MCNC: 12.1 G/DL (ref 11.7–15.7)
IMM GRANULOCYTES # BLD: 0 10E3/UL
IMM GRANULOCYTES NFR BLD: 0 %
LDLC SERPL CALC-MCNC: 121 MG/DL
LYMPHOCYTES # BLD AUTO: 3.1 10E3/UL (ref 0.8–5.3)
LYMPHOCYTES NFR BLD AUTO: 41 %
MCH RBC QN AUTO: 26.3 PG (ref 26.5–33)
MCHC RBC AUTO-ENTMCNC: 32 G/DL (ref 31.5–36.5)
MCV RBC AUTO: 82 FL (ref 78–100)
MONOCYTES # BLD AUTO: 0.5 10E3/UL (ref 0–1.3)
MONOCYTES NFR BLD AUTO: 7 %
NEUTROPHILS # BLD AUTO: 3.4 10E3/UL (ref 1.6–8.3)
NEUTROPHILS NFR BLD AUTO: 46 %
NONHDLC SERPL-MCNC: 167 MG/DL
NRBC # BLD AUTO: 0 10E3/UL
NRBC BLD AUTO-RTO: 0 /100
PLATELET # BLD AUTO: 515 10E3/UL (ref 150–450)
POTASSIUM BLD-SCNC: 3.5 MMOL/L (ref 3.4–5.3)
PROT SERPL-MCNC: 8 G/DL (ref 6.8–8.8)
RBC # BLD AUTO: 4.6 10E6/UL (ref 3.8–5.2)
SODIUM SERPL-SCNC: 137 MMOL/L (ref 133–144)
TRIGL SERPL-MCNC: 230 MG/DL
WBC # BLD AUTO: 7.5 10E3/UL (ref 4–11)

## 2022-08-29 PROCEDURE — 36415 COLL VENOUS BLD VENIPUNCTURE: CPT | Performed by: FAMILY MEDICINE

## 2022-08-29 PROCEDURE — 82306 VITAMIN D 25 HYDROXY: CPT | Performed by: FAMILY MEDICINE

## 2022-08-29 PROCEDURE — 80053 COMPREHEN METABOLIC PANEL: CPT | Performed by: FAMILY MEDICINE

## 2022-08-29 PROCEDURE — 90471 IMMUNIZATION ADMIN: CPT | Performed by: FAMILY MEDICINE

## 2022-08-29 PROCEDURE — 83036 HEMOGLOBIN GLYCOSYLATED A1C: CPT | Performed by: FAMILY MEDICINE

## 2022-08-29 PROCEDURE — 85025 COMPLETE CBC W/AUTO DIFF WBC: CPT | Performed by: FAMILY MEDICINE

## 2022-08-29 PROCEDURE — 80061 LIPID PANEL: CPT | Performed by: FAMILY MEDICINE

## 2022-08-29 PROCEDURE — 99213 OFFICE O/P EST LOW 20 MIN: CPT | Mod: 25 | Performed by: FAMILY MEDICINE

## 2022-08-29 PROCEDURE — 99396 PREV VISIT EST AGE 40-64: CPT | Mod: 25 | Performed by: FAMILY MEDICINE

## 2022-08-29 PROCEDURE — 87624 HPV HI-RISK TYP POOLED RSLT: CPT | Performed by: FAMILY MEDICINE

## 2022-08-29 PROCEDURE — 90677 PCV20 VACCINE IM: CPT | Performed by: FAMILY MEDICINE

## 2022-08-29 PROCEDURE — G0123 SCREEN CERV/VAG THIN LAYER: HCPCS | Performed by: FAMILY MEDICINE

## 2022-08-29 RX ORDER — LOSARTAN POTASSIUM 25 MG/1
25 TABLET ORAL DAILY
Qty: 90 TABLET | Refills: 3 | Status: SHIPPED | OUTPATIENT
Start: 2022-08-29 | End: 2023-02-13

## 2022-08-29 RX ORDER — METFORMIN HCL 500 MG
1000 TABLET, EXTENDED RELEASE 24 HR ORAL 2 TIMES DAILY WITH MEALS
Qty: 120 TABLET | Refills: 3 | Status: SHIPPED | OUTPATIENT
Start: 2022-08-29 | End: 2023-03-02

## 2022-08-29 RX ORDER — MEDROXYPROGESTERONE ACETATE 10 MG
10 TABLET ORAL DAILY
Qty: 90 TABLET | Refills: 3 | Status: SHIPPED | OUTPATIENT
Start: 2022-08-29 | End: 2023-02-13

## 2022-08-29 RX ORDER — SENNOSIDES 8.6 MG
TABLET ORAL
COMMUNITY
Start: 2022-03-28 | End: 2023-04-02

## 2022-08-29 RX ORDER — POTASSIUM CHLORIDE 750 MG/1
10 TABLET, EXTENDED RELEASE ORAL
COMMUNITY
Start: 2022-08-24 | End: 2023-02-24

## 2022-08-29 RX ORDER — MAGNESIUM OXIDE 400 MG/1
400 TABLET ORAL
COMMUNITY
Start: 2022-08-24 | End: 2023-02-13

## 2022-08-29 RX ORDER — SERTRALINE HYDROCHLORIDE 100 MG/1
TABLET, FILM COATED ORAL
Qty: 135 TABLET | Refills: 3 | Status: SHIPPED | OUTPATIENT
Start: 2022-08-29 | End: 2023-03-02

## 2022-08-29 ASSESSMENT — ANXIETY QUESTIONNAIRES
5. BEING SO RESTLESS THAT IT IS HARD TO SIT STILL: NOT AT ALL
GAD7 TOTAL SCORE: 0
3. WORRYING TOO MUCH ABOUT DIFFERENT THINGS: NOT AT ALL
7. FEELING AFRAID AS IF SOMETHING AWFUL MIGHT HAPPEN: NOT AT ALL
GAD7 TOTAL SCORE: 0
1. FEELING NERVOUS, ANXIOUS, OR ON EDGE: NOT AT ALL
2. NOT BEING ABLE TO STOP OR CONTROL WORRYING: NOT AT ALL
4. TROUBLE RELAXING: NOT AT ALL
6. BECOMING EASILY ANNOYED OR IRRITABLE: NOT AT ALL

## 2022-08-29 ASSESSMENT — ENCOUNTER SYMPTOMS
WEAKNESS: 0
HEMATURIA: 0
NERVOUS/ANXIOUS: 1
DIZZINESS: 0
NAUSEA: 0
FREQUENCY: 0
FEVER: 0
COUGH: 0
DYSURIA: 0
CHILLS: 0
BREAST MASS: 0
EYE PAIN: 0
HEADACHES: 0
DIARRHEA: 0
PALPITATIONS: 0
CONSTIPATION: 0
SORE THROAT: 0
HEARTBURN: 0
ABDOMINAL PAIN: 0
ARTHRALGIAS: 1
SHORTNESS OF BREATH: 0
JOINT SWELLING: 1
MYALGIAS: 0
PARESTHESIAS: 0
HEMATOCHEZIA: 0

## 2022-08-29 ASSESSMENT — PAIN SCALES - GENERAL: PAINLEVEL: NO PAIN (0)

## 2022-08-29 ASSESSMENT — PATIENT HEALTH QUESTIONNAIRE - PHQ9: SUM OF ALL RESPONSES TO PHQ QUESTIONS 1-9: 0

## 2022-08-29 NOTE — PROGRESS NOTES
SUBJECTIVE:   CC: Vannessa Gupta is an 47 year old woman who presents for preventive health visit.       Patient has been advised of split billing requirements and indicates understanding: Yes  Healthy Habits:     Getting at least 3 servings of Calcium per day:  NO    Bi-annual eye exam:  Yes    Dental care twice a year:  Yes    Sleep apnea or symptoms of sleep apnea:  Daytime drowsiness    Diet:  Regular (no restrictions)    Frequency of exercise:  1 day/week    Duration of exercise:  15-30 minutes    Taking medications regularly:  Yes    Medication side effects:  None    PHQ-2 Total Score: 1    Additional concerns today:  Yes    Vyvanse was effective.  Lost weight prior to surgery.  Weight rebounded -     Colon screen - okay, referral pended; dad with history of polyps but not cancerous; no symptoms    Pneumonia - okay    Pap/hpv - yes; last one 2017; no prior abnormal.  No symptoms.    Mammogram scheduled 9/27/22.    TROY - remote sleep study.    Repeat was cancelled -     Hypertension Follow-up    Do you check your blood pressure regularly outside of the clinic? Yes     Are you following a low salt diet? No    Are your blood pressures ever more than 140 on the top number (systolic) OR more   than 90 on the bottom number (diastolic), for example 140/90? Yes   Recently good.  On potassium/magnesium supplements.  Just saw cardiology.  Improved.  Thought perhaps from Vyvanse.  Has follow up 6 months.    Depression and Anxiety Follow-Up    How are you doing with your depression since your last visit? Improved     How are you doing with your anxiety since your last visit?  Improved     Are you having other symptoms that might be associated with depression or anxiety? No    Have you had a significant life event? No     Do you have any concerns with your use of alcohol or other drugs? No     On zoloft 150 mg - desires to continue    No recent counseling    Social History     Tobacco Use     Smoking status: Former  Smoker     Types: Cigarettes     Quit date: 2004     Years since quittin.7     Smokeless tobacco: Never Used     Tobacco comment: Tried to quit   Vaping Use     Vaping Use: Never used   Substance Use Topics     Alcohol use: Not Currently     Alcohol/week: 0.0 standard drinks     Comment: hasnt drank in a year     Drug use: No     PHQ 2022   PHQ-9 Total Score 6 11 13   Q9: Thoughts of better off dead/self-harm past 2 weeks Not at all Not at all Not at all     NIRAJ-7 SCORE 2022   Total Score - - -   Total Score 9 6 9     Last PHQ-9 2022   1.  Little interest or pleasure in doing things 0   2.  Feeling down, depressed, or hopeless 0   3.  Trouble falling or staying asleep, or sleeping too much 0   4.  Feeling tired or having little energy 0   5.  Poor appetite or overeating 0   6.  Feeling bad about yourself 0   7.  Trouble concentrating 0   8.  Moving slowly or restless 0   Q9: Thoughts of better off dead/self-harm past 2 weeks 0   PHQ-9 Total Score 0   Difficulty at work, home, or with people -     NIRAJ-7  2022   1. Feeling nervous, anxious, or on edge 0   2. Not being able to stop or control worrying 0   3. Worrying too much about different things 0   4. Trouble relaxing 0   5. Being so restless that it is hard to sit still 0   6. Becoming easily annoyed or irritable 0   7. Feeling afraid, as if something awful might happen 0   NIRAJ-7 Total Score 0   If you checked any problems, how difficult have they made it for you to do your work, take care of things at home, or get along with other people? -       Suicide Assessment Five-step Evaluation and Treatment (SAFE-T)      Today's PHQ-2 Score:   PHQ-2 (  [employer]) 2022   Q1: Little interest or pleasure in doing things 1   Q2: Feeling down, depressed or hopeless 0   PHQ-2 Score 1   PHQ-2 Total Score (12-17 Years)- Positive if 3 or more points; Administer PHQ-A if positive -   Q1: Little interest  or pleasure in doing things Several days   Q2: Feeling down, depressed or hopeless Not at all   PHQ-2 Score 1       Abuse: Current or Past (Physical, Sexual or Emotional) - No  Do you feel safe in your environment? Yes    Have you ever done Advance Care Planning? (For example, a Health Directive, POLST, or a discussion with a medical provider or your loved ones about your wishes): Yes, patient states has an Advance Care Planning document and will bring a copy to the clinic.    Social History     Tobacco Use     Smoking status: Former Smoker     Types: Cigarettes     Quit date: 2004     Years since quittin.7     Smokeless tobacco: Never Used     Tobacco comment: Tried to quit   Substance Use Topics     Alcohol use: Not Currently     Alcohol/week: 0.0 standard drinks     Comment: hasnt drank in a year     If you drink alcohol do you typically have >3 drinks per day or >7 drinks per week? No    Alcohol Use 2022   Prescreen: >3 drinks/day or >7 drinks/week? No   Prescreen: >3 drinks/day or >7 drinks/week? -       Reviewed orders with patient.  Reviewed health maintenance and updated orders accordingly - Yes  Current Outpatient Medications   Medication     cholecalciferol (VITAMIN D3) 5000 units (125 mcg) capsule     ferrous fumarate 65 mg, [place]. FE,-Vitamin C 125 mg (VITRON C)  MG TABS tablet     losartan (COZAAR) 25 MG tablet     magnesium oxide (MAG-OX) 400 MG tablet     medroxyPROGESTERone (PROVERA) 10 MG tablet     metFORMIN (GLUCOPHAGE XR) 500 MG 24 hr tablet     metoprolol succinate ER (TOPROL-XL) 25 MG 24 hr tablet     potassium chloride ER (K-TAB/KLOR-CON) 10 MEQ CR tablet     sennosides (SENOKOT) 8.6 MG tablet     sertraline (ZOLOFT) 100 MG tablet     No current facility-administered medications for this visit.       Lab work is in process  Labs reviewed in EPIC    Breast Cancer Screening:    Breast CA Risk Assessment (FHS-7) 2022   Do you have a family history of breast, colon, or  ovarian cancer? No / Unknown         Mammogram Screening: Recommended annual mammography  Pertinent mammograms are reviewed under the imaging tab.    History of abnormal Pap smear: NO - age 30-65 PAP every 5 years with negative HPV co-testing recommended  PAP / HPV Latest Ref Rng & Units 2017   PAP (Historical) - NIL NIL   HPV16 NEG Negative -   HPV18 NEG Negative -   HRHPV NEG Negative -     Reviewed and updated as needed this visit by clinical staff                    Reviewed and updated as needed this visit by Provider                   Past Medical History:   Diagnosis Date     Benign essential hypertension 2015     Cardiomyopathy, unspecified (H)     right and left cath to be done; [place] -Dr Shafer; etiology? TROY, Vyvanse, other     GERD (gastroesophageal reflux diseae) 2011     Heart failure with reduced ejection fraction (H)     echo 2022; [place]; EF 40-45%; mild hypokinesis LV; trace TR     Major depressive affective disorder, recurrent episode, unspecified 2011     Obesity  2011     Primary oste[facility] of both hips     [facility]; planning total hip arthroplasty; goal weight first 250     Vitamin D deficiency 2015      Past Surgical History:   Procedure Laterality Date     ANGIOGRAM  2020    LHC - normal RCA/LCX; LAD luminal irregularities; Dr Hair[facility]; plan c[facility]     bilateral tubal ligation        section  ,         oligohydramnios       Tonsillectomy       TOTAL HIP ARTHROPLASTY Right 2022    Dr De Leon     TOTAL HIP ARTHROPLASTY Left 2022    Dr De Leon     OB History    Para Term  AB Living   2 2 2 0 0 0   SAB IAB Ectopic Multiple Live Births   0 0 0 0 0      # Outcome Date GA Lbr Justin/2nd Weight Sex Delivery Anes PTL Lv   2 Term            1 Term                Review of Systems   Constitutional: Negative for chills and fever.   HENT: Negative for congestion, ear pain, hearing  loss and sore throat.    Eyes: Positive for visual disturbance. Negative for pain.   Respiratory: Negative for cough and shortness of breath.    Cardiovascular: Negative for chest pain, palpitations and peripheral edema.   Gastrointestinal: Negative for abdominal pain, constipation, diarrhea, heartburn, hematochezia and nausea.   Breasts:  Negative for tenderness, breast mass and discharge.   Genitourinary: Negative for dysuria, frequency, genital sores, hematuria, pelvic pain, urgency, vaginal bleeding and vaginal discharge.   Musculoskeletal: Positive for arthralgias and joint swelling. Negative for myalgias.   Skin: Negative for rash.   Neurological: Negative for dizziness, weakness, headaches and paresthesias.   Psychiatric/Behavioral: Negative for mood changes. The patient is nervous/anxious.         OBJECTIVE:   LMP 01/08/2021   Physical Exam  GENERAL: alert, no distress and obese  EYES: Eyes grossly normal to inspection, PERRL and conjunctivae and sclerae normal  HENT: ear canals and TM's normal, nose and mouth without ulcers or lesions  NECK: no adenopathy, no asymmetry, masses, or scars and thyroid normal to palpation  RESP: lungs clear to auscultation - no rales, rhonchi or wheezes  BREAST: normal without masses, tenderness or nipple discharge and no palpable axillary masses or adenopathy  CV: regular rate and rhythm, normal S1 S2, no S3 or S4, no murmur, click or rub, no peripheral edema and peripheral pulses strong  ABDOMEN: soft, nontender, no hepatosplenomegaly, no masses and bowel sounds normal   (female): normal female external genitalia, normal urethral meatus, vaginal mucosa pink, moist, well rugated, and normal cervix/adnexa/uterus without masses or discharge  MS: no gross musculoskeletal defects noted, no edema  SKIN: no suspicious lesions or rashes  NEURO: Normal strength and tone, mentation intact and speech normal  PSYCH: mentation appears normal, affect normal/bright    Diagnostic Test  Results:  Labs reviewed in Epic  Labs ordered    ASSESSMENT/PLAN:       ICD-10-CM    1. Routine general medical examination at a health care facility  Z00.00 Adult General Surg Referral     A pap thin layer screen with  HPV - recommended age 30 - 65 years     Comprehensive metabolic panel (BMP + Alb, Alk Phos, ALT, AST, Total. Bili, TP)     CBC with platelets and differential   2. Moderate episode of recurrent major depressive disorder (H)  F33.1 sertraline (ZOLOFT) 100 MG tablet   3. Benign essential hypertension  I10 Comprehensive metabolic panel (BMP + Alb, Alk Phos, ALT, AST, Total. Bili, TP)     CBC with platelets and differential     losartan (COZAAR) 25 MG tablet   4. Insulin resistance syndrome  E88.81 Hemoglobin A1c     Comprehensive metabolic panel (BMP + Alb, Alk Phos, ALT, AST, Total. Bili, TP)   5. Morbid obesity (H)  E66.01 Comprehensive metabolic panel (BMP + Alb, Alk Phos, ALT, AST, Total. Bili, TP)     CBC with platelets and differential     Lipid Profile (Chol, Trig, HDL, LDL calc)     metFORMIN (GLUCOPHAGE XR) 500 MG 24 hr tablet   6. Vitamin D deficiency  E55.9 Vitamin D Deficiency   7. Obstructive sleep apnea syndrome  G47.33    8. Menorrhagia with regular cycle--US normal---10/2019  N92.0 medroxyPROGESTERone (PROVERA) 10 MG tablet     Depression - stable with Prozac - desires to continue.    HTN - borderline control; following with cardiology as well; goal <130/80.    Insulin resistance - update a1c.  On max metformin.  Ongoing diet/weight efforts.    TROY - needs repeat sleep study.      Will call with lab and pap/hpv results.    Medications refilled.    Pneumonia vaccine updated.    Mammogram already scheduled.    Colonoscopy referral placed.    Reminder - sleep study.    COUNSELING:  Reviewed preventive health counseling, as reflected in patient instructions       Regular exercise       Healthy diet/nutrition       Vision screening       Hearing screening       Alcohol Use       Osteoporosis  "prevention/bone health       Safe sex practices/STD prevention       Colorectal Cancer Screening       Consider Hep C screening for all patients one time for ages 18-79 years       HIV screeninx in teen years, 1x in adult years, and at intervals if high risk    Estimated body mass index is 39.94 kg/m  as calculated from the following:    Height as of 22: 1.702 m (5' 7\").    Weight as of 22: 115.7 kg (255 lb).    Weight management plan: Discussed healthy diet and exercise guidelines    She reports that she quit smoking about 17 years ago. Her smoking use included cigarettes. She has never used smokeless tobacco.      Counseling Resources:  ATP IV Guidelines  Pooled Cohorts Equation Calculator  Breast Cancer Risk Calculator  BRCA-Related Cancer Risk Assessment: FHS-7 Tool  FRAX Risk Assessment  ICSI Preventive Guidelines  Dietary Guidelines for Americans, 2010  USDA's MyPlate  ASA Prophylaxis  Lung CA Screening    Aaliyah Sethi MD  [facility] [place]  "

## 2022-08-29 NOTE — NURSING NOTE
"Chief Complaint   Patient presents with     Physical       Initial /80 (BP Location: Right arm, Patient Position: Sitting, Cuff Size: Adult Regular)   Pulse 58   Temp 98.4  F (36.9  C) (Tympanic)   Ht 1.676 m (5' 6\")   Wt 127 kg (280 lb)   LMP 01/08/2021   SpO2 98%   BMI 45.19 kg/m   Estimated body mass index is 45.19 kg/m  as calculated from the following:    Height as of this encounter: 1.676 m (5' 6\").    Weight as of this encounter: 127 kg (280 lb).  Medication Reconciliation: complete  Princess Clements LPN  "

## 2022-08-29 NOTE — LETTER
Opioid / Opioid Plus Controlled Substance Agreement    This is an agreement between you and your provider about the safe and appropriate use of controlled substance/opioids prescribed by your care team. Controlled substances are medicines that can cause physical and mental dependence (abuse).    There are strict laws about having and using these medicines. We here at [facility] are committing to working with you in your efforts to get better. To support you in this work, we ll help you schedule regular office appointments for medicine refills. If we must cancel or change your appointment for any reason, we ll make sure you have enough medicine to last until your next appointment.     As a Provider, I will:    Listen carefully to your concerns and treat you with respect.     Recommend a treatment plan that I believe is in your best interest. This plan may involve therapies other than opioid pain medication.     Talk with you often about the possible benefits, and the risk of harm of any medicine that we prescribe for you.     Provide a plan on how to taper (discontinue or go off) using this medicine if the decision is made to stop its use.    As a Patient, I understand that opioid(s):     Are a controlled substance prescribed by my care team to help me function or work and manage my condition(s).     Are strong medicines and can cause serious side effects such as:    Drowsiness, which can seriously affect my driving ability    A lower breathing rate, enough to cause death    Harm to my thinking ability     Depression     Abuse of and addiction to this medicine    Need to be taken exactly as prescribed. Combining opioids with certain medicines or chemicals (such as illegal drugs, sedatives, sleeping pills, and benzodiazepines) can be dangerous or even fatal. If I stop opioids suddenly, I may have severe withdrawal symptoms.    Do not work for all types of pain nor for all patients. If they re not helpful, I may  be asked to stop them.    {Benzo / Stimulant (Optional):745168}    The risks, benefits and side effects of these medicine(s) were explained to me. I agree that:  1. I will take part in other treatments as advised by my care team. This may be psychiatry or counseling, physical therapy, behavioral therapy, group treatment or a referral to a specialist.     2. I will keep all my appointments. I understand that this is part of the monitoring of opioids. My care team may require an office visit for EVERY opioid/controlled substance refill. If I miss appointments or don t follow instructions, my care team may stop my medicine.    3. I will take my medicines as prescribed. I will not change the dose or schedule unless my care team tells me to. There will be no refills if I run out early.     4. I may be asked to come to the clinic and complete a urine drug test or complete a pill count at any time. If I don t give a urine sample or participate in a pill count, the care team may stop my medicine.    5. I will only receive prescriptions from this clinic for chronic pain. If I am treated by another provider for acute pain issues, I will tell them that I am taking opioid pain medication for chronic pain and that I have a treatment agreement with this provider. I will inform my [facility] care team within one business day if I am given a prescription for any pain medication by another healthcare provider. My [facility] care team can contact other providers and pharmacists about my use of any medicines.    6. It is up to me to make sure that I don t run out of my medicines on weekends or holidays. If my care team is willing to refill my opioid prescription without a visit, I must request refills only during office hours. Refills may take up to 3 business days to process. I will use one pharmacy to fill all my opioid and other controlled substance prescriptions. I will notify the clinic about any changes to my  insurance or medication availability.    7. I am responsible for my prescriptions. If the medicine/prescription is lost, stolen or destroyed, it will not be replaced. I also agree not to share controlled substance medicines with anyone.    8. I am aware I should not use any illegal or recreational drugs. I agree not to drink alcohol unless my care team says I can.       9. If I enroll in the [place] [facility] Cann[facility] program, I will tell my care team prior to my next refill.     10. I will tell my care team right away if I become pregnant, have a new medical problem treated outside of my regular clinic, or have a change in my medications.    11. I understand that this medicine can affect my thinking, judgment and reaction time. Alcohol and drugs affect the brain and body, which can affect the safety of my driving. Being under the influence of alcohol or drugs can affect my decision-making, behaviors, personal safety, and the safety of others. Driving while impaired (DWI) can occur if a person is driving, operating, or in physical control of a car, motorcycle, boat, snowmobile, ATV, motorbike, off-road vehicle, or any other motor vehicle ([place] Statute 169A.20). I understand the risk if I choose to drive or operate any vehicle or machinery.    I understand that if I do not follow any of the conditions above, my prescriptions or treatment may be stopped or changed.          Opioids  What You Need to Know    What are opioids?   Opioids are pain medicines that must be prescribed by a doctor. They are also known as narcotics.     Examples are:   1. morphine (MS Contin, Aileen)  2. oxycodone (Oxycontin)  3. oxycodone and acetaminophen (Percocet)  4. hydrocodone and acetaminophen (Vicodin, Norco)   5. fentanyl patch (Duragesic)   6. hydromorphone (Dilaudid)   7. methadone  8. codeine (Tylenol #3)     What do opioids do well?   Opioids are best for severe short-term pain such as after a surgery or injury. They may work well  for cancer pain. They may help some people with long-lasting (chronic) pain.     What do opioids NOT do well?   Opioids never get rid of pain entirely, and they don t work well for most patients with chronic pain. Opioids don t reduce swelling, one of the causes of pain.                                    Other ways to manage chronic pain and improve function include:       Treat the health problem that may be causing pain    Anti-inflammation medicines, which reduce swelling and tenderness, such as ibuprofen (Advil, Motrin) or naproxen (Aleve)    Acetaminophen (Tylenol)    Antidepressants and anti-seizure medicines, especially for nerve pain    Topical treatments such as patches or creams    Injections or nerve blocks    Chiropractic or osteopathic treatment    Acupuncture, massage, deep breathing, meditation, visual imagery, aromatherapy    Use heat or ice at the pain site    Physical therapy     Exercise    Stop smoking    Take part in therapy       Risks and side effects     Talk to your doctor before you start or decide to keep taking opioids. Possible side effects include:      Lowering your breathing rate enough to cause death    Overdose, including death, especially if taking higher than prescribed doses    Worse depression symptoms; less pleasure in things you usually enjoy    Feeling tired or sluggish    Slower thoughts or cloudy thinking    Being more sensitive to pain over time; pain is harder to control    Trouble sleeping or restless sleep    Changes in hormone levels (for example, less testosterone)    Changes in sex drive or ability to have sex    Constipation    Unsafe driving    Itching and sweating    Dizziness    Nausea, throwing up and dry mouth    What else should I know about opioids?    Opioids may lead to dependence, tolerance, or addiction.      Dependence means that if you stop or reduce the medicine too quickly, you will have withdrawal symptoms. These include loose poop (diarrhea),  jitters, flu-like symptoms, nervousness and tremors. Dependence is not the same as addiction.                       Tolerance means needing higher doses over time to get the same effect. This may increase the chance of serious side effects.      Addiction is when people improperly use a substance that harms their body, their mind or their relations with others. Use of opiates can cause a relapse of addiction if you have a history of drug or alcohol abuse.      People who have used opioids for a long time may have a lower quality of life, worse depression, higher levels of pain and more visits to doctors.    You can overdose on opioids. Take these steps to lower your risk of overdose:    1. Recognize the signs:  Signs of overdose include decrease or loss of consciousness (blackout), slowed breathing, trouble waking up and blue lips. If someone is worried about overdose, they should call 911.    2. Talk to your doctor about Narcan (naloxone).   If you are at risk for overdose, you may be given a prescription for Narcan. This medicine very quickly reverses the effects of opioids.   If you overdose, a friend or family member can give you Narcan while waiting for the ambulance. They need to know the signs of overdose and how to give Narcan.     3. Don't use alcohol or street drugs.   Taking them with opioids can cause death.    4. Do not take any of these medicines unless your doctor says it s OK. Taking these with opioids can cause death:    Benzodiazepines, such as lorazepam (Ativan), alprazolam (Xanax) or diazepam (Valium)    Muscle relaxers, such as cyclobenzaprine (Flexeril)    Sleeping pills like zolpidem (Ambien)     Other opioids      How to keep you and other people safe while taking opioids:    1. Never share your opioids with others.  Opioid medicines are regulated by the Drug Enforcement Agency ([employer]). Selling or sharing medications is a criminal act.    2. Be sure to store opioids in a secure place, locked up  if possible. Young children can easily swallow them and overdose.    3. When you are traveling with your medicines, keep them in the original bottles. If you use a pill box, be sure you also carry a copy of your medicine list from your clinic or pharmacy.    4. Safe disposal of opioids    Most pharmacies have places to get rid of medicine, called disposal kiosks. Medicine disposal options are also available in every [facility]. Search your county and  medication disposal  to find more options. You can find more details at:  https://[email or web address].[email or web address]./living-green/managing-unwanted-medications     I agree that my provider, clinic care team, and pharmacy may work with any city, state or federal law enforcement agency that investigates the misuse, sale, or other diversion of my controlled medicine. I will allow my provider to discuss my care with, or share a copy of, this agreement with any other treating provider, pharmacy or emergency room where I receive care.    I have read this agreement and have asked questions about anything I did not understand.    _______________________________________________________  Patient Signature - Vannessa Gupta _____________________                   Date     _______________________________________________________  Provider Signature - Aaliyah Sethi MD   _____________________                   Date     _______________________________________________________  Witness Signature (required if provider not present while patient signing)   _____________________                   Date

## 2022-08-30 LAB — DEPRECATED CALCIDIOL+CALCIFEROL SERPL-MC: 58 UG/L (ref 20–75)

## 2022-09-02 LAB
BKR LAB AP GYN ADEQUACY: NORMAL
BKR LAB AP GYN INTERPRETATION: NORMAL
BKR LAB AP HPV REFLEX: NORMAL
BKR LAB AP PREVIOUS ABNORMAL: NORMAL
PATH REPORT.COMMENTS IMP SPEC: NORMAL
PATH REPORT.COMMENTS IMP SPEC: NORMAL
PATH REPORT.RELEVANT HX SPEC: NORMAL

## 2022-09-04 ENCOUNTER — HEALTH MAINTENANCE LETTER (OUTPATIENT)
Age: 47
End: 2022-09-04

## 2022-09-07 LAB
HUMAN PAPILLOMA VIRUS 16 DNA: NEGATIVE
HUMAN PAPILLOMA VIRUS 18 DNA: NEGATIVE
HUMAN PAPILLOMA VIRUS FINAL DIAGNOSIS: NORMAL
HUMAN PAPILLOMA VIRUS OTHER HR: NEGATIVE

## 2022-10-07 ENCOUNTER — TELEPHONE (OUTPATIENT)
Dept: SURGERY | Facility: OTHER | Age: 47
End: 2022-10-07

## 2022-10-07 ENCOUNTER — VIRTUAL VISIT (OUTPATIENT)
Dept: SURGERY | Facility: OTHER | Age: 47
End: 2022-10-07
Attending: FAMILY MEDICINE
Payer: COMMERCIAL

## 2022-10-07 ENCOUNTER — PREP FOR PROCEDURE (OUTPATIENT)
Dept: SURGERY | Facility: OTHER | Age: 47
End: 2022-10-07

## 2022-10-07 DIAGNOSIS — Z12.11 COLON CANCER SCREENING: Primary | ICD-10-CM

## 2022-10-07 RX ORDER — MAGNESIUM OXIDE 400 MG/1
1 TABLET ORAL DAILY
COMMUNITY
Start: 2022-08-24 | End: 2022-11-23

## 2022-10-07 RX ORDER — PREGABALIN 50 MG/1
100 CAPSULE ORAL 3 TIMES DAILY
COMMUNITY
Start: 2022-09-30 | End: 2023-05-08

## 2022-10-07 RX ORDER — PREGABALIN 50 MG/1
50 CAPSULE ORAL
COMMUNITY
Start: 2022-09-30 | End: 2022-11-23

## 2022-10-07 RX ORDER — POTASSIUM CHLORIDE 750 MG/1
TABLET, EXTENDED RELEASE ORAL
COMMUNITY
Start: 2022-09-26 | End: 2022-11-23

## 2022-10-07 NOTE — PROGRESS NOTES
Referral received for colonoscopy for colon cancer screening.   This patient was not approved by [facility] education RN for meet and greet colonoscopy without preop appointment.   Patient scheduled for colonoscopy on 12/2/22 with Dr. Brown at [facility] with [facility] bowel prep.   Instructions given via phone and sent to patient via mail.   COVID-19 test: at home  Preop: to be determined  Dayana St LPN

## 2022-10-10 RX ORDER — BISACODYL 5 MG/1
TABLET, DELAYED RELEASE ORAL
Qty: 4 TABLET | Refills: 0 | Status: SHIPPED | OUTPATIENT
Start: 2022-10-10 | End: 2023-04-02

## 2022-10-17 ENCOUNTER — PATIENT OUTREACH (OUTPATIENT)
Dept: OTHER | Facility: HOSPITAL | Age: 47
End: 2022-10-17

## 2022-10-18 NOTE — TELEPHONE ENCOUNTER
Patient Quality Outreach    Patient is due for the following:   Depression  -  PHQ-9 needed    Next Steps:   Patient was assigned appropriate questionnaire to complete    Type of outreach:    Sent [email or web address] message.      Questions for provider review:    None     Flaquita Lopez RN

## 2022-11-15 NOTE — H&P (VIEW-ONLY)
[facility] [place]  [address]  [place] [address]  Phone: [phone]  Primary Provider: Leandra Suarez  Pre-op Performing Provider: LEANDRA SUAREZ      PREOPERATIVE EVALUATION:  Today's date: 11/23/2022    Answers for HPI/ROS submitted by the patient on 11/16/2022  If you checked off any problems, how difficult have these problems made it for you to do your work, take care of things at home, or get along with other people?: Somewhat difficult  PHQ9 TOTAL SCORE: 8  NIRAJ 7 TOTAL SCORE: 7    Vannessa Gupta is a 47 year old female who presents for a preoperative evaluation.    Surgical Information:  Surgery/Procedure: Colonoscopy   Surgery Location: [facility] [place]  Surgeon: Dr. Brown  Surgery Date: 12/02/2022  Time of Surgery: TBD  Where patient plans to recover: At home with family  Fax number for surgical facility: Note does not need to be faxed, will be available electronically in Epic.    Type of Anesthesia Anticipated: to be determined    Assessment & Plan     The proposed surgical procedure is considered LOW risk.    Preop general physical exam  COVID home test to be done.  Labs pending.  Proceed as planned.  - Comprehensive metabolic panel (BMP + Alb, Alk Phos, ALT, AST, Total. Bili, TP); Future    Special screening for malignant neoplasms, colon  As above.    Benign essential hypertension  Stable.    Morbid obesity (H)  Concern for sleep apnea.  Never followed through with initial sleep study referral.  Anesthesia to monitor.  New referral placed.  Packet given to complete.  - Adult Sleep Eval & Management  Referral; Future  - Insulin level; Future    Moderate episode of recurrent major depressive disorder (H)  Stable.    Vitamin D deficiency  Normal level 8/2022 - 58.    Insulin resistance syndrome  On max metformin.  Struggling with weight.  Had lost weight with Vyvanse for binge eating, but concern it was causing her cardiomyopathy.  Consider GLP1.  Check  Insulin level.  -  Insulin level; Future    Gastroesophageal reflux disease, unspecified whether esophagitis present  No active symptoms.    Screening for hyperlipidemia  Fasting today.      High priority for 2019-nCoV vaccine  Updated today, along with Influenza and Hep B.    Snoring  As above.  - Adult Sleep Eval & Management  Referral; Future    Hyperlipidemia, unspecified hyperlipidemia type  As above.  - Lipid Profile (Chol, Trig, HDL, LDL calc); Future    Cardiomyopathy, unspecified type (H)  Managed by cardiology.  Unknown etiology - possibly related to prior Vyvanse.    Possible sleep apnea.  Angiogram was negative.  Medical management.             Risks and Recommendations:  The patient has the following additional risks and recommendations for perioperative complications:   - Morbid obesity (BMI >40)  Cardiovascular:   - follows with cardiology -outlined above; cardiomyopathy, unknown etiology  Obstructive Sleep Apnea:   Suspected; sleep study ordered - 2nd time    Medication Instructions:  Patient is to take all scheduled medications on the day of surgery EXCEPT for modifications listed below:   - aspirin: Discontinue aspirin 7-10 days prior to procedure to reduce bleeding risk. It should be resumed postoperatively.    - ACE/ARB: May be continued on the day of surgery.    - Beta Blockers: Continue taking on the day of surgery.   - metformin: HOLD day of surgery.   - ibuprofen (Advil, Motrin): HOLD 1 day before surgery.     RECOMMENDATION:  APPROVAL GIVEN to proceed with proposed procedure, without further diagnostic evaluation.        Subjective     HPI related to upcoming procedure: Patient due for routine colonoscopy, screening.      Preop Questions 11/16/2022   1. Have you ever had a heart attack or stroke? No   2. Have you ever had surgery on your heart or blood vessels, such as a stent placement, a coronary artery bypass, or surgery on an artery in your head, neck, heart, or legs? YES - angiogram only   3. Do  you have chest pain with activity? No   4. Do you have a history of  heart failure? YES - see below - cardiomyopathy   5. Do you currently have a cold, bronchitis or symptoms of other infection? No   6. Do you have a cough, shortness of breath, or wheezing? No   7. Do you or anyone in your family have previous history of blood clots? No   8. Do you or does anyone in your family have a serious bleeding problem such as prolonged bleeding following surgeries or cuts? No   9. Have you ever had problems with anemia or been told to take iron pills? YES - remote   10. Have you had any abnormal blood loss such as black, tarry or bloody stools, or abnormal vaginal bleeding? No   11. Have you ever had a blood transfusion? No   12. Are you willing to have a blood transfusion if it is medically needed before, during, or after your surgery? Yes   13. Have you or any of your relatives ever had problems with anesthesia? No   14. Do you have sleep apnea, excessive snoring or daytime drowsiness? YES - presumed;  Never completed sleep study; got covid and never rescheduled; snores; no witnessed apnea   14a. Do you have a CPAP machine? No   15. Do you have any artifical heart valves or other implanted medical devices like a pacemaker, defibrillator, or continuous glucose monitor? No   16. Do you have artificial joints? YES - bilateral hips   17. Are you allergic to latex? No   18. Is there any chance that you may be pregnant? No     Health Care Directive:  Patient does not have a Health Care Directive or Living Will: Discussed advance care planning with patient; however, patient declined at this time.    Preoperative Review of :   reviewed - controlled substances prescribed by other outside provider(s).    Ernestina De Leon.  Nerve pain after hip replacements.    Status of Chronic Conditions:  See problem list for active medical problems.  Problems all longstanding and stable, except as noted/documented.  See ROS for  pertinent symptoms related to these conditions.    DEPRESSION - Patient has a long history of Depression of moderate severity requiring medication for control with recent symptoms being stable.    HYPERLIPIDEMIA - Patient has a history of Hyperlipidemia not yet requiring medication for treatment with recent fair control.     HYPERTENSION - Patient has longstanding history of HTN , currently denies any symptoms referable to elevated blood pressure. Specifically denies chest pain, palpitations, dyspnea, orthopnea, PND or peripheral edema. Blood pressure readings have been in normal range. Current medication regimen is as listed below. Patient denies any side effects of medication.     GERD - prior.  Not active.    Cardiac - CHF - most recent Echo 1/2022 - EF 40-45%.  Cardiomyopathy - etiology unkown.  Possibly from prior Vyvanse.  Prior cardiac catheterization - negative.  No CAD.  Dr Riojas. [facility] - follows with Sydney Rae.  See Care Everywhere.    OA - hips s/p bilateral replacement; planning knees next; on Lyrica.  For left leg nerve pain.    OBESITY - binge eating; Vyvanse prior -helpful.  Unable to continue due to cardiac side effects.  On max Metformin for insulin resistance.  Insulin level obtained.  Consider GLP vs SGLT2 - SGLT2 for CHF as well.  Would check with cardiology provider.    Review of Systems  Constitutional, neuro, ENT, endocrine, pulmonary, cardiac, gastrointestinal, genitourinary, musculoskeletal, integument and psychiatric systems are negative, except as otherwise noted.    Patient Active Problem List    Diagnosis Date Noted     Other specified anemias 04/20/2022     Priority: Medium     Cardiomyopathy, unspecified type (H) 04/11/2022     Priority: Medium     Formatting of this note might be different from the original.  Added automatically from request for surgery [id number]       Obstructive sleep apnea syndrome 04/11/2022     Priority: Medium     Formatting of this note might be different  from the original.  Added automatically from request for surgery [id number]       Status post total replacement of right hip 02/14/2022     Priority: Medium     Arthritis of left hip 01/06/2022     Priority: Medium     Formatting of this note might be different from the original.  Added automatically from request for surgery [id number]       Opioid use, unspecified, uncomplicated 01/06/2022     Priority: Medium     Chronic pain of both knees 10/26/2021     Priority: Medium     Knee joint stiffness, bilateral 10/26/2021     Priority: Medium     Muscle weakness 10/26/2021     Priority: Medium     Premenopausal patient 09/30/2021     Priority: Medium     Osteoarthritis of both hips 09/17/2020     Priority: Medium     Menorrhagia with regular cycle--US normal--PROVERA daily---10/2019 10/23/2019     Priority: Medium     Morbid obesity (H) 10/10/2019     Priority: Medium     Insulin resistance syndrome 09/20/2018     Priority: Medium     Benign essential hypertension 11/30/2015     Priority: Medium     Vitamin D deficiency 11/30/2015     Priority: Medium     Major depressive disorder, recurrent episode (H) 07/14/2011     Priority: Medium     Problem list name updated by automated process. Provider to review       GERD (gastroesophageal reflux diseae) 07/14/2011     Priority: Medium      Past Medical History:   Diagnosis Date     Benign essential hypertension 11/30/2015     Cardiomyopathy, unspecified (H)     right and left cath to be done; [facility] -Dr Shafer; etiology? TROY, Vyvanse, other     Depressive disorder      GERD (gastroesophageal reflux diseae) 07/14/2011     Heart failure with reduced ejection fraction (H)     echo 1/31/2022; [facility]; EF 40-45%; mild hypokinesis LV; trace TR     Major depressive affective disorder, recurrent episode, unspecified 07/14/2011     Obesity  07/14/2011     Primary osteoarthritis of both hips     Dr De Leon; [facility]; planning total hip arthroplasty; goal weight first 250     Vitamin D  deficiency 2015     Past Surgical History:   Procedure Laterality Date     ABDOMEN SURGERY  2005    I have had 2 c sections     ANGIOGRAM  2020    LHC - normal RCA/LCX; LAD luminal irregularities; Dr Hair; [facility]; plan cMRI     bilateral tubal ligation        section  ,         ENT SURGERY      Got my tonsils out     GYN SURGERY  2005         oligohydramnios       ORTHOPEDIC SURGERY  22 & 3-28-22    Total hip replacement left and right     Tonsillectomy       TOTAL HIP ARTHROPLASTY Right 2022    Dr De Leon     TOTAL HIP ARTHROPLASTY Left 2022    Dr De Leon     VASCULAR SURGERY  2022    Cath of both left and right side     Current Outpatient Medications   Medication Sig Dispense Refill     cholecalciferol (VITAMIN D3) 5000 units (125 mcg) capsule Take 5,000 Units by mouth 2 times daily        ferrous fumarate 65 mg, [place]. FE,-Vitamin C 125 mg (VITRON C)  MG TABS tablet        losartan (COZAAR) 25 MG tablet Take 1 tablet (25 mg) by mouth daily 90 tablet 3     magnesium oxide (MAG-OX) 400 MG tablet Take 1 tablet by mouth daily       magnesium oxide (MAG-OX) 400 MG tablet Take 400 mg by mouth       medroxyPROGESTERone (PROVERA) 10 MG tablet Take 1 tablet (10 mg) by mouth daily 90 tablet 3     metFORMIN (GLUCOPHAGE XR) 500 MG 24 hr tablet Take 2 tablets (1,000 mg) by mouth 2 times daily (with meals) 120 tablet 3     metoprolol succinate ER (TOPROL-XL) 25 MG 24 hr tablet Take 25 mg by mouth       potassium chloride ER (K-TAB/KLOR-CON) 10 MEQ CR tablet Take 10 mEq by mouth       pregabalin (LYRICA) 50 MG capsule 100 mg 3 times daily       sennosides (SENOKOT) 8.6 MG tablet        sertraline (ZOLOFT) 100 MG tablet TAKE 1 AND ONE-HALF TABLETS (150MG) BY MOUTH DAILY 135 tablet 3     bisacodyl (DULCOLAX) 5 MG EC tablet Take 2 tabs at bedtime 2 nights prior to procedure. Take 2 tabs at 3pm the day before procedure. (Patient not  taking: Reported on 2022) 4 tablet 0     polyethylene glycol (GOLYTELY) 236 g suspension Drink 8 ounces every 10 minutes until the jug is half-empty at 6pm the night before procedure. Refrigerate. Repeat 6 hours prior to procedure. (Patient not taking: Reported on 2022) 4000 mL 0       Allergies   Allergen Reactions     Iodine Other (See Comments) and Rash     (shrimp) lips swollen        Social History     Tobacco Use     Smoking status: Former     Packs/day: 0.00     Years: 1.00     Pack years: 0.00     Types: Cigarettes     Quit date: 2004     Years since quittin.9     Smokeless tobacco: Never     Tobacco comments:     Tried to quit   Substance Use Topics     Alcohol use: Yes     Comment: hasnt drank in a year     Family History   Problem Relation Age of Onset     Cancer Mother         Cervical     Depression Mother      Anxiety Disorder Mother      Osteoporosis Mother      Depression Father      Diabetes Father      Hypertension Father      Other - See Comments Father         Cholecystectomy/Rheumatoid arthritis     Heart Disease Father      LUNG DISEASE Father      Liver Disease Father      Coronary Artery Disease Father      Cerebrovascular Disease Father      Anxiety Disorder Father      Osteoporosis Father      Other - See Comments Sister         Endometriosis     Anxiety Disorder Sister      Fibromyalgia Other         mom and sister     Asthma No family hx of      Thyroid Disease No family hx of      History   Drug Use No         Objective     LMP 2021     Physical Exam    GENERAL APPEARANCE: alert, no distress, cooperative and morbidly obese     EYES: EOMI, PERRL     HENT: ear canals and TM's normal and nose and mouth without ulcers or lesions     NECK: no adenopathy, no asymmetry, masses, or scars and thyroid normal to palpation     RESP: lungs clear to auscultation - no rales, rhonchi or wheezes     CV: regular rates and rhythm, normal S1 S2, no S3 or S4 and no murmur, click  or rub     ABDOMEN:  soft, nontender, no HSM or masses and bowel sounds normal     MS: extremities normal- no gross deformities noted, no evidence of inflammation in joints, FROM in all extremities.     SKIN: no suspicious lesions or rashes     NEURO: Normal strength and tone, sensory exam grossly normal, mentation intact and speech normal     PSYCH: mentation appears normal. and affect normal/bright     LYMPHATICS: No cervical adenopathy    Recent Labs   Lab Test 08/29/22  1036 06/23/21  1156 01/15/21  0754   HGB 12.1  --  13.1   *  --  419    137 141   POTASSIUM 3.5 3.9 3.9   CR 0.58 0.59 0.62   A1C 5.5  --  5.2        Diagnostics:  Labs pending at this time.  Results will be reviewed when available.   No EKG required for low risk surgery (cataract, skin procedure, breast biopsy, etc).    Revised Cardiac Risk Index (RCRI):  The patient has the following serious cardiovascular risks for perioperative complications:   - Congestive Heart Failure (pulmonary edema, PND, s3 vivienne, CXR with pulmonary congestion, basilar rales) = 1 point     RCRI Interpretation: 1 point: Class II (low risk - 0.9% complication rate)           Signed Electronically by: Aaliyah Sethi MD  Copy of this evaluation report is provided to requesting physician.

## 2022-11-15 NOTE — PROGRESS NOTES
[facility] [place]  [address]  [place] [address]  Phone: [phone]  Primary Provider: Leandra Suarez  Pre-op Performing Provider: LEANDRA SUAREZ      PREOPERATIVE EVALUATION:  Today's date: 11/23/2022    Answers for HPI/ROS submitted by the patient on 11/16/2022  If you checked off any problems, how difficult have these problems made it for you to do your work, take care of things at home, or get along with other people?: Somewhat difficult  PHQ9 TOTAL SCORE: 8  NIRAJ 7 TOTAL SCORE: 7    Vannessa Gupta is a 47 year old female who presents for a preoperative evaluation.    Surgical Information:  Surgery/Procedure: Colonoscopy   Surgery Location: [facility] [place]  Surgeon: Dr. Brown  Surgery Date: 12/02/2022  Time of Surgery: TBD  Where patient plans to recover: At home with family  Fax number for surgical facility: Note does not need to be faxed, will be available electronically in Epic.    Type of Anesthesia Anticipated: to be determined    Assessment & Plan     The proposed surgical procedure is considered LOW risk.    Preop general physical exam  COVID home test to be done.  Labs pending.  Proceed as planned.  - Comprehensive metabolic panel (BMP + Alb, Alk Phos, ALT, AST, Total. Bili, TP); Future    Special screening for malignant neoplasms, colon  As above.    Benign essential hypertension  Stable.    Morbid obesity (H)  Concern for sleep apnea.  Never followed through with initial sleep study referral.  Anesthesia to monitor.  New referral placed.  Packet given to complete.  - Adult Sleep Eval & Management  Referral; Future  - Insulin level; Future    Moderate episode of recurrent major depressive disorder (H)  Stable.    Vitamin D deficiency  Normal level 8/2022 - 58.    Insulin resistance syndrome  On max metformin.  Struggling with weight.  Had lost weight with Vyvanse for binge eating, but concern it was causing her cardiomyopathy.  Consider GLP1.  Check  Insulin level.  -  Insulin level; Future    Gastroesophageal reflux disease, unspecified whether esophagitis present  No active symptoms.    Screening for hyperlipidemia  Fasting today.      High priority for 2019-nCoV vaccine  Updated today, along with Influenza and Hep B.    Snoring  As above.  - Adult Sleep Eval & Management  Referral; Future    Hyperlipidemia, unspecified hyperlipidemia type  As above.  - Lipid Profile (Chol, Trig, HDL, LDL calc); Future    Cardiomyopathy, unspecified type (H)  Managed by cardiology.  Unknown etiology - possibly related to prior Vyvanse.    Possible sleep apnea.  Angiogram was negative.  Medical management.             Risks and Recommendations:  The patient has the following additional risks and recommendations for perioperative complications:   - Morbid obesity (BMI >40)  Cardiovascular:   - follows with cardiology -outlined above; cardiomyopathy, unknown etiology  Obstructive Sleep Apnea:   Suspected; sleep study ordered - 2nd time    Medication Instructions:  Patient is to take all scheduled medications on the day of surgery EXCEPT for modifications listed below:   - aspirin: Discontinue aspirin 7-10 days prior to procedure to reduce bleeding risk. It should be resumed postoperatively.    - ACE/ARB: May be continued on the day of surgery.    - Beta Blockers: Continue taking on the day of surgery.   - metformin: HOLD day of surgery.   - ibuprofen (Advil, Motrin): HOLD 1 day before surgery.     RECOMMENDATION:  APPROVAL GIVEN to proceed with proposed procedure, without further diagnostic evaluation.        Subjective     HPI related to upcoming procedure: Patient due for routine colonoscopy, screening.      Preop Questions 11/16/2022   1. Have you ever had a heart attack or stroke? No   2. Have you ever had surgery on your heart or blood vessels, such as a stent placement, a coronary artery bypass, or surgery on an artery in your head, neck, heart, or legs? YES - angiogram only   3. Do  you have chest pain with activity? No   4. Do you have a history of  heart failure? YES - see below - cardiomyopathy   5. Do you currently have a cold, bronchitis or symptoms of other infection? No   6. Do you have a cough, shortness of breath, or wheezing? No   7. Do you or anyone in your family have previous history of blood clots? No   8. Do you or does anyone in your family have a serious bleeding problem such as prolonged bleeding following surgeries or cuts? No   9. Have you ever had problems with anemia or been told to take iron pills? YES - remote   10. Have you had any abnormal blood loss such as black, tarry or bloody stools, or abnormal vaginal bleeding? No   11. Have you ever had a blood transfusion? No   12. Are you willing to have a blood transfusion if it is medically needed before, during, or after your surgery? Yes   13. Have you or any of your relatives ever had problems with anesthesia? No   14. Do you have sleep apnea, excessive snoring or daytime drowsiness? YES - presumed;  Never completed sleep study; got covid and never rescheduled; snores; no witnessed apnea   14a. Do you have a CPAP machine? No   15. Do you have any artifical heart valves or other implanted medical devices like a pacemaker, defibrillator, or continuous glucose monitor? No   16. Do you have artificial joints? YES - bilateral hips   17. Are you allergic to latex? No   18. Is there any chance that you may be pregnant? No     Health Care Directive:  Patient does not have a Health Care Directive or Living Will: Discussed advance care planning with patient; however, patient declined at this time.    Preoperative Review of :   reviewed - controlled substances prescribed by other outside provider(s).    Ernestina De Leon.  Nerve pain after hip replacements.    Status of Chronic Conditions:  See problem list for active medical problems.  Problems all longstanding and stable, except as noted/documented.  See ROS for  pertinent symptoms related to these conditions.    DEPRESSION - Patient has a long history of Depression of moderate severity requiring medication for control with recent symptoms being stable.    HYPERLIPIDEMIA - Patient has a history of Hyperlipidemia not yet requiring medication for treatment with recent fair control.     HYPERTENSION - Patient has longstanding history of HTN , currently denies any symptoms referable to elevated blood pressure. Specifically denies chest pain, palpitations, dyspnea, orthopnea, PND or peripheral edema. Blood pressure readings have been in normal range. Current medication regimen is as listed below. Patient denies any side effects of medication.     GERD - prior.  Not active.    Cardiac - CHF - most recent Echo 1/2022 - EF 40-45%.  Cardiomyopathy - etiology unkown.  Possibly from prior Vyvanse.  Prior cardiac catheterization - negative.  No CAD.  Dr Riojas. [facility] - follows with Sydney Rae.  See Care Everywhere.    OA - hips s/p bilateral replacement; planning knees next; on Lyrica.  For left leg nerve pain.    OBESITY - binge eating; Vyvanse prior -helpful.  Unable to continue due to cardiac side effects.  On max Metformin for insulin resistance.  Insulin level obtained.  Consider GLP vs SGLT2 - SGLT2 for CHF as well.  Would check with cardiology provider.    Review of Systems  Constitutional, neuro, ENT, endocrine, pulmonary, cardiac, gastrointestinal, genitourinary, musculoskeletal, integument and psychiatric systems are negative, except as otherwise noted.    Patient Active Problem List    Diagnosis Date Noted     Other specified anemias 04/20/2022     Priority: Medium     Cardiomyopathy, unspecified type (H) 04/11/2022     Priority: Medium     Formatting of this note might be different from the original.  Added automatically from request for surgery [id number]       Obstructive sleep apnea syndrome 04/11/2022     Priority: Medium     Formatting of this note might be different  from the original.  Added automatically from request for surgery [id number]       Status post total replacement of right hip 02/14/2022     Priority: Medium     Arthritis of left hip 01/06/2022     Priority: Medium     Formatting of this note might be different from the original.  Added automatically from request for surgery [id number]       Opioid use, unspecified, uncomplicated 01/06/2022     Priority: Medium     Chronic pain of both knees 10/26/2021     Priority: Medium     Knee joint stiffness, bilateral 10/26/2021     Priority: Medium     Muscle weakness 10/26/2021     Priority: Medium     Premenopausal patient 09/30/2021     Priority: Medium     Osteoarthritis of both hips 09/17/2020     Priority: Medium     Menorrhagia with regular cycle--US normal--PROVERA daily---10/2019 10/23/2019     Priority: Medium     Morbid obesity (H) 10/10/2019     Priority: Medium     Insulin resistance syndrome 09/20/2018     Priority: Medium     Benign essential hypertension 11/30/2015     Priority: Medium     Vitamin D deficiency 11/30/2015     Priority: Medium     Major depressive disorder, recurrent episode (H) 07/14/2011     Priority: Medium     Problem list name updated by automated process. Provider to review       GERD (gastroesophageal reflux diseae) 07/14/2011     Priority: Medium      Past Medical History:   Diagnosis Date     Benign essential hypertension 11/30/2015     Cardiomyopathy, unspecified (H)     right and left cath to be done; [facility] -Dr Shafer; etiology? TROY, Vyvanse, other     Depressive disorder      GERD (gastroesophageal reflux diseae) 07/14/2011     Heart failure with reduced ejection fraction (H)     echo 1/31/2022; [facility]; EF 40-45%; mild hypokinesis LV; trace TR     Major depressive affective disorder, recurrent episode, unspecified 07/14/2011     Obesity  07/14/2011     Primary osteoarthritis of both hips     Dr De Leon; [facility]; planning total hip arthroplasty; goal weight first 250     Vitamin D  deficiency 2015     Past Surgical History:   Procedure Laterality Date     ABDOMEN SURGERY  2005    I have had 2 c sections     ANGIOGRAM  2020    LHC - normal RCA/LCX; LAD luminal irregularities; Dr Hair; [facility]; plan cMRI     bilateral tubal ligation        section  ,         ENT SURGERY      Got my tonsils out     GYN SURGERY  2005         oligohydramnios       ORTHOPEDIC SURGERY  22 & 3-28-22    Total hip replacement left and right     Tonsillectomy       TOTAL HIP ARTHROPLASTY Right 2022    Dr De Leon     TOTAL HIP ARTHROPLASTY Left 2022    Dr De Leon     VASCULAR SURGERY  2022    Cath of both left and right side     Current Outpatient Medications   Medication Sig Dispense Refill     cholecalciferol (VITAMIN D3) 5000 units (125 mcg) capsule Take 5,000 Units by mouth 2 times daily        ferrous fumarate 65 mg, [place]. FE,-Vitamin C 125 mg (VITRON C)  MG TABS tablet        losartan (COZAAR) 25 MG tablet Take 1 tablet (25 mg) by mouth daily 90 tablet 3     magnesium oxide (MAG-OX) 400 MG tablet Take 1 tablet by mouth daily       magnesium oxide (MAG-OX) 400 MG tablet Take 400 mg by mouth       medroxyPROGESTERone (PROVERA) 10 MG tablet Take 1 tablet (10 mg) by mouth daily 90 tablet 3     metFORMIN (GLUCOPHAGE XR) 500 MG 24 hr tablet Take 2 tablets (1,000 mg) by mouth 2 times daily (with meals) 120 tablet 3     metoprolol succinate ER (TOPROL-XL) 25 MG 24 hr tablet Take 25 mg by mouth       potassium chloride ER (K-TAB/KLOR-CON) 10 MEQ CR tablet Take 10 mEq by mouth       pregabalin (LYRICA) 50 MG capsule 100 mg 3 times daily       sennosides (SENOKOT) 8.6 MG tablet        sertraline (ZOLOFT) 100 MG tablet TAKE 1 AND ONE-HALF TABLETS (150MG) BY MOUTH DAILY 135 tablet 3     bisacodyl (DULCOLAX) 5 MG EC tablet Take 2 tabs at bedtime 2 nights prior to procedure. Take 2 tabs at 3pm the day before procedure. (Patient not  taking: Reported on 2022) 4 tablet 0     polyethylene glycol (GOLYTELY) 236 g suspension Drink 8 ounces every 10 minutes until the jug is half-empty at 6pm the night before procedure. Refrigerate. Repeat 6 hours prior to procedure. (Patient not taking: Reported on 2022) 4000 mL 0       Allergies   Allergen Reactions     Iodine Other (See Comments) and Rash     (shrimp) lips swollen        Social History     Tobacco Use     Smoking status: Former     Packs/day: 0.00     Years: 1.00     Pack years: 0.00     Types: Cigarettes     Quit date: 2004     Years since quittin.9     Smokeless tobacco: Never     Tobacco comments:     Tried to quit   Substance Use Topics     Alcohol use: Yes     Comment: hasnt drank in a year     Family History   Problem Relation Age of Onset     Cancer Mother         Cervical     Depression Mother      Anxiety Disorder Mother      Osteoporosis Mother      Depression Father      Diabetes Father      Hypertension Father      Other - See Comments Father         Cholecystectomy/Rheumatoid arthritis     Heart Disease Father      LUNG DISEASE Father      Liver Disease Father      Coronary Artery Disease Father      Cerebrovascular Disease Father      Anxiety Disorder Father      Osteoporosis Father      Other - See Comments Sister         Endometriosis     Anxiety Disorder Sister      Fibromyalgia Other         mom and sister     Asthma No family hx of      Thyroid Disease No family hx of      History   Drug Use No         Objective     LMP 2021     Physical Exam    GENERAL APPEARANCE: alert, no distress, cooperative and morbidly obese     EYES: EOMI, PERRL     HENT: ear canals and TM's normal and nose and mouth without ulcers or lesions     NECK: no adenopathy, no asymmetry, masses, or scars and thyroid normal to palpation     RESP: lungs clear to auscultation - no rales, rhonchi or wheezes     CV: regular rates and rhythm, normal S1 S2, no S3 or S4 and no murmur, click  or rub     ABDOMEN:  soft, nontender, no HSM or masses and bowel sounds normal     MS: extremities normal- no gross deformities noted, no evidence of inflammation in joints, FROM in all extremities.     SKIN: no suspicious lesions or rashes     NEURO: Normal strength and tone, sensory exam grossly normal, mentation intact and speech normal     PSYCH: mentation appears normal. and affect normal/bright     LYMPHATICS: No cervical adenopathy    Recent Labs   Lab Test 08/29/22  1036 06/23/21  1156 01/15/21  0754   HGB 12.1  --  13.1   *  --  419    137 141   POTASSIUM 3.5 3.9 3.9   CR 0.58 0.59 0.62   A1C 5.5  --  5.2        Diagnostics:  Labs pending at this time.  Results will be reviewed when available.   No EKG required for low risk surgery (cataract, skin procedure, breast biopsy, etc).    Revised Cardiac Risk Index (RCRI):  The patient has the following serious cardiovascular risks for perioperative complications:   - Congestive Heart Failure (pulmonary edema, PND, s3 vivienne, CXR with pulmonary congestion, basilar rales) = 1 point     RCRI Interpretation: 1 point: Class II (low risk - 0.9% complication rate)           Signed Electronically by: Aaliyah Sethi MD  Copy of this evaluation report is provided to requesting physician.

## 2022-11-16 ASSESSMENT — ANXIETY QUESTIONNAIRES
2. NOT BEING ABLE TO STOP OR CONTROL WORRYING: SEVERAL DAYS
3. WORRYING TOO MUCH ABOUT DIFFERENT THINGS: SEVERAL DAYS
GAD7 TOTAL SCORE: 7
7. FEELING AFRAID AS IF SOMETHING AWFUL MIGHT HAPPEN: SEVERAL DAYS
4. TROUBLE RELAXING: SEVERAL DAYS
7. FEELING AFRAID AS IF SOMETHING AWFUL MIGHT HAPPEN: SEVERAL DAYS
5. BEING SO RESTLESS THAT IT IS HARD TO SIT STILL: NOT AT ALL
6. BECOMING EASILY ANNOYED OR IRRITABLE: MORE THAN HALF THE DAYS
IF YOU CHECKED OFF ANY PROBLEMS ON THIS QUESTIONNAIRE, HOW DIFFICULT HAVE THESE PROBLEMS MADE IT FOR YOU TO DO YOUR WORK, TAKE CARE OF THINGS AT HOME, OR GET ALONG WITH OTHER PEOPLE: SOMEWHAT DIFFICULT
GAD7 TOTAL SCORE: 7
8. IF YOU CHECKED OFF ANY PROBLEMS, HOW DIFFICULT HAVE THESE MADE IT FOR YOU TO DO YOUR WORK, TAKE CARE OF THINGS AT HOME, OR GET ALONG WITH OTHER PEOPLE?: SOMEWHAT DIFFICULT
1. FEELING NERVOUS, ANXIOUS, OR ON EDGE: SEVERAL DAYS
GAD7 TOTAL SCORE: 7

## 2022-11-16 ASSESSMENT — PATIENT HEALTH QUESTIONNAIRE - PHQ9
SUM OF ALL RESPONSES TO PHQ QUESTIONS 1-9: 8
SUM OF ALL RESPONSES TO PHQ QUESTIONS 1-9: 8
10. IF YOU CHECKED OFF ANY PROBLEMS, HOW DIFFICULT HAVE THESE PROBLEMS MADE IT FOR YOU TO DO YOUR WORK, TAKE CARE OF THINGS AT HOME, OR GET ALONG WITH OTHER PEOPLE: SOMEWHAT DIFFICULT

## 2022-11-23 ENCOUNTER — ANESTHESIA EVENT (OUTPATIENT)
Dept: SURGERY | Facility: HOSPITAL | Age: 47
End: 2022-11-23
Payer: COMMERCIAL

## 2022-11-23 ENCOUNTER — OFFICE VISIT (OUTPATIENT)
Dept: FAMILY MEDICINE | Facility: OTHER | Age: 47
End: 2022-11-23
Attending: FAMILY MEDICINE
Payer: COMMERCIAL

## 2022-11-23 VITALS
TEMPERATURE: 98.6 F | WEIGHT: 282 LBS | OXYGEN SATURATION: 98 % | HEART RATE: 69 BPM | HEIGHT: 66 IN | BODY MASS INDEX: 45.32 KG/M2 | SYSTOLIC BLOOD PRESSURE: 120 MMHG | DIASTOLIC BLOOD PRESSURE: 82 MMHG

## 2022-11-23 DIAGNOSIS — Z01.818 PREOP GENERAL PHYSICAL EXAM: Primary | ICD-10-CM

## 2022-11-23 DIAGNOSIS — K21.9 GASTROESOPHAGEAL REFLUX DISEASE, UNSPECIFIED WHETHER ESOPHAGITIS PRESENT: ICD-10-CM

## 2022-11-23 DIAGNOSIS — R06.83 SNORING: ICD-10-CM

## 2022-11-23 DIAGNOSIS — E55.9 VITAMIN D DEFICIENCY: ICD-10-CM

## 2022-11-23 DIAGNOSIS — I10 BENIGN ESSENTIAL HYPERTENSION: ICD-10-CM

## 2022-11-23 DIAGNOSIS — I42.9 CARDIOMYOPATHY, UNSPECIFIED TYPE (H): ICD-10-CM

## 2022-11-23 DIAGNOSIS — E88.810 INSULIN RESISTANCE SYNDROME: ICD-10-CM

## 2022-11-23 DIAGNOSIS — E66.01 MORBID OBESITY (H): ICD-10-CM

## 2022-11-23 DIAGNOSIS — F33.1 MODERATE EPISODE OF RECURRENT MAJOR DEPRESSIVE DISORDER (H): ICD-10-CM

## 2022-11-23 DIAGNOSIS — Z12.11 SPECIAL SCREENING FOR MALIGNANT NEOPLASMS, COLON: ICD-10-CM

## 2022-11-23 DIAGNOSIS — E78.5 HYPERLIPIDEMIA, UNSPECIFIED HYPERLIPIDEMIA TYPE: ICD-10-CM

## 2022-11-23 DIAGNOSIS — Z13.220 SCREENING FOR HYPERLIPIDEMIA: ICD-10-CM

## 2022-11-23 DIAGNOSIS — Z23 HIGH PRIORITY FOR 2019-NCOV VACCINE: ICD-10-CM

## 2022-11-23 LAB
ALBUMIN SERPL BCG-MCNC: 3.9 G/DL (ref 3.5–5.2)
ALP SERPL-CCNC: 78 U/L (ref 35–104)
ALT SERPL W P-5'-P-CCNC: 12 U/L (ref 10–35)
ANION GAP SERPL CALCULATED.3IONS-SCNC: 11 MMOL/L (ref 7–15)
AST SERPL W P-5'-P-CCNC: 15 U/L (ref 10–35)
BILIRUB SERPL-MCNC: 0.3 MG/DL
BUN SERPL-MCNC: 7.6 MG/DL (ref 6–20)
CALCIUM SERPL-MCNC: 9 MG/DL (ref 8.6–10)
CHLORIDE SERPL-SCNC: 105 MMOL/L (ref 98–107)
CHOLEST SERPL-MCNC: 185 MG/DL
CREAT SERPL-MCNC: 0.59 MG/DL (ref 0.51–0.95)
DEPRECATED HCO3 PLAS-SCNC: 24 MMOL/L (ref 22–29)
GFR SERPL CREATININE-BSD FRML MDRD: >90 ML/MIN/1.73M2
GLUCOSE SERPL-MCNC: 98 MG/DL (ref 70–99)
HDLC SERPL-MCNC: 36 MG/DL
LDLC SERPL CALC-MCNC: 119 MG/DL
NONHDLC SERPL-MCNC: 149 MG/DL
POTASSIUM SERPL-SCNC: 3.7 MMOL/L (ref 3.4–5.3)
PROT SERPL-MCNC: 7.6 G/DL (ref 6.4–8.3)
SODIUM SERPL-SCNC: 140 MMOL/L (ref 136–145)
TRIGL SERPL-MCNC: 151 MG/DL

## 2022-11-23 PROCEDURE — 90686 IIV4 VACC NO PRSV 0.5 ML IM: CPT | Performed by: FAMILY MEDICINE

## 2022-11-23 PROCEDURE — 90746 HEPB VACCINE 3 DOSE ADULT IM: CPT | Performed by: FAMILY MEDICINE

## 2022-11-23 PROCEDURE — 36415 COLL VENOUS BLD VENIPUNCTURE: CPT | Performed by: FAMILY MEDICINE

## 2022-11-23 PROCEDURE — 90471 IMMUNIZATION ADMIN: CPT | Performed by: FAMILY MEDICINE

## 2022-11-23 PROCEDURE — 90472 IMMUNIZATION ADMIN EACH ADD: CPT | Performed by: FAMILY MEDICINE

## 2022-11-23 PROCEDURE — 80061 LIPID PANEL: CPT | Performed by: FAMILY MEDICINE

## 2022-11-23 PROCEDURE — 99214 OFFICE O/P EST MOD 30 MIN: CPT | Mod: 25 | Performed by: FAMILY MEDICINE

## 2022-11-23 PROCEDURE — 91312 COVID-19 VACCINE BIVALENT BOOSTER 12+ (PFIZER): CPT | Performed by: FAMILY MEDICINE

## 2022-11-23 PROCEDURE — 80053 COMPREHEN METABOLIC PANEL: CPT | Performed by: FAMILY MEDICINE

## 2022-11-23 PROCEDURE — 83525 ASSAY OF INSULIN: CPT | Performed by: FAMILY MEDICINE

## 2022-11-23 PROCEDURE — 0124A COVID-19 VACCINE BIVALENT BOOSTER 12+ (PFIZER): CPT | Performed by: FAMILY MEDICINE

## 2022-11-23 RX ORDER — FENTANYL CITRATE 50 UG/ML
25 INJECTION, SOLUTION INTRAMUSCULAR; INTRAVENOUS
Status: CANCELLED | OUTPATIENT
Start: 2022-11-23

## 2022-11-23 RX ORDER — HYDRALAZINE HYDROCHLORIDE 20 MG/ML
5-10 INJECTION INTRAMUSCULAR; INTRAVENOUS EVERY 10 MIN PRN
Status: CANCELLED | OUTPATIENT
Start: 2022-11-23

## 2022-11-23 RX ORDER — FENTANYL CITRATE 50 UG/ML
50 INJECTION, SOLUTION INTRAMUSCULAR; INTRAVENOUS EVERY 5 MIN PRN
Status: CANCELLED | OUTPATIENT
Start: 2022-11-23

## 2022-11-23 RX ORDER — MEPERIDINE HYDROCHLORIDE 25 MG/ML
12.5 INJECTION INTRAMUSCULAR; INTRAVENOUS; SUBCUTANEOUS
Status: CANCELLED | OUTPATIENT
Start: 2022-11-23

## 2022-11-23 RX ORDER — ONDANSETRON 2 MG/ML
4 INJECTION INTRAMUSCULAR; INTRAVENOUS EVERY 30 MIN PRN
Status: CANCELLED | OUTPATIENT
Start: 2022-11-23

## 2022-11-23 RX ORDER — HYDROMORPHONE HYDROCHLORIDE 1 MG/ML
0.2 INJECTION, SOLUTION INTRAMUSCULAR; INTRAVENOUS; SUBCUTANEOUS EVERY 5 MIN PRN
Status: CANCELLED | OUTPATIENT
Start: 2022-11-23

## 2022-11-23 RX ORDER — FENTANYL CITRATE 50 UG/ML
25 INJECTION, SOLUTION INTRAMUSCULAR; INTRAVENOUS EVERY 5 MIN PRN
Status: CANCELLED | OUTPATIENT
Start: 2022-11-23

## 2022-11-23 RX ORDER — HYDROMORPHONE HYDROCHLORIDE 1 MG/ML
0.4 INJECTION, SOLUTION INTRAMUSCULAR; INTRAVENOUS; SUBCUTANEOUS EVERY 5 MIN PRN
Status: CANCELLED | OUTPATIENT
Start: 2022-11-23

## 2022-11-23 RX ORDER — HALOPERIDOL 5 MG/ML
0.5 INJECTION INTRAMUSCULAR
Status: CANCELLED | OUTPATIENT
Start: 2022-11-23

## 2022-11-23 RX ORDER — ONDANSETRON 4 MG/1
4 TABLET, ORALLY DISINTEGRATING ORAL EVERY 30 MIN PRN
Status: CANCELLED | OUTPATIENT
Start: 2022-11-23

## 2022-11-23 RX ORDER — SODIUM CHLORIDE, SODIUM LACTATE, POTASSIUM CHLORIDE, CALCIUM CHLORIDE 600; 310; 30; 20 MG/100ML; MG/100ML; MG/100ML; MG/100ML
INJECTION, SOLUTION INTRAVENOUS CONTINUOUS
Status: CANCELLED | OUTPATIENT
Start: 2022-11-23

## 2022-11-23 ASSESSMENT — PAIN SCALES - GENERAL: PAINLEVEL: NO PAIN (0)

## 2022-11-23 NOTE — PATIENT INSTRUCTIONS
Do not take Metformin morning of surgery.  Will call with lab results.  New referral for sleep study - complete packet and mail in.  Vaccines updated - COVID, flu, hep B.    Preparing for Your Surgery  Getting started  A nurse will call you to review your health history and instructions. They will give you an arrival time based on your scheduled surgery time. Please be ready to share:  Your doctor s clinic name and phone number  Your medical, surgical, and anesthesia history  A list of allergies and sensitivities  A list of medicines, including herbal treatments and over-the-counter drugs  Whether the patient has a legal guardian (ask how to send us the papers in advance)  Please tell us if you re pregnant--or if there s any chance you might be pregnant. Some surgeries may injure a fetus (unborn baby), so they require a pregnancy test. Surgeries that are safe for a fetus don t always need a test, and you can choose whether to have one.   If you have a child who s having surgery, please ask for a copy of Preparing for Your Child s Surgery.    Preparing for surgery  Within 10 to 30 days of surgery: Have a pre-op exam (sometimes called an H&P, or History and Physical). This can be done at a clinic or pre-operative center.  If you re having a , you may not need this exam. Talk to your care team.  At your pre-op exam, talk to your care team about all medicines you take. If you need to stop any medicines before surgery, ask when to start taking them again.  We do this for your safety. Many medicines can make you bleed too much during surgery. Some change how well surgery (anesthesia) drugs work.  Call your insurance company to let them know you re having surgery. (If you don t have insurance, call [phone].)  Call your clinic if there s any change in your health. This includes signs of a cold or flu (sore throat, runny nose, cough, rash, fever). It also includes a scrape or scratch near the surgery site.  If  you have questions on the day of surgery, call your hospital or surgery center.  COVID testing  You may need to be tested for COVID-19 before having surgery. If so, we will give you instructions (or click here).  Eating and drinking guidelines  For your safety: Unless your surgeon tells you otherwise, follow the guidelines below.  Eat and drink as usual until 8 hours before you arrive for surgery. After that, no food or milk.  Drink clear liquids until 2 hours before you arrive. These are liquids you can see through, like water, Gatorade, and Propel Water. They also include plain black coffee and tea (no cream or milk), candy, and breath mints. You can spit out gum when you arrive.  If you drink alcohol: Stop drinking it the night before surgery.  If your care team tells you to take medicine on the morning of surgery, it s okay to take it with a sip of water.  Preventing infection  Shower or bathe the night before and morning of your surgery. Follow the instructions your clinic gave you. (If no instructions, use regular soap.)  Don t shave or clip hair near your surgery site. We ll remove the hair if needed.  Don t smoke or vape the morning of surgery. You may chew nicotine gum up to 2 hours before surgery. A nicotine patch is okay.  Note: Some surgeries require you to completely quit smoking and nicotine. Check with your surgeon.  Your care team will make every effort to keep you safe from infection. We will:  Clean our hands often with soap and water (or an alcohol-based hand rub).  Clean the skin at your surgery site with a special soap that kills germs.  Give you a special gown to keep you warm. (Cold raises the risk of infection.)  Wear special hair covers, masks, gowns and gloves during surgery.  Give antibiotic medicine, if prescribed. Not all surgeries need antibiotics.  What to bring on the day of surgery  Photo ID and insurance card  Copy of your health care directive, if you have one  Glasses and hearing  aids (bring cases)  You can t wear contacts during surgery  Inhaler and eye drops, if you use them (tell us about these when you arrive)  CPAP machine or breathing device, if you use them  A few personal items, if spending the night  If you have . . .  A pacemaker, ICD (cardiac defibrillator) or other implant: Bring the ID card.  An implanted stimulator: Bring the remote control.  A legal guardian: Bring a copy of the certified (court-stamped) guardianship papers.  Please remove any jewelry, including body piercings. Leave jewelry and other valuables at home.  If you re going home the day of surgery  You must have a responsible adult drive you home. They should stay with you overnight as well.  If you don t have someone to stay with you, and you aren t safe to go home alone, we may keep you overnight. Insurance often won t pay for this.  After surgery  If it s hard to control your pain or you need more pain medicine, please call your surgeon s office.  Questions?   If you have any questions for your care team, list them here:   ____________________________________________________________________________________________________________________________________________________________________________________________________________________________________________________________________  For informational purposes only. Not to replace the advice of your health care provider. Copyright   2003, 2019 [facility]. All rights reserved. Clinically reviewed by Chela Chinchilla MD. [employer] [id number] - REV 10/22.

## 2022-11-23 NOTE — ANESTHESIA PREPROCEDURE EVALUATION
Anesthesia Pre-Procedure Evaluation    Patient: Vannessa Gupta   MRN: [id number] : 1975        Procedure : Procedure(s):  Colonoscopy, possible biopsy, possible polypectomy          Past Medical History:   Diagnosis Date     Benign essential hypertension 2015     Cardiomyopathy, unspecified (H)     right and left cath to be done; [place] -Dr Shafer; etiology? TROY, Vyvanse, other     GERD (gastroesophageal reflux diseae) 2011     Heart failure with reduced ejection fraction (H)     echo 2022; [facility]; EF 40-45%; mild hypokinesis LV; trace TR     Major depressive affective disorder, recurrent episode, unspecified 2011     Obesity  2011     Primary osteoarthritis of both hips     Dr De Leon; [facility]; planning total hip arthroplasty; goal weight first 250     Vitamin D deficiency 2015      Past Surgical History:   Procedure Laterality Date     ANGIOGRAM  2020    LHC - normal RCA/LCX; LAD luminal irregularities; Dr Hair; [place]; plan cMRI     bilateral tubal ligation        section  ,         oligohydramnios       Tonsillectomy       TOTAL HIP ARTHROPLASTY Right 2022    Dr De Leon     TOTAL HIP ARTHROPLASTY Left 2022    Dr De Leon      Allergies   Allergen Reactions     Iodine Other (See Comments) and Rash     (shrimp) lips swollen      Social History     Tobacco Use     Smoking status: Former     Types: Cigarettes     Quit date: 2004     Years since quittin.9     Smokeless tobacco: Never     Tobacco comments:     Tried to quit   Substance Use Topics     Alcohol use: Not Currently     Alcohol/week: 0.0 standard drinks     Comment: hasnt drank in a year      Wt Readings from Last 1 Encounters:   22 127 kg (280 lb)        Anesthesia Evaluation   Pt has had prior anesthetic. Type: General.    No history of anesthetic complications       ROS/MED HX  ENT/Pulmonary:     (+) TROY risk factors (sleep study pending, pt does  "not have CPAP), snores loudly, hypertension, obese, observed stopped breathing,     Neurologic:  - neg neurologic ROS     Cardiovascular:     (+) Dyslipidemia (rx metoprolol, did not take last night) hypertension-----CHF (pt states she has had followup test and does \"not have heart failure anymore\") Last EF: 40-45% date: 1/2022     METS/Exercise Tolerance: >4 METS    Hematologic:  - neg hematologic  ROS     Musculoskeletal:   (+) arthritis,     GI/Hepatic:     (+) bowel prep,     Renal/Genitourinary:  - neg Renal ROS     Endo:     (+) type II DM (insulin resistance syndrome on metformin), Obesity,     Psychiatric/Substance Use:     (+) psychiatric history depression and anxiety (takes zoloft, well treated per pt) H/O chronic opiod use .     Infectious Disease:  - neg infectious disease ROS     Malignancy:  - neg malignancy ROS     Other:  - neg other ROS          Physical Exam    Airway  airway exam normal      Mallampati: II   TM distance: > 3 FB   Neck ROM: full   Mouth opening: > 3 cm    Respiratory Devices and Support         Dental  no notable dental history         Cardiovascular   cardiovascular exam normal       Rhythm and rate: regular and normal     Pulmonary   pulmonary exam normal        breath sounds clear to auscultation           OUTSIDE LABS:  CBC:   Lab Results   Component Value Date    WBC 7.5 08/29/2022    WBC 7.4 01/15/2021    HGB 12.1 08/29/2022    HGB 13.1 01/15/2021    HCT 37.8 08/29/2022    HCT 40.3 01/15/2021     (H) 08/29/2022     01/15/2021     BMP:   Lab Results   Component Value Date     08/29/2022     06/23/2021    POTASSIUM 3.5 08/29/2022    POTASSIUM 3.9 06/23/2021    CHLORIDE 105 08/29/2022    CHLORIDE 106 06/23/2021    CO2 26 08/29/2022    CO2 29 06/23/2021    BUN 10 08/29/2022    BUN 11 06/23/2021    CR 0.58 08/29/2022    CR 0.59 06/23/2021    GLC 88 08/29/2022    GLC 88 06/23/2021     COAGS: No results found for: PTT, INR, FIBR  POC: No results found for: " BGM, HCG, HCGS  HEPATIC:   Lab Results   Component Value Date    ALBUMIN 3.4 08/29/2022    PROTTOTAL 8.0 08/29/2022    ALT 14 08/29/2022    AST 11 08/29/2022    ALKPHOS 77 08/29/2022    BILITOTAL 0.3 08/29/2022     OTHER:   Lab Results   Component Value Date    A1C 5.5 08/29/2022    PENNY 9.0 08/29/2022    TSH 0.99 03/25/2021    CRP 11.7 (H) 06/23/2021    SED 17 06/23/2021       Anesthesia Plan    ASA Status:  3   NPO Status:  NPO Appropriate    Anesthesia Type: MAC.     - Reason for MAC: chronic cardiopulmonary disease, straight local not clinically adequate              Consents    Anesthesia Plan(s) and associated risks, benefits, and realistic alternatives discussed. Questions answered and patient/representative(s) expressed understanding.     - Discussed: Risks, Benefits and Alternatives for BOTH SEDATION and the PROCEDURE were discussed     - Discussed with:  Patient      - Extended Intubation/Ventilatory Support Discussed: No.      - Patient is DNR/DNI Status: No    Use of blood products discussed: No .     Postoperative Care            Comments:    Other Comments: H&P 11/23 Erick, no interval changes    Risks and benefits of MAC anesthetic discussed including dental damage, aspiration, loss of airway, conversion to general anesthetic, CV complications, MI, stroke, death. Pt wishes to proceed.             HARPREET Garcia CRNA

## 2022-11-23 NOTE — LETTER
Opioid / Opioid Plus Controlled Substance Agreement    This is an agreement between you and your provider about the safe and appropriate use of controlled substance/opioids prescribed by your care team. Controlled substances are medicines that can cause physical and mental dependence (abuse).    There are strict laws about having and using these medicines. We here at [facility] are committing to working with you in your efforts to get better. To support you in this work, we ll help you schedule regular office appointments for medicine refills. If we must cancel or change your appointment for any reason, we ll make sure you have enough medicine to last until your next appointment.     As a Provider, I will:    Listen carefully to your concerns and treat you with respect.     Recommend a treatment plan that I believe is in your best interest. This plan may involve therapies other than opioid pain medication.     Talk with you often about the possible benefits, and the risk of harm of any medicine that we prescribe for you.     Provide a plan on how to taper (discontinue or go off) using this medicine if the decision is made to stop its use.    As a Patient, I understand that opioid(s):     Are a controlled substance prescribed by my care team to help me function or work and manage my condition(s).     Are strong medicines and can cause serious side effects such as:    Drowsiness, which can seriously affect my driving ability    A lower breathing rate, enough to cause death    Harm to my thinking ability     Depression     Abuse of and addiction to this medicine    Need to be taken exactly as prescribed. Combining opioids with certain medicines or chemicals (such as illegal drugs, sedatives, sleeping pills, and benzodiazepines) can be dangerous or even fatal. If I stop opioids suddenly, I may have severe withdrawal symptoms.    Do not work for all types of pain nor for all patients. If they re not helpful, I may  be asked to stop them.    {Benzo / Stimulant (Optional):019270}    The risks, benefits and side effects of these medicine(s) were explained to me. I agree that:  1. I will take part in other treatments as advised by my care team. This may be psychiatry or counseling, physical therapy, behavioral therapy, group treatment or a referral to a specialist.     2. I will keep all my appointments. I understand that this is part of the monitoring of opioids. My care team may require an office visit for EVERY opioid/controlled substance refill. If I miss appointments or don t follow instructions, my care team may stop my medicine.    3. I will take my medicines as prescribed. I will not change the dose or schedule unless my care team tells me to. There will be no refills if I run out early.     4. I may be asked to come to the clinic and complete a urine drug test or complete a pill count at any time. If I don t give a urine sample or participate in a pill count, the care team may stop my medicine.    5. I will only receive prescriptions from this clinic for chronic pain. If I am treated by another provider for acute pain issues, I will tell them that I am taking opioid pain medication for chronic pain and that I have a treatment agreement with this provider. I will inform my [facility] care team within one business day if I am given a prescription for any pain medication by another healthcare provider. My [facility] care team can contact other providers and pharmacists about my use of any medicines.    6. It is up to me to make sure that I don t run out of my medicines on weekends or holidays. If my care team is willing to refill my opioid prescription without a visit, I must request refills only during office hours. Refills may take up to 3 business days to process. I will use one pharmacy to fill all my opioid and other controlled substance prescriptions. I will notify the clinic about any changes to my  insurance or medication availability.    7. I am responsible for my prescriptions. If the medicine/prescription is lost, stolen or destroyed, it will not be replaced. I also agree not to share controlled substance medicines with anyone.    8. I am aware I should not use any illegal or recreational drugs. I agree not to drink alcohol unless my care team says I can.       9. If I enroll in the [place] [facility] program, I will tell my care team prior to my next refill.     10. I will tell my care team right away if I become pregnant, have a new medical problem treated outside of my regular clinic, or have a change in my medications.    11. I understand that this medicine can affect my thinking, judgment and reaction time. Alcohol and drugs affect the brain and body, which can affect the safety of my driving. Being under the influence of alcohol or drugs can affect my decision-making, behaviors, personal safety, and the safety of others. Driving while impaired (DWI) can occur if a person is driving, operating, or in physical control of a car, motorcycle, boat, snowmobile, ATV, motorbike, off-road vehicle, or any other motor vehicle ([place] Statute 169A.20). I understand the risk if I choose to drive or operate any vehicle or machinery.    I understand that if I do not follow any of the conditions above, my prescriptions or treatment may be stopped or changed.          Opioids  What You Need to Know    What are opioids?   Opioids are pain medicines that must be prescribed by a doctor. They are also known as narcotics.     Examples are:   1. morphine (MS Contin, Aileen)  2. oxycodone (Oxycontin)  3. oxycodone and acetaminophen (Percocet)  4. hydrocodone and acetaminophen (Vicodin, Norco)   5. fentanyl patch (Duragesic)   6. hydromorphone (Dilaudid)   7. methadone  8. codeine (Tylenol #3)     What do opioids do well?   Opioids are best for severe short-term pain such as after a surgery or injury. They may work well  for cancer pain. They may help some people with long-lasting (chronic) pain.     What do opioids NOT do well?   Opioids never get rid of pain entirely, and they don t work well for most patients with chronic pain. Opioids don t reduce swelling, one of the causes of pain.                                    Other ways to manage chronic pain and improve function include:       Treat the health problem that may be causing pain    Anti-inflammation medicines, which reduce swelling and tenderness, such as ibuprofen (Advil, Motrin) or naproxen (Aleve)    Acetaminophen (Tylenol)    Antidepressants and anti-seizure medicines, especially for nerve pain    Topical treatments such as patches or creams    Injections or nerve blocks    Chiropractic or osteopathic treatment    Acupuncture, massage, deep breathing, meditation, visual imagery, aromatherapy    Use heat or ice at the pain site    Physical therapy     Exercise    Stop smoking    Take part in therapy       Risks and side effects     Talk to your doctor before you start or decide to keep taking opioids. Possible side effects include:      Lowering your breathing rate enough to cause death    Overdose, including death, especially if taking higher than prescribed doses    Worse depression symptoms; less pleasure in things you usually enjoy    Feeling tired or sluggish    Slower thoughts or cloudy thinking    Being more sensitive to pain over time; pain is harder to control    Trouble sleeping or restless sleep    Changes in hormone levels (for example, less testosterone)    Changes in sex drive or ability to have sex    Constipation    Unsafe driving    Itching and sweating    Dizziness    Nausea, throwing up and dry mouth    What else should I know about opioids?    Opioids may lead to dependence, tolerance, or addiction.      Dependence means that if you stop or reduce the medicine too quickly, you will have withdrawal symptoms. These include loose poop (diarrhea),  jitters, flu-like symptoms, nervousness and tremors. Dependence is not the same as addiction.                       Tolerance means needing higher doses over time to get the same effect. This may increase the chance of serious side effects.      Addiction is when people improperly use a substance that harms their body, their mind or their relations with others. Use of opiates can cause a relapse of addiction if you have a history of drug or alcohol abuse.      People who have used opioids for a long time may have a lower quality of life, worse depression, higher levels of pain and more visits to doctors.    You can overdose on opioids. Take these steps to lower your risk of overdose:    1. Recognize the signs:  Signs of overdose include decrease or loss of consciousness (blackout), slowed breathing, trouble waking up and blue lips. If someone is worried about overdose, they should call 911.    2. Talk to your doctor about Narcan (naloxone).   If you are at risk for overdose, you may be given a prescription for Narcan. This medicine very quickly reverses the effects of opioids.   If you overdose, a friend or family member can give you Narcan while waiting for the ambulance. They need to know the signs of overdose and how to give Narcan.     3. Don't use alcohol or street drugs.   Taking them with opioids can cause death.    4. Do not take any of these medicines unless your doctor says it s OK. Taking these with opioids can cause death:    Benzodiazepines, such as lorazepam (Ativan), alprazolam (Xanax) or diazepam (Valium)    Muscle relaxers, such as cyclobenzaprine (Flexeril)    Sleeping pills like zolpidem (Ambien)     Other opioids      How to keep you and other people safe while taking opioids:    1. Never share your opioids with others.  Opioid medicines are regulated by the Drug Enforcement Agency (LOBO). Selling or sharing medications is a criminal act.    2. Be sure to store opioids in a secure place, locked up  if possible. Young children can easily swallow them and overdose.    3. When you are traveling with your medicines, keep them in the original bottles. If you use a pill box, be sure you also carry a copy of your medicine list from your clinic or pharmacy.    4. Safe disposal of opioids    Most pharmacies have places to get rid of medicine, called disposal kiosks. Medicine disposal options are also available in every [facility]. Search your county and  medication disposal  to find more options. You can find more details at:  https://www.pca.[facility].[place]./living-green/managing-unwanted-medications     I agree that my provider, clinic care team, and pharmacy may work with any city, state or federal law enforcement agency that investigates the misuse, sale, or other diversion of my controlled medicine. I will allow my provider to discuss my care with, or share a copy of, this agreement with any other treating provider, pharmacy or emergency room where I receive care.    I have read this agreement and have asked questions about anything I did not understand.    _______________________________________________________  Patient Signature - Vannessa Gupta _____________________                   Date     _______________________________________________________  Provider Signature - Aaliyah Sethi MD   _____________________                   Date     _______________________________________________________  Witness Signature (required if provider not present while patient signing)   _____________________                   Date

## 2022-11-23 NOTE — LETTER
My Depression Action Plan  Name: Vannessa Gupta   Date of Birth 1975  Date: 11/15/2022    My doctor: Aaliyah Suarez   My clinic: [facility] [place]  [address] [address]  [phone]          GREEN    ZONE   Good Control    What it looks like:     Things are going generally well. You have normal ups and downs. You may even feel depressed from time to time, but bad moods usually last less than a day.   What you need to do:  1. Continue to care for yourself (see self care plan)  2. Check your depression survival kit and update it as needed  3. Follow your physician s recommendations including any medication.  4. Do not stop taking medication unless you consult with your physician first.           YELLOW         ZONE Getting Worse    What it looks like:     Depression is starting to interfere with your life.     It may be hard to get out of bed; you may be starting to isolate yourself from others.    Symptoms of depression are starting to last most all day and this has happened for several days.     You may have suicidal thoughts but they are not constant.   What you need to do:     1. Call your care team. Your response to treatment will improve if you keep your care team informed of your progress. Yellow periods are signs an adjustment may need to be made.     2. Continue your self-care.  Just get dressed and ready for the day.  Don't give yourself time to talk yourself out of it.    3. Talk to someone in your support network.    4. Open up your Depression Self-Care Plan/Wellness Kit.           RED    ZONE Medical Alert - Get Help    What it looks like:     Depression is seriously interfering with your life.     You may experience these or other symptoms: You can t get out of bed most days, can t work or engage in other necessary activities, you have trouble taking care of basic hygiene, or basic responsibilities, thoughts of suicide or death that will not go away,  self-injurious behavior.     What you need to do:  1. Call your care team and request a same-day appointment. If they are not available (weekends or after hours) call your local crisis line, emergency room or 911.          Depression Self-Care Plan / Wellness Kit    Many people find that medication and therapy are helpful treatments for managing depression. In addition, making small changes to your everyday life can help to boost your mood and improve your wellbeing. Below are some tips for you to consider. Be sure to talk with your medical provider and/or behavioral health consultant if your symptoms are worsening or not improving.     Sleep   Sleep hygiene  means all of the habits that support good, restful sleep. It includes maintaining a consistent bedtime and wake time, using your bedroom only for sleeping or sex, and keeping the bedroom dark and free of distractions like a computer, smartphone, or television.     Develop a Healthy Routine  Maintain good hygiene. Get out of bed in the morning, make your bed, brush your teeth, take a shower, and get dressed. Don t spend too much time viewing media that makes you feel stressed. Find time to relax each day.    Exercise  Get some form of exercise every day. This will help reduce pain and release endorphins, the  feel good  chemicals in your brain. It can be as simple as just going for a walk or doing some gardening, anything that will get you moving.      Diet  Strive to eat healthy foods, including fruits and vegetables. Drink plenty of water. Avoid excessive sugar, caffeine, alcohol, and other mood-altering substances.     Stay Connected with Others  Stay in touch with friends and family members.    Manage Your Mood  Try deep breathing, massage therapy, biofeedback, or meditation. Take part in fun activities when you can. Try to find something to smile about each day.     Psychotherapy  Be open to working with a therapist if your provider recommends it.      Medication  Be sure to take your medication as prescribed. Most anti-depressants need to be taken every day. It usually takes several weeks for medications to work. Not all medicines work for all people. It is important to follow-up with your provider to make sure you have a treatment plan that is working for you. Do not stop your medication abruptly without first discussing it with your provider.    Crisis Resources   These hotlines are for both adults and children. They and are open 24 hours a day, 7 days a week unless noted otherwise.      National Suicide Prevention Lifeline   988 or 9-292-705-AOLB (1585)      Crisis Text Line    www.crisistextline.org  Text HOME to 486811 from anywhere in the [place], anytime, about any type of crisis. A live, trained crisis counselor will receive the text and respond quickly.      [facility] Lifeline for LGBTQ Youth  A national crisis intervention and suicide lifeline for LGBTQ youth under 25. Provides a safe place to talk without judgement. Call 1-956.223.7972; text START to 780523 or visit www.thetrevorproject.org to talk to a trained counselor.      For Community Health crisis numbers, visit the [facility] website at:  https://[place].gov/dhs/people-we-serve/adults/health-care/mental-health/resources/crisis-contacts.jsp

## 2022-11-24 LAB — INSULIN SERPL-ACNC: 12.8 UU/ML (ref 2.6–24.9)

## 2022-11-29 ENCOUNTER — DOCUMENTATION ONLY (OUTPATIENT)
Dept: SLEEP MEDICINE | Facility: HOSPITAL | Age: 47
End: 2022-11-29

## 2022-11-30 NOTE — PROGRESS NOTES
STOP BANG       Name: Vannessa Gupta MRN# [id number]   Age: 47 year old YOB: 1975     Stop Bang questionnaire completed with a score of >3 to allow for HST     Have you been told you snore loudly (louder than talking or loud enough to be heard through doors)? YES    Do you often feel tired, fatigued, or sleepy during the daytime? YES    Has anyone observed you stop breathing during your sleep? YES    Do you have or are you being treated for high blood pressure? NO    Is your BMI greater than 35? YES    Is your neck size circumference 16 inches or greater? YES    Are you over 50 years old? NO    Stop Bang Score (# of yes): 5

## 2022-11-30 NOTE — PROGRESS NOTES
SLEEP HISTORY QUESTIONNAIRE    Please describe the main reason for your sleep appointment? Snoring, overly tired, heart issues    How long has this been a problem? 5 years    Have you been diagnosed with a sleep problem in the past? YES    If so, what? Sleep apnea    What treatment was recommended? cpap    Have you had a sleep study in the past? YES    If yes, where and when? [facility]    Sleep Habits:   Do you read in bed? Yes  Do you eat in bed? Yes  Do you watch TV in bed? Yes  Do you work in bed? Yes  Do you use a phone or computer in bed? Yes    Is you sleep disturbed by:   Bed partner: No  Children: No  Noise: No   Pets: No  Other: pain in hips/knees      On two or more nights per week, do you drink alcohol to help you fall asleep?NO    On two or more nights per week, do you take melatonin to help you fall asleep? YES    On two or more nights per week, do you take over the counter medicine to fall asleep?  NO    Do you take drinks with caffeine (coffee, tea, soda, energy drinks)? YES    Do you have 3 or more caffeine drinks in a day? YES    Do you have caffeine drinks within 6 hours of bedtime? YES    Do you smoke or use tobacco? NO    Do you exercise? NO    Sleep Routine:   Using a 24 Hour Clock    What time do you usually get into bed on workdays? 10pm    Weekend/non work days? 11pm    What time do you get out of bed on workdays? 6am      Weekend/non work days?11am    Do you work the evening or night shift or do your shifts rotate? YES    How long does it usually take to fall to sleep? 30 min    How many times do you wake during the night? twice    How much time do you feel that you are awake during the entire night? 1 hour    How long does it take for you to fall back to sleep after you wake up? 10 min    Why do you think you wake up? Go to bathroom, pain    What do you do when you wake up? Go to bathroom, take a drink    How much sleep do you think you get on work nights? 5    How much sleep do you think you  get on weekends/non work days? 10    How much sleep do you think you need to feel your best? 8    How many days during a week do you take a nap on average? 4    What is the average length of your naps? 2 hours    Do you feel better after taking a nap? YES    If you could chose the best sleep schedule for you, what time would you go to bed? 10pm  What time would you get up? 10am    Do you read in bed? YES    Do you eat in bed? YES    Do you watch TV in bed? YES    Do you do work in bed? YES    Do you use a computer or phone in bed? YES    Sleep Disruptions?   Leg movements:  Do you ever have restless, crawling, aching or other unusual feelings in your legs? YES    Do you ever wake yourself by kicking your legs during the night? YES    Are the sheets and blankets messed up or tossed about when you get up? YES    Night-time behaviors:   Do you have nightmares or night terrors? NO   How often? n/a    Have you had times when you were sleep walking? NO    Have you been seen doing anything unusual while you sleep at nights? NO  What? n/a  How often? n/a    Have you ever hurt yourself or someone else while you were sleeping? NO  Please describe: n/a    Do you clench or grind your teeth during the night? yes    Sleep Apnea (pauses in breathing during sleep):  Do you wake with a headache in the morning? YES  How often? Once/week    Does your bed partner, family or friends ever say that you snore? YES  How many nights per week do you snore? Loud, frequent  Can snoring be heard outside the bedroom? yes    Do you ever wake yourself up from snoring, gasping or choking? YES    Have you ever been told that you stop breathing or have pauses in your breathing? YES    Do you wake in the morning with a dry throat or mouth? YES    Do you have trouble breathing through your nose? YES    Do you have problems with heartburn, reflux or a hiatal hernia? NO    Which positions do you usually sleep in? (stomach, back, sides, all) stomach,  sides    Do you use oxygen or any other medical equipment when you sleep? NO    Do members of your family (related by blood) snore? YES    Have any members of your family been diagnosed with with sleep apnea? YES    Do other members of your family have restless leg? NO    Do other members of your family have sleep walking? NO    Have you ever had an accident, or near accident due to sleepiness while driving? NO    Does your sleepiness affect your work on the job or at school? YES    Do you ever fall asleep by accident while doing a task? YES    Have you had sudden muscle weakness when laughing, angry or surprised? NO    Have you ever been unable to move your body when falling asleep or waking up? NO    Do you ever have trouble  your dreams from real life events? NO  Please describe: n/a    Physical Health: (including illness and injury): During the past 30 days, on how many days was your physical health not good? 0/30 days     Mental Health: (including stress, depression, and problems with emotions): During the last 30 days, how may days was your mental health not good? 30 days.     During the past 30 days, on how many days did poor physical or mental health keep you from doing your usual activities? This might be self-care, work, or play? 10/30 days.     Social History:   Marital status:     Who lives in your home with you? , son    Mother (alive or dead)? alive If has , from what? n/a  Father (alive or dead)? ded If has , from what? Heart disease, lung disease, rheumatoid arthritis, covid    Siblings: YES  Have any ? NO  If so, from what? n/a    Currently working? YES  If yes, work: [employer]  Former jobs: bartending, security, restaurant management, bar management,       Sleepiness Scale:   Sitting and reading 3   Watching TV 3   Sitting in a public place 0   Riding in a car 3   Lying down to rest in the afternoon 3   Sitting and talking to someone 0   Sitting  quietly after a lunch without alcohol 1   In a car, stopping for a few minutes in traffic 1       Surgical History:   Past Surgical History:   Procedure Laterality Date     ABDOMEN SURGERY  2005    I have had 2 c sections     ANGIOGRAM  2020    LHC - normal RCA/LCX; LAD luminal irregularities; Dr Hair; [facility]; plan cMRI     bilateral tubal ligation        section  ,         ENT SURGERY      Got my tonsils out     GYN SURGERY  2005         oligohydramnios       ORTHOPEDIC SURGERY  22 & 3-28-22    Total hip replacement left and right     Tonsillectomy       TOTAL HIP ARTHROPLASTY Right 2022    Dr De Leon     TOTAL HIP ARTHROPLASTY Left 2022    Dr De Leon     VASCULAR SURGERY  2022    Cath of both left and right side       Medical Conditions:   Past Medical History:   Diagnosis Date     Benign essential hypertension 2015     Cardiomyopathy, unspecified (H)     right and left cath to be done; [facility] -Dr Shafer; etiology? TROY, Vyvanse, other     Depressive disorder      GERD (gastroesophageal reflux diseae) 2011     Heart failure with reduced ejection fraction (H)     echo 2022; [facility]; EF 40-45%; mild hypokinesis LV; trace TR     Major depressive affective disorder, recurrent episode, unspecified 2011     Obesity  2011     Primary osteoarthritis of both hips     Dr De Leon; [facility]; planning total hip arthroplasty; goal weight first 250     Vitamin D deficiency 2015       Medications:   Current Outpatient Medications   Medication Sig     bisacodyl (DULCOLAX) 5 MG EC tablet Take 2 tabs at bedtime 2 nights prior to procedure. Take 2 tabs at 3pm the day before procedure. (Patient not taking: Reported on 2022)     cholecalciferol (VITAMIN D3) 5000 units (125 mcg) capsule Take 5,000 Units by mouth 2 times daily      ferrous fumarate 65 mg, [place]. FE,-Vitamin C 125 mg (VITRON C)  MG TABS tablet       losartan (COZAAR) 25 MG tablet Take 1 tablet (25 mg) by mouth daily     magnesium oxide (MAG-OX) 400 MG tablet Take 400 mg by mouth     medroxyPROGESTERone (PROVERA) 10 MG tablet Take 1 tablet (10 mg) by mouth daily     metFORMIN (GLUCOPHAGE XR) 500 MG 24 hr tablet Take 2 tablets (1,000 mg) by mouth 2 times daily (with meals)     metoprolol succinate ER (TOPROL-XL) 25 MG 24 hr tablet Take 25 mg by mouth     polyethylene glycol (GOLYTELY) 236 g suspension Drink 8 ounces every 10 minutes until the jug is half-empty at 6pm the night before procedure. Refrigerate. Repeat 6 hours prior to procedure. (Patient not taking: Reported on 11/23/2022)     potassium chloride ER (K-TAB/KLOR-CON) 10 MEQ CR tablet Take 10 mEq by mouth     pregabalin (LYRICA) 50 MG capsule 100 mg 3 times daily     sennosides (SENOKOT) 8.6 MG tablet      sertraline (ZOLOFT) 100 MG tablet TAKE 1 AND ONE-HALF TABLETS (150MG) BY MOUTH DAILY     No current facility-administered medications for this visit.       Are you currently having any of the following symptoms?   General:   Obvious weight gain or loss NO  Fever, chills or sweats NO  Drug allergies: no    Eyes:   Changes in vision NO  Blind spots NO  Double vision NO  Other no    Ear, Nose and Throat:   Ear pain NO  Sore throat NO  Sinus pain NO  Post-nasal drip NO  Runny nose NO  Bloody nose NO    Heart:   Rapid or irregular heart beat NO  Chest pain or pressure NO  Out of breath when lying down NO  Swelling in feet or legs NO  High blood pressure NO  Heart disease NO    Nervous system   Headaches YES  Weakness in arms or legs NO  Numbness in arms of legs NO  Other: no    Skin  Rashes NO  New moles or skin changes NO  Other no    Lungs  Shortness of breath at rest NO  Shortness of breath with activity YES  Dry cough NO  Coughing up mucous or phlegm NO  Coughing up blood NO  Wheezing when breathing NO    Lymph System  Swollen lymph nodes NO  New lumps or bumps NO  Changes in breasts or discharge  NO    Digestive System   Nausea or vomiting NO  Loose or watery stools NO  Hard, dry stools (constipation) NO  Fat or grease in stools NO  Blood in stools NO  Stools are black or bloody NO  Abdominal (belly) pain NO    Urinary Tract   Pain when you urinate (pee) NO  Blood in your urine NO  Urinate (pee) more than normal NO  Irregular periods NO    Muscles and bones   Muscle pain YES  Joint or bone pain YES  Swollen joints YES  Other no    Glands  Increased thirst or urination NO  Diabetes NO  Morning glucose: no  Afternoon glucose: no    Mental Health  Depression NO  Anxiety YES  Other mental health issues: no

## 2022-12-01 NOTE — PROGRESS NOTES
Chart review prior to sleep testing.    Patient Summary:  47 year old yo female who is referred for concern for sleep-disordered breathing.    Patient Active Problem List    Diagnosis Date Noted     Other specified anemias 04/20/2022     Priority: Medium     Cardiomyopathy, unspecified type (H) 04/11/2022     Priority: Medium     Formatting of this note might be different from the original.  Added automatically from request for surgery [id number]       Obstructive sleep apnea syndrome 04/11/2022     Priority: Medium     Formatting of this note might be different from the original.  Added automatically from request for surgery [id number]       Status post total replacement of right hip 02/14/2022     Priority: Medium     Arthritis of left hip 01/06/2022     Priority: Medium     Formatting of this note might be different from the original.  Added automatically from request for surgery [id number]       Opioid use, unspecified, uncomplicated 01/06/2022     Priority: Medium     Chronic pain of both knees 10/26/2021     Priority: Medium     Knee joint stiffness, bilateral 10/26/2021     Priority: Medium     Muscle weakness 10/26/2021     Priority: Medium     Premenopausal patient 09/30/2021     Priority: Medium     Osteoarthritis of both hips 09/17/2020     Priority: Medium     Menorrhagia with regular cycle--US normal--PROVERA daily---10/2019 10/23/2019     Priority: Medium     Morbid obesity (H) 10/10/2019     Priority: Medium     Insulin resistance syndrome 09/20/2018     Priority: Medium     Benign essential hypertension 11/30/2015     Priority: Medium     Vitamin D deficiency 11/30/2015     Priority: Medium     Major depressive disorder, recurrent episode (H) 07/14/2011     Priority: Medium     Problem list name updated by automated process. Provider to review       GERD (gastroesophageal reflux diseae) 07/14/2011     Priority: Medium       Current Outpatient Medications   Medication     bisacodyl (DULCOLAX) 5 MG EC tablet  "    cholecalciferol (VITAMIN D3) 5000 units (125 mcg) capsule     ferrous fumarate 65 mg, [place]. FE,-Vitamin C 125 mg (VITRON C)  MG TABS tablet     losartan (COZAAR) 25 MG tablet     magnesium oxide (MAG-OX) 400 MG tablet     medroxyPROGESTERone (PROVERA) 10 MG tablet     metFORMIN (GLUCOPHAGE XR) 500 MG 24 hr tablet     metoprolol succinate ER (TOPROL-XL) 25 MG 24 hr tablet     polyethylene glycol (GOLYTELY) 236 g suspension     potassium chloride ER (K-TAB/KLOR-CON) 10 MEQ CR tablet     pregabalin (LYRICA) 50 MG capsule     sennosides (SENOKOT) 8.6 MG tablet     sertraline (ZOLOFT) 100 MG tablet     No current facility-administered medications for this visit.       Pertinent PMHx of morbid obesity, GERD, insulin, resistance, HFrEF, cardiomyopathy, depression.    Echo performed at [facility] on 2022 with LVEF 40-45%, mild global LV hypokinesis, probable elevated right atrial pressure (15 mmHg).    Prior Sleep Testin2017 - PSG with weight ~302 lbs, BMI 49.5.  AHI 7.2.  Mean SpO2 94%, walter 84%.  Events appear predominantly REM-related.    STOP-BANG score of 5, with unknown neck circumference.  Galvin score of 14.  BMI of Estimated body mass index is 45.52 kg/m  as calculated from the following:    Height as of 22: 1.676 m (5' 6\").    Weight as of 22: 127.9 kg (282 lb).     Per questionnaire: \"Snoring, overly tired, heart issues\"    Caffeine use:  Yes for 3+ per day.  Yes for within 6 hours of bed.    Tobacco use: No    Sleep pattern:  Workdays.  10pm - 6am, total sleep time 5 hours.  Weekends.  11pm - 11am, total sleep time 8 hours.  Time to fall asleep: ~30 minutes.  Awakenings: 2 times per night, 10 minutes to return to sleep, awake for total of 1 hours per night.  Napping.  4 days per week, 2 hours per nap.    Sleep Habits:   Do you read in bed? Yes  Do you eat in bed? Yes  Do you watch TV in bed? Yes  Do you work in bed? Yes    Yes for RLS screen.  No for sleep walking.  No for " dream enactment behavior.  Yes for bruxism.    Yes for morning headaches.  Yes for snoring.  Yes for observed apnea.  Yes for FHx of TROY.    SHx:  , lives with  and son.  Works for [employer].    A/P:    1.)  History of mild TROY  - Interim changes of weight loss of ~20 lbs, evidence of HFrEF (LVEF 40-45%) with mild global LV hypokinesis and suggestion of elevated right atrial pressures.  - Given increased potential for central sleep apnea / cheyne clinton respirations and BMI > 45, recommend in-lab PSG with end-tidal capnography.    ---  This note was written with the assistance of the Dragon voice-dictation technology software. The final document, although reviewed, may contain errors. For corrections, please contact the office.    Orestes Choudhary MD    Sleep Medicine    [facility]  - [place], [place]  o Main Office: [phone]    [facility] - [place], [place]  o [address], [address]  o Schedule visits: [phone]  o Main Office: [phone]  o Fax: [phone]

## 2022-12-02 ENCOUNTER — ANESTHESIA (OUTPATIENT)
Dept: SURGERY | Facility: HOSPITAL | Age: 47
End: 2022-12-02
Payer: COMMERCIAL

## 2022-12-02 ENCOUNTER — HOSPITAL ENCOUNTER (OUTPATIENT)
Facility: HOSPITAL | Age: 47
Discharge: HOME | End: 2022-12-02
Attending: SURGERY | Admitting: SURGERY
Payer: COMMERCIAL

## 2022-12-02 VITALS
HEIGHT: 68 IN | WEIGHT: 284 LBS | HEART RATE: 65 BPM | RESPIRATION RATE: 16 BRPM | OXYGEN SATURATION: 100 % | TEMPERATURE: 97 F | SYSTOLIC BLOOD PRESSURE: 117 MMHG | DIASTOLIC BLOOD PRESSURE: 79 MMHG | BODY MASS INDEX: 43.04 KG/M2

## 2022-12-02 PROCEDURE — 360N000075 HC SURGERY LEVEL 2, PER MIN: Performed by: SURGERY

## 2022-12-02 PROCEDURE — 45385 COLONOSCOPY W/LESION REMOVAL: CPT | Mod: PT | Performed by: SURGERY

## 2022-12-02 PROCEDURE — 258N000003 HC RX IP 258 OP 636: Performed by: NURSE ANESTHETIST, CERTIFIED REGISTERED

## 2022-12-02 PROCEDURE — 710N000012 HC RECOVERY PHASE 2, PER MINUTE: Performed by: SURGERY

## 2022-12-02 PROCEDURE — 250N000011 HC RX IP 250 OP 636: Performed by: NURSE ANESTHETIST, CERTIFIED REGISTERED

## 2022-12-02 PROCEDURE — 88305 TISSUE EXAM BY PATHOLOGIST: CPT | Mod: 26 | Performed by: PATHOLOGY

## 2022-12-02 PROCEDURE — 999N000141 HC STATISTIC PRE-PROCEDURE NURSING ASSESSMENT: Performed by: SURGERY

## 2022-12-02 PROCEDURE — 370N000017 HC ANESTHESIA TECHNICAL FEE, PER MIN: Performed by: SURGERY

## 2022-12-02 PROCEDURE — 45385 COLONOSCOPY W/LESION REMOVAL: CPT | Performed by: NURSE ANESTHETIST, CERTIFIED REGISTERED

## 2022-12-02 PROCEDURE — 272N000001 HC OR GENERAL SUPPLY STERILE: Performed by: SURGERY

## 2022-12-02 PROCEDURE — 88305 TISSUE EXAM BY PATHOLOGIST: CPT | Mod: TC | Performed by: SURGERY

## 2022-12-02 PROCEDURE — 250N000009 HC RX 250: Performed by: NURSE ANESTHETIST, CERTIFIED REGISTERED

## 2022-12-02 RX ORDER — NALOXONE HYDROCHLORIDE 0.4 MG/ML
0.2 INJECTION, SOLUTION INTRAMUSCULAR; INTRAVENOUS; SUBCUTANEOUS
Status: CANCELLED | OUTPATIENT
Start: 2022-12-02

## 2022-12-02 RX ORDER — NALOXONE HYDROCHLORIDE 0.4 MG/ML
0.4 INJECTION, SOLUTION INTRAMUSCULAR; INTRAVENOUS; SUBCUTANEOUS
Status: CANCELLED | OUTPATIENT
Start: 2022-12-02

## 2022-12-02 RX ORDER — SODIUM CHLORIDE, SODIUM LACTATE, POTASSIUM CHLORIDE, CALCIUM CHLORIDE 600; 310; 30; 20 MG/100ML; MG/100ML; MG/100ML; MG/100ML
INJECTION, SOLUTION INTRAVENOUS CONTINUOUS
Status: DISCONTINUED | OUTPATIENT
Start: 2022-12-02 | End: 2022-12-02 | Stop reason: HOSPADM

## 2022-12-02 RX ORDER — FLUMAZENIL 0.1 MG/ML
0.2 INJECTION, SOLUTION INTRAVENOUS
Status: CANCELLED | OUTPATIENT
Start: 2022-12-02 | End: 2022-12-02

## 2022-12-02 RX ORDER — LIDOCAINE 40 MG/G
CREAM TOPICAL
Status: DISCONTINUED | OUTPATIENT
Start: 2022-12-02 | End: 2022-12-02 | Stop reason: HOSPADM

## 2022-12-02 RX ORDER — LIDOCAINE HYDROCHLORIDE 20 MG/ML
INJECTION, SOLUTION INFILTRATION; PERINEURAL PRN
Status: DISCONTINUED | OUTPATIENT
Start: 2022-12-02 | End: 2022-12-02

## 2022-12-02 RX ORDER — PROPOFOL 10 MG/ML
INJECTION, EMULSION INTRAVENOUS PRN
Status: DISCONTINUED | OUTPATIENT
Start: 2022-12-02 | End: 2022-12-02

## 2022-12-02 RX ADMIN — PROPOFOL 50 MG: 10 INJECTION, EMULSION INTRAVENOUS at 10:26

## 2022-12-02 RX ADMIN — PROPOFOL 50 MG: 10 INJECTION, EMULSION INTRAVENOUS at 10:34

## 2022-12-02 RX ADMIN — PROPOFOL 50 MG: 10 INJECTION, EMULSION INTRAVENOUS at 10:38

## 2022-12-02 RX ADMIN — PROPOFOL 50 MG: 10 INJECTION, EMULSION INTRAVENOUS at 10:30

## 2022-12-02 RX ADMIN — PROPOFOL 50 MG: 10 INJECTION, EMULSION INTRAVENOUS at 10:27

## 2022-12-02 RX ADMIN — PROPOFOL 50 MG: 10 INJECTION, EMULSION INTRAVENOUS at 10:41

## 2022-12-02 RX ADMIN — LIDOCAINE HYDROCHLORIDE 40 MG: 20 INJECTION, SOLUTION INFILTRATION; PERINEURAL at 10:19

## 2022-12-02 RX ADMIN — PROPOFOL 50 MG: 10 INJECTION, EMULSION INTRAVENOUS at 10:22

## 2022-12-02 RX ADMIN — SODIUM CHLORIDE, POTASSIUM CHLORIDE, SODIUM LACTATE AND CALCIUM CHLORIDE: 600; 310; 30; 20 INJECTION, SOLUTION INTRAVENOUS at 09:18

## 2022-12-02 RX ADMIN — PROPOFOL 50 MG: 10 INJECTION, EMULSION INTRAVENOUS at 10:19

## 2022-12-02 RX ADMIN — PROPOFOL 50 MG: 10 INJECTION, EMULSION INTRAVENOUS at 10:20

## 2022-12-02 RX ADMIN — PROPOFOL 50 MG: 10 INJECTION, EMULSION INTRAVENOUS at 10:25

## 2022-12-02 ASSESSMENT — ACTIVITIES OF DAILY LIVING (ADL)
ADLS_ACUITY_SCORE: 35
ADLS_ACUITY_SCORE: 33

## 2022-12-02 NOTE — ANESTHESIA POSTPROCEDURE EVALUATION
Patient: Vannessa Gupta    Procedure: Procedure(s):  Colonoscopy with polypectomy       Anesthesia Type:  MAC    Note:  Disposition: Outpatient   Postop Pain Control: Uneventful            Sign Out: Well controlled pain   PONV: No   Neuro/Psych: Uneventful            Sign Out: Acceptable/Baseline neuro status   Airway/Respiratory: Uneventful            Sign Out: Acceptable/Baseline resp. status   CV/Hemodynamics: Uneventful            Sign Out: Acceptable CV status; No obvious hypovolemia; No obvious fluid overload   Other NRE: NONE   DID A NON-ROUTINE EVENT OCCUR? No           Last vitals:  Vitals Value Taken Time   /82 12/02/22 1115   Temp     Pulse 65 12/02/22 1115   Resp 16 12/02/22 1115   SpO2 98 % 12/02/22 1117   Vitals shown include unvalidated device data.    Electronically Signed By: HARPREET Song CRNA  December 2, 2022  11:18 AM

## 2022-12-02 NOTE — OP NOTE
Vannessa Gupta MRN# [id number]   YOB: 1975 Age: 47 year old      Date of Admission:  12/2/2022  Date of Service:   12/02/22    Primary care provider: Aaliyah Suarez    PREOPERATIVE DIAGNOSIS:  Colon Cancer Screening        POSTOPERATIVE DIAGNOSIS:  Pan diverticulosis, Colon polyps x 2           PROCEDURE:  Colonoscopy with polypectomy          INDICATIONS:  Screening colonoscopy.      Specimen:   ID Type Source Tests Collected by Time Destination   1 : @ 65cm Polyp Large Intestine, Colon SURGICAL PATHOLOGY EXAM Jvoanni Brown MD 12/2/2022 10:36 AM    2 :  Polyp Rectum SURGICAL PATHOLOGY EXAM Jovanni Brown MD 12/2/2022 10:42 AM        SURGEON: Jovanni Brown MD    DESCRIPTION OF PROCEDURE: Vannessa Gupta was brought into the endoscopy suite and placed in the left lateral decubitus position. After preprocedural pause and attended monitored anesthesia was administered, the external anus was inspected and was normal. Digital rectal exam was normal. The colonoscope was inserted and advanced under direct visualization to the level of the cecum which was identified by the appendiceal orifice and the ileocecal valve. The prep was excellent.. Upon slow withdrawal of the colonoscope, approximately 95% of the mucosa was directly visualized. There was extensive diverticulosis throughout the colon. There were two sessile polyps removed with a cold snare. The one in the rectum was right at the start of a hemorrhoid near the beginning of the anal canal.   The rest of the colon was without mucosal abnormality. There was no evidence of further polyps, inflammation, bleeding or AVMs. Retroflexion of the rectum was normal. The extra air was removed from the colon, and the colonoscope withdrawn. The patient tolerated the procedure well and was taken to postanesthesia care unit.     We invite the patient to return in 5-10 years for follow up screening evaluation, pending pathology related to  polyps.    Jovanni Brown MD

## 2022-12-02 NOTE — ANESTHESIA CARE TRANSFER NOTE
Patient: Vannessa Gupta    Procedure: Procedure(s):  Colonoscopy with polypectomy       Diagnosis: Colon cancer screening [Z12.11]  Diagnosis Additional Information: No value filed.    Anesthesia Type:   MAC     Note:    Oropharynx: spontaneously breathing  Level of Consciousness: drowsy  Oxygen Supplementation: room air    Independent Airway: airway patency satisfactory and stable  Dentition: dentition unchanged  Vital Signs Stable: post-procedure vital signs reviewed and stable  Report to RN Given: handoff report given  Patient transferred to: Phase II    Handoff Report: Identifed the Patient, Identified the Reponsible Provider, Reviewed the pertinent medical history, Discussed the surgical course, Reviewed Intra-OP anesthesia mangement and issues during anesthesia, Set expectations for post-procedure period and Allowed opportunity for questions and acknowledgement of understanding      Vitals:  Vitals Value Taken Time   BP     Temp     Pulse     Resp     SpO2         Electronically Signed By: HARPREET Song CRNA  December 2, 2022  10:45 AM

## 2022-12-02 NOTE — OR NURSING
Discharge instructions given to patient and patient's spouse. No questions. Ambulated out of unit. Denies pain, nausea or dizziness  Nikky 18  Iv dced after eating and drinking without nausea and vomiting,

## 2022-12-02 NOTE — DISCHARGE INSTRUCTIONS
You had two colon polyps removed today.  Dr. Brown's office will call you with the pathology results when they become available.      INSTRUCTIONS AFTER COLONOSCOPY    WHEN YOU ARE BACK HOME:  Plan to rest for an hour or two after you get home.  You may have some cramping or pressure until you pass gas.  You may resume your regular medications.  Eat a small, light meal at first, and then gradually return to normal meal sizes.  You will be contacted the next day to see how you are doing.  If you had a polyp removed:  Slight bleeding may occur.  You may have a slight blood stain on the toilet paper after a bowel movement.  To lessen the chance of bleeding, avoid heavy exercise for ONE WEEK.  This includes heavy lifting, vigorous sport activities, and heavy physical labor.  You may resume your normal sexual activity.    Avoid aspirin or aspirin products if instructed by your doctor.  If there is a polyp or biopsy, you will be contacted with results within one week.     WHAT TO WATCH FOR:  Problems rarely occur after the exam; however, it is important for you to watch for early signs of possible problems.  If you have   Unusual pain in your abdomen  Nausea and vomiting that persists  Excessive bleeding  Black or bloody bowel movements  Fever or temperature above 100.6 F  Please call your doctor ([facility] [phone]) or go to the nearest hospital emergency room.    Post-Anesthesia Patient Instructions    IMMEDIATELY FOLLOWING SURGERY:  Do not drive or operate machinery for the first twenty four hours after surgery.  Do not make any important decisions for twenty four hours after surgery or while taking narcotic pain medications or sedatives.  If you develop intractable nausea and vomiting or a severe headache please notify your doctor immediately.    FOLLOW-UP:  Please make an appointment with your surgeon as instructed. You do not need to follow up with anesthesia unless specifically instructed to do so.    WOUND  CARE INSTRUCTIONS (if applicable):  Keep a dry clean dressing on the anesthesia/puncture wound site if there is drainage.  Once the wound has quit draining you may leave it open to air.  Generally you should leave the bandage intact for twenty four hours unless there is drainage.  If the epidural site drains for more than 36-48 hours please call the anesthesia department.    QUESTIONS?:  Please feel free to call your physician or the hospital  if you have any questions, and they will be happy to assist you.

## 2022-12-02 NOTE — LETTER
December 9, 2022      Vannessanick Gupta  [address]  [place] [address]        Dear ,    We are writing to inform you of your test results.    Your test results fall within the expected range(s) or remain unchanged from previous results.  Please continue with current treatment plan. You will need another colonoscopy in 10 years.     Resulted Orders   Surgical Pathology Exam   Result Value Ref Range    Case Report       Surgical Pathology Report                         Case: [id number]                                  Authorizing Provider:  Jovanni Brown MD        Collected:           12/02/2022 10:36 AM          Ordering Location:     [facility]     Received:            12/02/2022 10:53 AM          Pathologist:           Kwaku Waddell DO                                                         Specimens:   A) - Large Intestine, Colon, @ 65cm                                                                 B) - Rectum                                                                                Final Diagnosis       A.  Colon, polyp at 65 cm, polypectomy:  -Colonic mucosa with some features suggestive of a hyperplastic polyp.    B.  Rectum, polyp, polypectomy:  -Inflammatory polyp with marked acute and chronic inflammation and granulation tissue formation.      Clinical Information       Procedure:  Colonoscopy with polypectomy  Pre-op Diagnosis: Colon cancer screening [Z12.11]  Post-op Diagnosis: Z12.11 - Colon cancer screening [ICD-10-CM]      Gross Description       A(1). Large Intestine, Colon, @ 65cm:  Specimen is received in formalin labeled with patient's name, medical record number and designated colon polyp at 65 cm.  It consists of a single piece of tan soft tissue measuring 4 x 2 mm.  All in 1 cassette  B(2). Rectum, :  Specimen is received in formalin labeled with patient's name, medical record number and designated rectal polyp.  It consists of a single piece of tan soft  tissue measuring 6 x 4 mm.  All in 1 cassette      Microscopic Description       A microscopic examination was performed.      Case Images         If you have any questions or concerns, please call the clinic at the number listed above.       Sincerely,      Jovanni Brown MD

## 2022-12-02 NOTE — INTERVAL H&P NOTE
"I have reviewed the surgical (or preoperative) H&P that is linked to this encounter, and examined the patient. There are no significant changes    Clinical Conditions Present on Arrival:  Clinically Significant Risk Factors Present on Admission                    # Severe Obesity: Estimated body mass index is 43.18 kg/m  as calculated from the following:    Height as of this encounter: 1.727 m (5' 8\").    Weight as of this encounter: 128.8 kg (284 lb).       "

## 2022-12-06 ENCOUNTER — APPOINTMENT (OUTPATIENT)
Dept: GENERAL RADIOLOGY | Facility: HOSPITAL | Age: 47
End: 2022-12-06
Attending: NURSE PRACTITIONER
Payer: COMMERCIAL

## 2022-12-06 ENCOUNTER — HOSPITAL ENCOUNTER (EMERGENCY)
Facility: HOSPITAL | Age: 47
Discharge: HOME | End: 2022-12-06
Attending: NURSE PRACTITIONER | Admitting: NURSE PRACTITIONER
Payer: COMMERCIAL

## 2022-12-06 VITALS
OXYGEN SATURATION: 99 % | SYSTOLIC BLOOD PRESSURE: 154 MMHG | RESPIRATION RATE: 16 BRPM | HEART RATE: 65 BPM | TEMPERATURE: 98.3 F | DIASTOLIC BLOOD PRESSURE: 94 MMHG

## 2022-12-06 DIAGNOSIS — M25.552 HIP PAIN, LEFT: Primary | ICD-10-CM

## 2022-12-06 PROCEDURE — 99213 OFFICE O/P EST LOW 20 MIN: CPT | Performed by: NURSE PRACTITIONER

## 2022-12-06 PROCEDURE — G0463 HOSPITAL OUTPT CLINIC VISIT: HCPCS

## 2022-12-06 PROCEDURE — 73502 X-RAY EXAM HIP UNI 2-3 VIEWS: CPT

## 2022-12-06 RX ORDER — SULFAMETHOXAZOLE/TRIMETHOPRIM 800-160 MG
1 TABLET ORAL 2 TIMES DAILY
Qty: 14 TABLET | Refills: 0 | Status: SHIPPED | OUTPATIENT
Start: 2022-12-06 | End: 2022-12-13

## 2022-12-06 RX ORDER — PREDNISONE 20 MG/1
TABLET ORAL
Qty: 10 TABLET | Refills: 0 | Status: SHIPPED | OUTPATIENT
Start: 2022-12-06 | End: 2023-04-02

## 2022-12-06 ASSESSMENT — ENCOUNTER SYMPTOMS
NAUSEA: 0
COLOR CHANGE: 0
DIARRHEA: 0
WOUND: 0
FEVER: 0
SHORTNESS OF BREATH: 0
CHILLS: 0
PSYCHIATRIC NEGATIVE: 1
VOMITING: 0

## 2022-12-07 LAB
PATH REPORT.COMMENTS IMP SPEC: NORMAL
PATH REPORT.FINAL DX SPEC: NORMAL
PATH REPORT.GROSS SPEC: NORMAL
PATH REPORT.MICROSCOPIC SPEC OTHER STN: NORMAL
PATH REPORT.RELEVANT HX SPEC: NORMAL
PHOTO IMAGE: NORMAL

## 2022-12-07 NOTE — DISCHARGE INSTRUCTIONS
Prednisone as ordered for hip pain    Bactrim as ordered   - Take entire course of antibiotic even if you start to feel better.  - Antibiotics can cause stomach upset including nausea and diarrhea. Read your bottle or ask the pharmacist if antibiotic can be taken with food to help prevent nausea. If you have symptoms of diarrhea you can take an over-the-counter probiotic and/or increase foods with probiotics such as yogurt, [place], sauerkraut.    Follow up with Dr. De Leon for further evaluation    Return to urgent care/ED with any worsening in condition or additional concerns.

## 2022-12-07 NOTE — ED TRIAGE NOTES
"Patient presents with c/o \"lump\" on left hip that is painful, warm, red. Patient reports that lump originally appeared 2.5 months ago, since area is larger has become painful red and warm. Patient reports left hip replacement 3/28/22.       "

## 2022-12-07 NOTE — ED PROVIDER NOTES
History     Chief Complaint   Patient presents with     Wound Check     HPI  Vannessa Gupta is a 47 year old female who presents to urgent care today with complaints of a lump to left hip.  Lump is painful and warm to the touch.  Patient had a left total hip arthroplasty completed on 9/30/2022 by Dr. De Leon at [facility] in [place].  Lump appeared 2.5 months ago.  Patient was told by Dr. De Leon to massage the area.  Patient believes area is growing in size.  Denies any fever, chills, nausea, vomiting, diarrhea, shortness of breath or chest pain.  Full ROM of left lower extremity.  No open skin wounds.  No other concerns.    Allergies:  Allergies   Allergen Reactions     Iodine Other (See Comments) and Rash     (shrimp) lips swollen       Problem List:    Patient Active Problem List    Diagnosis Date Noted     Other specified anemias 04/20/2022     Priority: Medium     Cardiomyopathy, unspecified type (H) 04/11/2022     Priority: Medium     Formatting of this note might be different from the original.  Added automatically from request for surgery [id number]       Obstructive sleep apnea syndrome 04/11/2022     Priority: Medium     Formatting of this note might be different from the original.  Added automatically from request for surgery [id number]       Status post total replacement of right hip 02/14/2022     Priority: Medium     Arthritis of left hip 01/06/2022     Priority: Medium     Formatting of this note might be different from the original.  Added automatically from request for surgery [id number]       Opioid use, unspecified, uncomplicated 01/06/2022     Priority: Medium     Chronic pain of both knees 10/26/2021     Priority: Medium     Knee joint stiffness, bilateral 10/26/2021     Priority: Medium     Muscle weakness 10/26/2021     Priority: Medium     Premenopausal patient 09/30/2021     Priority: Medium     Osteoarthritis of both hips 09/17/2020     Priority: Medium     Menorrhagia with regular  cycle--US normal--PROVERA daily---10/2019 10/23/2019     Priority: Medium     Morbid obesity (H) 10/10/2019     Priority: Medium     Insulin resistance syndrome 2018     Priority: Medium     Benign essential hypertension 2015     Priority: Medium     Vitamin D deficiency 2015     Priority: Medium     Major depressive disorder, recurrent episode (H) 2011     Priority: Medium     Problem list name updated by automated process. Provider to review       GERD (gastroesophageal reflux diseae) 2011     Priority: Medium        Past Medical History:    Past Medical History:   Diagnosis Date     Benign essential hypertension 2015     Cardiomyopathy, unspecified (H)      Depressive disorder      GERD (gastroesophageal reflux diseae) 2011     Heart failure with reduced ejection fraction (H)      Major depressive affective disorder, recurrent episode, unspecified 2011     Obesity  2011     Primary osteoarthritis of both hips      Vitamin D deficiency 2015       Past Surgical History:    Past Surgical History:   Procedure Laterality Date     ABDOMEN SURGERY  2005    I have had 2 c sections     ANGIOGRAM  2020    LHC - normal RCA/LCX; LAD luminal irregularities; Dr Hair; [facility]; plan cMRI     bilateral tubal ligation        section  ,         COLONOSCOPY N/A 2022    Procedure: Colonoscopy with polypectomy;  Surgeon: Jovanni Brown MD;  Location: [facility]     ENT SURGERY      Got my tonsils out     GYN SURGERY  2005         oligohydramnios       ORTHOPEDIC SURGERY  22 & 3-28-22    Total hip replacement left and right     Tonsillectomy       TOTAL HIP ARTHROPLASTY Right 2022    Dr De Leon     TOTAL HIP ARTHROPLASTY Left 2022    Dr De Leon     VASCULAR SURGERY  2022    Cath of both left and right side       Family History:    Family History   Problem Relation Age of Onset     Cancer  Mother         Cervical     Depression Mother      Anxiety Disorder Mother      Osteoporosis Mother      Depression Father      Diabetes Father      Hypertension Father      Other - See Comments Father         Cholecystectomy/Rheumatoid arthritis     Heart Disease Father      LUNG DISEASE Father      Liver Disease Father      Coronary Artery Disease Father      Cerebrovascular Disease Father      Anxiety Disorder Father      Osteoporosis Father      Other - See Comments Sister         Endometriosis     Anxiety Disorder Sister      Fibromyalgia Other         mom and sister     Asthma No family hx of      Thyroid Disease No family hx of        Social History:  Marital Status:   [2]  Social History     Tobacco Use     Smoking status: Former     Packs/day: 0.00     Years: 1.00     Pack years: 0.00     Types: Cigarettes     Quit date: 2004     Years since quittin.0     Smokeless tobacco: Never     Tobacco comments:     Tried to quit   Vaping Use     Vaping Use: Never used   Substance Use Topics     Alcohol use: Yes     Comment: hasnt drank in a year     Drug use: No        Medications:    predniSONE (DELTASONE) 20 MG tablet  sulfamethoxazole-trimethoprim (BACTRIM DS) 800-160 MG tablet  bisacodyl (DULCOLAX) 5 MG EC tablet  cholecalciferol (VITAMIN D3) 5000 units (125 mcg) capsule  ferrous fumarate 65 mg, [place]. FE,-Vitamin C 125 mg (VITRON C)  MG TABS tablet  losartan (COZAAR) 25 MG tablet  magnesium oxide (MAG-OX) 400 MG tablet  medroxyPROGESTERone (PROVERA) 10 MG tablet  metFORMIN (GLUCOPHAGE XR) 500 MG 24 hr tablet  metoprolol succinate ER (TOPROL-XL) 25 MG 24 hr tablet  polyethylene glycol (GOLYTELY) 236 g suspension  potassium chloride ER (K-TAB/KLOR-CON) 10 MEQ CR tablet  pregabalin (LYRICA) 50 MG capsule  sennosides (SENOKOT) 8.6 MG tablet  sertraline (ZOLOFT) 100 MG tablet      Review of Systems   Constitutional: Negative for chills and fever.   Respiratory: Negative for shortness of breath.     Cardiovascular: Negative for chest pain.   Gastrointestinal: Negative for diarrhea, nausea and vomiting.   Musculoskeletal: Negative for gait problem.   Skin: Negative.  Negative for color change, pallor, rash and wound.        Lump to left hip   Psychiatric/Behavioral: Negative.      Physical Exam   BP: 154/94  Pulse: 65  Temp: 98.3  F (36.8  C)  Resp: 16  SpO2: 99 %    Physical Exam  Vitals and nursing note reviewed.   Constitutional:       General: She is not in acute distress.     Appearance: Normal appearance. She is not ill-appearing or toxic-appearing.   Cardiovascular:      Rate and Rhythm: Normal rate and regular rhythm.      Pulses: Normal pulses.      Heart sounds: Normal heart sounds.   Pulmonary:      Effort: Pulmonary effort is normal.      Breath sounds: Normal breath sounds.   Musculoskeletal:      Left hip: Tenderness present. No deformity, lacerations, bony tenderness or crepitus. Normal range of motion. Normal strength.      Left knee: Normal.      Left ankle: Normal.      Comments: Pain, tenderness and swelling to hip, consistent with possible bursitis.    Skin:     General: Skin is warm and dry.      Capillary Refill: Capillary refill takes less than 2 seconds.   Neurological:      Mental Status: She is alert.   Psychiatric:         Mood and Affect: Mood normal.       ED Course     Procedures    Results for orders placed or performed during the hospital encounter of 12/06/22 (from the past 24 hour(s))   XR Pelvis including Hip Left 2-3 Views    Narrative    XR PELVIS AND HIP LEFT 2 VIEWS    HISTORY: left hip pain/swelling .    COMPARISON: 6/23/2021.    TECHNIQUE: AP pelvis and 2 views left hip.    FINDINGS:    Prior bilateral total hip arthroplasties. No periprosthetic loosening  or fracture is seen. Degenerative changes are seen in the SI joints  and the lumbar spine.        Impression    IMPRESSION:     Prior hip arthroplasties.      NATASHA JORDAN MD         SYSTEM ID:  [id number]        Medications - No data to display    Assessments & Plan (with Medical Decision Making)     I have reviewed the nursing notes.    I have reviewed the findings, diagnosis, plan and need for follow up with the patient.  (M25.252) Hip pain, left  (primary encounter diagnosis)  Plan:   Patient ambulatory with a nontoxic appearance.  Denies any fever, chills, nausea, vomiting, diarrhea, shortness of breath or chest pain.  Patient had a left hip arthroplasty completed on 2/14/2022 with Dr. De Leon at [facility].  Patient has a telephone message out to surgeon regarding current issue.  Patient states that she has had this problem ongoing for the last 2 and half months and was told to massage the area.  Distal pulses 2+ and equal.  Full ROM.  Able to stand from a seated position in order to ambulate.  X-ray of left hip completed and impression shows prior hip arthroplasties.  Pain, tenderness and swelling consistent with possible bursitis.  Do not see signs of infection, areas not red and warm to the touch and no fever or chills.  Reviewed risk versus benefits of antibiotic, patient would like to try antibiotic, will prescribe Bactrim.  Prednisone as ordered for pain and inflammation.  Patient to follow-up with Dr. De Leon for further evaluation.  Patient to return to urgent care/ED with any worsening in condition or additional concerns.  Patient is in agreement with treatment plan.    Discharge Medication List as of 12/6/2022  8:48 PM      START taking these medications    Details   predniSONE (DELTASONE) 20 MG tablet Take two tablets (= 40mg) each day for 5 (five) days, Disp-10 tablet, R-0, E-Prescribe      sulfamethoxazole-trimethoprim (BACTRIM DS) 800-160 MG tablet Take 1 tablet by mouth 2 times daily for 7 days, Disp-14 tablet, R-0, E-Prescribe           Final diagnoses:   Hip pain, left     12/6/2022   [facility]     Angela Laguerre NP  12/06/22 [id number]

## 2022-12-07 NOTE — ED TRIAGE NOTES
"Pt presents with c/o \"lump\" on the left hip that is hot red and burns, painful to touch. Reports hip surgery in March 28th 2022. Original lump appeared 2.5 months ago. Saw surgeon and was told to massage it also prescribed some medication for nerve pain. Reports that area has since got bigger and more red. Pt has been taking ibuprofen.      "

## 2022-12-14 ENCOUNTER — PATIENT OUTREACH (OUTPATIENT)
Dept: OTHER | Facility: HOSPITAL | Age: 47
End: 2022-12-14

## 2022-12-14 NOTE — TELEPHONE ENCOUNTER
Patient Quality Outreach    Patient is due for the following:   Depression  -  PHQ-9 needed    Next Steps:   Patient was assigned appropriate questionnaire to complete    Type of outreach:    Sent [employer] message.      Questions for provider review:    None     Flaquita Lopez RN

## 2022-12-29 ENCOUNTER — VIRTUAL VISIT (OUTPATIENT)
Dept: PULMONOLOGY | Facility: OTHER | Age: 47
End: 2022-12-29
Attending: FAMILY MEDICINE
Payer: COMMERCIAL

## 2022-12-29 VITALS — WEIGHT: 290 LBS | HEIGHT: 68 IN | BODY MASS INDEX: 43.95 KG/M2

## 2022-12-29 DIAGNOSIS — R06.83 SNORING: ICD-10-CM

## 2022-12-29 DIAGNOSIS — E66.01 MORBID OBESITY (H): ICD-10-CM

## 2022-12-29 PROCEDURE — 99203 OFFICE O/P NEW LOW 30 MIN: CPT | Mod: 95 | Performed by: FAMILY MEDICINE

## 2022-12-29 ASSESSMENT — SLEEP AND FATIGUE QUESTIONNAIRES
HOW LIKELY ARE YOU TO NOD OFF OR FALL ASLEEP IN A CAR, WHILE STOPPED FOR A FEW MINUTES IN TRAFFIC: WOULD NEVER DOZE
HOW LIKELY ARE YOU TO NOD OFF OR FALL ASLEEP WHILE SITTING QUIETLY AFTER LUNCH WITHOUT ALCOHOL: SLIGHT CHANCE OF DOZING
HOW LIKELY ARE YOU TO NOD OFF OR FALL ASLEEP WHEN YOU ARE A PASSENGER IN A CAR FOR AN HOUR WITHOUT A BREAK: HIGH CHANCE OF DOZING
HOW LIKELY ARE YOU TO NOD OFF OR FALL ASLEEP WHILE WATCHING TV: MODERATE CHANCE OF DOZING
HOW LIKELY ARE YOU TO NOD OFF OR FALL ASLEEP WHILE SITTING AND TALKING TO SOMEONE: WOULD NEVER DOZE
HOW LIKELY ARE YOU TO NOD OFF OR FALL ASLEEP WHILE LYING DOWN TO REST IN THE AFTERNOON WHEN CIRCUMSTANCES PERMIT: MODERATE CHANCE OF DOZING
HOW LIKELY ARE YOU TO NOD OFF OR FALL ASLEEP WHILE SITTING AND READING: SLIGHT CHANCE OF DOZING
HOW LIKELY ARE YOU TO NOD OFF OR FALL ASLEEP WHILE SITTING INACTIVE IN A PUBLIC PLACE: WOULD NEVER DOZE

## 2022-12-29 ASSESSMENT — PAIN SCALES - GENERAL: PAINLEVEL: NO PAIN (0)

## 2022-12-29 NOTE — PROGRESS NOTES
"Vannessa is a 47 year old who is being evaluated via a billable video visit.      How would you like to obtain your AVS? MyChart  If the video visit is dropped, the invitation should be resent by: Send to e-mail at: [email or web address]  Will anyone else be joining your video visit? No  {If patient encounters technical issues they should call [phone] :[id number]}  Kwaku Freeman      Video-Visit Details    Type of service:  Video Visit     Originating Location (pt. Location): {video visit patient location:750259::\"Home\"}  {PROVIDER LOCATION On-site should be selected for visits conducted from your clinic location or adjoining [facility] hospital, academic office, or other nearby [facility] building. Off-site should be selected for all other provider locations, including home:[id number]}  Distant Location (provider location):  {virtual location provider:959887}  Platform used for Video Visit: {Virtual Visit Platforms:637137::\"[employer]\"}  "

## 2022-12-29 NOTE — PATIENT INSTRUCTIONS
Hello,    Here is a list of dentists we have worked with in the [facility] for the oral appliance (mandibular advancement device) for treatment of obstructive sleep apnea.  I am still working to establish a contact in the [facility].      You can search for other sleep dental providers via the American Academy of Dental Sleep Medicine website:  https://www.aadsm.org/  https://mms.aadsm.org/members/directory/search_bootstrap.php?org_id=ADSM    [place]:    [facility]  [email or web address]  PH: [phone]  FAX: [phone]  infosuad Choudhary MD    Sleep Medicine  [place], [place]  Main Office: [phone]  [facility] [address], [address]  Schedule visits: [phone]  Main Office: [phone]  Fax: [phone]

## 2023-01-15 ENCOUNTER — HEALTH MAINTENANCE LETTER (OUTPATIENT)
Age: 48
End: 2023-01-15

## 2023-02-09 DIAGNOSIS — I10 BENIGN ESSENTIAL HYPERTENSION: ICD-10-CM

## 2023-02-09 DIAGNOSIS — N92.0 MENORRHAGIA WITH REGULAR CYCLE: ICD-10-CM

## 2023-02-10 ENCOUNTER — MYC MEDICAL ADVICE (OUTPATIENT)
Dept: FAMILY MEDICINE | Facility: OTHER | Age: 48
End: 2023-02-10

## 2023-02-10 DIAGNOSIS — E83.42 HYPOMAGNESEMIA: Primary | ICD-10-CM

## 2023-02-13 RX ORDER — MAGNESIUM OXIDE 400 MG/1
400 TABLET ORAL DAILY
Qty: 90 TABLET | Refills: 1 | Status: SHIPPED | OUTPATIENT
Start: 2023-02-13 | End: 2023-08-09

## 2023-02-13 RX ORDER — MEDROXYPROGESTERONE ACETATE 10 MG
10 TABLET ORAL DAILY
Qty: 90 TABLET | Refills: 1 | Status: SHIPPED | OUTPATIENT
Start: 2023-02-13 | End: 2023-08-09

## 2023-02-13 RX ORDER — LOSARTAN POTASSIUM 25 MG/1
25 TABLET ORAL DAILY
Qty: 90 TABLET | Refills: 1 | Status: SHIPPED | OUTPATIENT
Start: 2023-02-13 | End: 2023-08-09

## 2023-02-13 NOTE — TELEPHONE ENCOUNTER
Requesting to [facility]     medroxyPROGESTERone (PROVERA) 10 MG tablet      Last Written Prescription Date:  8/29/22  Last Fill Quantity: 90,   # refills: 3  Last Office Visit: 11/23/22  Future Office visit:       Routing refill request to provider for review/approval because:    Drug not on the [facility] P or [facility] refill protocol or controlled substance    losartan (COZAAR) 25 MG tablet        Last Written Prescription Date:  8/29/22  Last Fill Quantity: 90,   # refills: 3  Last Office Visit: 11/23/22  Future Office visit:       Routing refill request to provider for review/approval because:      Angiotensin-II Receptors Failed 02/09/2023 08:38 AM   Protocol Details  Last blood pressure under 140/90 in past 12 months     BP Readings from Last 3 Encounters:   12/06/22 154/94   12/02/22 117/79   11/23/22 120/82

## 2023-02-24 DIAGNOSIS — E87.6 HYPOKALEMIA: ICD-10-CM

## 2023-02-24 DIAGNOSIS — E66.01 MORBID OBESITY (H): ICD-10-CM

## 2023-02-24 DIAGNOSIS — E55.9 VITAMIN D DEFICIENCY: ICD-10-CM

## 2023-02-24 DIAGNOSIS — I10 BENIGN ESSENTIAL HYPERTENSION: Primary | ICD-10-CM

## 2023-02-24 DIAGNOSIS — F33.1 MODERATE EPISODE OF RECURRENT MAJOR DEPRESSIVE DISORDER (H): ICD-10-CM

## 2023-02-24 NOTE — TELEPHONE ENCOUNTER
Zoloft       Last Written Prescription Date:  8/29/22  Last Fill Quantity: 135,   # refills: 3  Last Office Visit: 11/23/22  Future Office visit:       Metformin       Last Written Prescription Date:  8/29/22  Last Fill Quantity: 120,   # refills: 3    Potassium       Last Written Prescription Date:  8/24/22  Last Fill Quantity: 90,   # refills: 0    Metoprolol       Last Written Prescription Date:  4/20/22  Last Fill Quantity: 90,   # refills: 0    Vit D      Last Written Prescription Date:  Reported   Last Fill Quantity: 200,   # refills: 0  Last Office Visit: 11/23/22  Future Office visit:

## 2023-03-02 RX ORDER — METFORMIN HCL 500 MG
1000 TABLET, EXTENDED RELEASE 24 HR ORAL 2 TIMES DAILY WITH MEALS
Qty: 120 TABLET | Refills: 3 | Status: SHIPPED | OUTPATIENT
Start: 2023-03-02 | End: 2023-10-03

## 2023-03-02 RX ORDER — SERTRALINE HYDROCHLORIDE 100 MG/1
TABLET, FILM COATED ORAL
Qty: 135 TABLET | Refills: 3 | Status: SHIPPED | OUTPATIENT
Start: 2023-03-02 | End: 2023-10-03

## 2023-03-02 RX ORDER — POTASSIUM CHLORIDE 750 MG/1
10 TABLET, EXTENDED RELEASE ORAL DAILY
Qty: 90 TABLET | Refills: 1 | Status: SHIPPED | OUTPATIENT
Start: 2023-03-02 | End: 2023-08-09

## 2023-03-02 RX ORDER — METOPROLOL SUCCINATE 25 MG/1
25 TABLET, EXTENDED RELEASE ORAL DAILY
Qty: 90 TABLET | Refills: 1 | Status: SHIPPED | OUTPATIENT
Start: 2023-03-02 | End: 2023-08-09

## 2023-03-17 ENCOUNTER — ANCILLARY PROCEDURE (OUTPATIENT)
Dept: MAMMOGRAPHY | Facility: OTHER | Age: 48
End: 2023-03-17
Attending: FAMILY MEDICINE
Payer: COMMERCIAL

## 2023-03-17 DIAGNOSIS — Z12.31 VISIT FOR SCREENING MAMMOGRAM: ICD-10-CM

## 2023-03-17 PROCEDURE — 77063 BREAST TOMOSYNTHESIS BI: CPT | Mod: TC | Performed by: RADIOLOGY

## 2023-03-17 PROCEDURE — 77067 SCR MAMMO BI INCL CAD: CPT | Mod: TC | Performed by: RADIOLOGY

## 2023-03-20 ENCOUNTER — TELEPHONE (OUTPATIENT)
Dept: MAMMOGRAPHY | Facility: OTHER | Age: 48
End: 2023-03-20

## 2023-04-02 ENCOUNTER — HOSPITAL ENCOUNTER (EMERGENCY)
Facility: HOSPITAL | Age: 48
Discharge: HOME | End: 2023-04-02
Attending: PHYSICIAN ASSISTANT | Admitting: PHYSICIAN ASSISTANT
Payer: COMMERCIAL

## 2023-04-02 ENCOUNTER — E-VISIT (OUTPATIENT)
Dept: URGENT CARE | Facility: CLINIC | Age: 48
End: 2023-04-02
Payer: COMMERCIAL

## 2023-04-02 VITALS
DIASTOLIC BLOOD PRESSURE: 104 MMHG | HEART RATE: 80 BPM | TEMPERATURE: 100.8 F | SYSTOLIC BLOOD PRESSURE: 161 MMHG | RESPIRATION RATE: 20 BRPM | OXYGEN SATURATION: 100 %

## 2023-04-02 DIAGNOSIS — R30.0 DYSURIA: Primary | ICD-10-CM

## 2023-04-02 DIAGNOSIS — N30.01 ACUTE CYSTITIS WITH HEMATURIA: ICD-10-CM

## 2023-04-02 LAB
ALBUMIN UR-MCNC: 100 MG/DL
APPEARANCE UR: CLEAR
BACTERIA #/AREA URNS HPF: ABNORMAL /HPF
BILIRUB UR QL STRIP: NEGATIVE
COLOR UR AUTO: ABNORMAL
GLUCOSE UR STRIP-MCNC: NEGATIVE MG/DL
HGB UR QL STRIP: ABNORMAL
KETONES UR STRIP-MCNC: NEGATIVE MG/DL
LEUKOCYTE ESTERASE UR QL STRIP: ABNORMAL
MUCOUS THREADS #/AREA URNS LPF: PRESENT /LPF
NITRATE UR QL: NEGATIVE
PH UR STRIP: 6.5 [PH] (ref 4.7–8)
RBC URINE: 47 /HPF
SP GR UR STRIP: 1.01 (ref 1–1.03)
SQUAMOUS EPITHELIAL: 1 /HPF
UROBILINOGEN UR STRIP-MCNC: NORMAL MG/DL
WBC URINE: 4 /HPF

## 2023-04-02 PROCEDURE — 87086 URINE CULTURE/COLONY COUNT: CPT | Performed by: PHYSICIAN ASSISTANT

## 2023-04-02 PROCEDURE — G0463 HOSPITAL OUTPT CLINIC VISIT: HCPCS | Mod: 25

## 2023-04-02 PROCEDURE — 99213 OFFICE O/P EST LOW 20 MIN: CPT | Performed by: PHYSICIAN ASSISTANT

## 2023-04-02 PROCEDURE — 250N000013 HC RX MED GY IP 250 OP 250 PS 637: Performed by: PHYSICIAN ASSISTANT

## 2023-04-02 PROCEDURE — 81001 URINALYSIS AUTO W/SCOPE: CPT | Performed by: PHYSICIAN ASSISTANT

## 2023-04-02 PROCEDURE — 99207 PR NON-BILLABLE SERV PER CHARTING: CPT | Performed by: FAMILY MEDICINE

## 2023-04-02 PROCEDURE — 250N000011 HC RX IP 250 OP 636: Performed by: PHYSICIAN ASSISTANT

## 2023-04-02 PROCEDURE — 96372 THER/PROPH/DIAG INJ SC/IM: CPT | Performed by: PHYSICIAN ASSISTANT

## 2023-04-02 RX ORDER — CEFTRIAXONE SODIUM 1 G
1 VIAL (EA) INJECTION ONCE
Status: COMPLETED | OUTPATIENT
Start: 2023-04-02 | End: 2023-04-02

## 2023-04-02 RX ORDER — CIPROFLOXACIN 500 MG/1
500 TABLET, FILM COATED ORAL 2 TIMES DAILY
Qty: 6 TABLET | Refills: 0 | Status: SHIPPED | OUTPATIENT
Start: 2023-04-02 | End: 2023-04-05

## 2023-04-02 RX ORDER — PHENAZOPYRIDINE HYDROCHLORIDE 200 MG/1
200 TABLET, FILM COATED ORAL 3 TIMES DAILY PRN
Qty: 9 TABLET | Refills: 0 | Status: SHIPPED | OUTPATIENT
Start: 2023-04-02 | End: 2023-04-05

## 2023-04-02 RX ORDER — PHENAZOPYRIDINE HYDROCHLORIDE 100 MG/1
200 TABLET, FILM COATED ORAL ONCE
Status: COMPLETED | OUTPATIENT
Start: 2023-04-02 | End: 2023-04-02

## 2023-04-02 RX ADMIN — PHENAZOPYRIDINE HYDROCHLORIDE 200 MG: 100 TABLET ORAL at 19:27

## 2023-04-02 RX ADMIN — CEFTRIAXONE 1 G: 1 INJECTION, POWDER, FOR SOLUTION INTRAMUSCULAR; INTRAVENOUS at 19:26

## 2023-04-02 ASSESSMENT — ENCOUNTER SYMPTOMS
VOMITING: 0
TREMORS: 0
FACIAL ASYMMETRY: 0
EYE REDNESS: 0
DYSURIA: 1
FLANK PAIN: 0
CONFUSION: 0
FREQUENCY: 1
FEVER: 0
SEIZURES: 0
COUGH: 0
FACIAL SWELLING: 0

## 2023-04-02 ASSESSMENT — ACTIVITIES OF DAILY LIVING (ADL): ADLS_ACUITY_SCORE: 35

## 2023-04-02 NOTE — ED TRIAGE NOTES
Pt presents with complaints of bladder pressure, pain when stopping urination, frequency, and a fever. Pt reports she has not taken her blood pressure medication yet today.

## 2023-04-02 NOTE — PATIENT INSTRUCTIONS
Dear Vannessa Gupta,    We are sorry you are not feeling well. Based on the responses you provided, it is recommended that you be seen in-person in urgent care so we can better evaluate your symptoms. Please click here to find the nearest urgent care location to you.   You will not be charged for this Visit. Thank you for trusting us with your care.    Gisselle Velasquez MD

## 2023-04-02 NOTE — ED TRIAGE NOTES
Patient presents to urgent care for possible UTI. Patient states she has bladder pressure, frequency, fever, pain when stopping urination.   Patient reports has not taken her blood pressure medication today.

## 2023-04-03 LAB — BACTERIA UR CULT: NORMAL

## 2023-04-03 NOTE — ED PROVIDER NOTES
History     Chief Complaint   Patient presents with     Dysuria     HPI  Vannessa Gupta is a 47 year old female who is here with UTI-like symptoms.  She reports she has had urgency and some bladder pressure as well as frequency.  She has pain when she stops urination.  She has developed a fever as well.  She was somewhat nauseated and has not taken her blood pressure medicine for today.  She states her symptoms started a couple days ago.  Denies any other issues at this time.    Allergies:  Allergies   Allergen Reactions     Iodine Other (See Comments) and Rash     (shrimp) lips swollen       Problem List:    Patient Active Problem List    Diagnosis Date Noted     Other specified anemias 04/20/2022     Priority: Medium     Cardiomyopathy, unspecified type (H) 04/11/2022     Priority: Medium     Formatting of this note might be different from the original.  Added automatically from request for surgery [id number]       Obstructive sleep apnea syndrome 04/11/2022     Priority: Medium     Formatting of this note might be different from the original.  Added automatically from request for surgery [id number]       Status post total replacement of right hip 02/14/2022     Priority: Medium     Arthritis of left hip 01/06/2022     Priority: Medium     Formatting of this note might be different from the original.  Added automatically from request for surgery [id number]       Opioid use, unspecified, uncomplicated 01/06/2022     Priority: Medium     Chronic pain of both knees 10/26/2021     Priority: Medium     Knee joint stiffness, bilateral 10/26/2021     Priority: Medium     Muscle weakness 10/26/2021     Priority: Medium     Premenopausal patient 09/30/2021     Priority: Medium     Osteoarthritis of both hips 09/17/2020     Priority: Medium     Menorrhagia with regular cycle--US normal--PROVERA daily---10/2019 10/23/2019     Priority: Medium     Morbid obesity (H) 10/10/2019     Priority: Medium     Insulin resistance syndrome  2018     Priority: Medium     Benign essential hypertension 2015     Priority: Medium     Vitamin D deficiency 2015     Priority: Medium     Major depressive disorder, recurrent episode (H) 2011     Priority: Medium     Problem list name updated by automated process. Provider to review       GERD (gastroesophageal reflux diseae) 2011     Priority: Medium        Past Medical History:    Past Medical History:   Diagnosis Date     Benign essential hypertension 2015     Cardiomyopathy, unspecified (H)      Depressive disorder      GERD (gastroesophageal reflux diseae) 2011     Heart failure with reduced ejection fraction (H)      Major depressive affective disorder, recurrent episode, unspecified 2011     Obesity  2011     Primary osteoarthritis of both hips      Vitamin D deficiency 2015       Past Surgical History:    Past Surgical History:   Procedure Laterality Date     ABDOMEN SURGERY  2005    I have had 2 c sections     ANGIOGRAM  2020    LHC - normal RCA/LCX; LAD luminal irregularities; Dr Hair; [facility]; plan cMRI     bilateral tubal ligation        section  ,         COLONOSCOPY N/A 2022    Procedure: Colonoscopy with polypectomy;  Surgeon: Jovanni Brown MD;  Location: [facility]     ENT SURGERY      Got my tonsils out     GYN SURGERY  2005         oligohydramnios       ORTHOPEDIC SURGERY  22 & 3-28-22    Total hip replacement left and right     Tonsillectomy       TOTAL HIP ARTHROPLASTY Right 2022    Dr De Leon     TOTAL HIP ARTHROPLASTY Left 2022    Dr De Leon     VASCULAR SURGERY  2022    Cath of both left and right side       Family History:    Family History   Problem Relation Age of Onset     Cancer Mother         Cervical     Depression Mother      Anxiety Disorder Mother      Osteoporosis Mother      Depression Father      Diabetes Father      Hypertension  Father      Other - See Comments Father         Cholecystectomy/Rheumatoid arthritis     Heart Disease Father      LUNG DISEASE Father      Liver Disease Father      Coronary Artery Disease Father      Cerebrovascular Disease Father      Anxiety Disorder Father      Osteoporosis Father      Other - See Comments Sister         Endometriosis     Anxiety Disorder Sister      Fibromyalgia Other         mom and sister     Asthma No family hx of      Thyroid Disease No family hx of        Social History:  Marital Status:   [2]  Social History     Tobacco Use     Smoking status: Former     Packs/day: 0.00     Years: 1.00     Pack years: 0.00     Types: Cigarettes     Quit date: 2004     Years since quittin.3     Smokeless tobacco: Never     Tobacco comments:     Tried to quit   Vaping Use     Vaping status: Never Used   Substance Use Topics     Alcohol use: Yes     Comment: hasnt drank in a year     Drug use: No        Medications:    cholecalciferol (VITAMIN D3) 5000 units (125 mcg) capsule  ferrous fumarate 65 mg, [place]. FE,-Vitamin C 125 mg (VITRON C)  MG TABS tablet  losartan (COZAAR) 25 MG tablet  magnesium oxide (MAG-OX) 400 MG tablet  medroxyPROGESTERone (PROVERA) 10 MG tablet  metFORMIN (GLUCOPHAGE XR) 500 MG 24 hr tablet  metoprolol succinate ER (TOPROL XL) 25 MG 24 hr tablet  potassium chloride ER (K-TAB/KLOR-CON) 10 MEQ CR tablet  pregabalin (LYRICA) 50 MG capsule  sertraline (ZOLOFT) 100 MG tablet  ciprofloxacin (CIPRO) 500 MG tablet  phenazopyridine (PYRIDIUM) 200 MG tablet          Review of Systems   Constitutional: Negative for fever.   HENT: Negative for drooling and facial swelling.    Eyes: Negative for redness.   Respiratory: Negative for cough.    Gastrointestinal: Negative for vomiting.   Genitourinary: Positive for dysuria, frequency and urgency. Negative for flank pain.   Skin: Negative for pallor.   Neurological: Negative for tremors, seizures and facial asymmetry.    Psychiatric/Behavioral: Negative for confusion.   All other systems reviewed and are negative.      Physical Exam   BP: (!) 161/104  Pulse: 80  Temp: (!) 100.8  F (38.2  C)  Resp: 20  SpO2: 100 %      Physical Exam  Vitals and nursing note reviewed.   Constitutional:       General: She is not in acute distress.     Appearance: Normal appearance. She is not ill-appearing or toxic-appearing.      Comments: Febrile temp is 100.8   HENT:      Head: Normocephalic.      Nose: Nose normal.   Eyes:      General: No scleral icterus.     Extraocular Movements: Extraocular movements intact.   Neck:      Trachea: No tracheal deviation.   Cardiovascular:      Rate and Rhythm: Normal rate.   Pulmonary:      Effort: Pulmonary effort is normal. No respiratory distress.   Abdominal:      Tenderness: There is no right CVA tenderness or left CVA tenderness.   Musculoskeletal:         General: Normal range of motion.      Cervical back: Normal range of motion.   Skin:     General: Skin is warm and dry.      Coloration: Skin is not jaundiced or pale.   Neurological:      General: No focal deficit present.      Mental Status: She is alert and oriented to person, place, and time.   Psychiatric:         Attention and Perception: Attention normal.         Mood and Affect: Mood normal.         ED Course       Results for orders placed or performed during the hospital encounter of 04/02/23 (from the past 24 hour(s))   UA with Microscopic reflex to Culture    Specimen: Urine, Midstream   Result Value Ref Range    Color Urine Light Yellow Colorless, Straw, Light Yellow, Yellow    Appearance Urine Clear Clear    Glucose Urine Negative Negative mg/dL    Bilirubin Urine Negative Negative    Ketones Urine Negative Negative mg/dL    Specific Gravity Urine 1.011 1.003 - 1.035    Blood Urine Moderate (A) Negative    pH Urine 6.5 4.7 - 8.0    Protein Albumin Urine 100 (A) Negative mg/dL    Urobilinogen Urine Normal Normal, 2.0 mg/dL    Nitrite Urine  Negative Negative    Leukocyte Esterase Urine Moderate (A) Negative    Bacteria Urine Few (A) None Seen /HPF    Mucus Urine Present (A) None Seen /LPF    RBC Urine 47 (H) <=2 /HPF    WBC Urine 4 <=5 /HPF    Squamous Epithelials Urine 1 <=1 /HPF    Narrative    Urine Culture ordered based on laboratory criteria       Medications   phenazopyridine (PYRIDIUM) tablet 200 mg (has no administration in time range)   cefTRIAXone (ROCEPHIN) in lidocaine 1% (PF) for IM administration 1 g (has no administration in time range)       Assessments & Plan (with Medical Decision Making)     I have reviewed the nursing notes.    I have reviewed the findings, diagnosis, plan and need for follow up with the patient.    New Prescriptions    CIPROFLOXACIN (CIPRO) 500 MG TABLET    Take 1 tablet (500 mg) by mouth 2 times daily for 3 days    PHENAZOPYRIDINE (PYRIDIUM) 200 MG TABLET    Take 1 tablet (200 mg) by mouth 3 times daily as needed for irritation or pain       Final diagnoses:   Acute cystitis with hematuria     Vannessa Gupta is a 47 year old female who is here with UTI-like symptoms.  She reports she has had urgency and some bladder pressure as well as frequency.  She has pain when she stops urination.  She has developed a fever as well.  She was somewhat nauseated and has not taken her blood pressure medicine for today.  She states her symptoms started a couple days ago.  Denies any other issues at this time..  The patient is febrile with a temp of 100.8.  Vital signs stable.  Physical exam is unremarkable.  Her UA returns with a moderate amount of blood in her urine and a moderate amount of leukocyte Estrace, and few bacteria.  This sample is borderline.  Lab will do a urinary culture.  However given her symptoms we will treat empirically at this time.  Pharmacy is closed.  The patient was given Pyridium orally as well as Rocephin IM.  Rx for 3-day course of Cipro that she can  and fill and start tomorrow as well as a  prescription for Pyridium.  I discussed increased fluid intake and continued monitoring return if there is any concerns for further evaluation as needed.    4/2/2023   [facility]     Salvador West PA-C  04/02/23 1926

## 2023-04-19 ENCOUNTER — TRANSFERRED RECORDS (OUTPATIENT)
Dept: HEALTH INFORMATION MANAGEMENT | Facility: CLINIC | Age: 48
End: 2023-04-19

## 2023-04-19 LAB — EJECTION FRACTION: 55.4 %

## 2023-05-03 ENCOUNTER — HOSPITAL ENCOUNTER (EMERGENCY)
Facility: HOSPITAL | Age: 48
Discharge: HOME | End: 2023-05-03
Attending: PHYSICIAN ASSISTANT | Admitting: PHYSICIAN ASSISTANT
Payer: COMMERCIAL

## 2023-05-03 ENCOUNTER — APPOINTMENT (OUTPATIENT)
Dept: CT IMAGING | Facility: HOSPITAL | Age: 48
End: 2023-05-03
Attending: PHYSICIAN ASSISTANT
Payer: COMMERCIAL

## 2023-05-03 VITALS
TEMPERATURE: 99.8 F | DIASTOLIC BLOOD PRESSURE: 88 MMHG | OXYGEN SATURATION: 100 % | RESPIRATION RATE: 16 BRPM | HEART RATE: 90 BPM | SYSTOLIC BLOOD PRESSURE: 139 MMHG

## 2023-05-03 DIAGNOSIS — K57.32 DIVERTICULITIS OF COLON: Primary | ICD-10-CM

## 2023-05-03 DIAGNOSIS — R79.82 ELEVATED C-REACTIVE PROTEIN (CRP): ICD-10-CM

## 2023-05-03 DIAGNOSIS — B37.31 YEAST INFECTION OF THE VAGINA: ICD-10-CM

## 2023-05-03 LAB
ALBUMIN SERPL BCG-MCNC: 3.9 G/DL (ref 3.5–5.2)
ALBUMIN UR-MCNC: 30 MG/DL
ALP SERPL-CCNC: 72 U/L (ref 35–104)
ALT SERPL W P-5'-P-CCNC: 18 U/L (ref 10–35)
ANION GAP SERPL CALCULATED.3IONS-SCNC: 10 MMOL/L (ref 7–15)
APPEARANCE UR: CLEAR
AST SERPL W P-5'-P-CCNC: 12 U/L (ref 10–35)
BACTERIA #/AREA URNS HPF: ABNORMAL /HPF
BASOPHILS # BLD AUTO: 0.1 10E3/UL (ref 0–0.2)
BASOPHILS NFR BLD AUTO: 1 %
BILIRUB DIRECT SERPL-MCNC: <0.2 MG/DL (ref 0–0.3)
BILIRUB SERPL-MCNC: <0.2 MG/DL
BILIRUB UR QL STRIP: NEGATIVE
BUN SERPL-MCNC: 8.3 MG/DL (ref 6–20)
CALCIUM SERPL-MCNC: 9.7 MG/DL (ref 8.6–10)
CHLORIDE SERPL-SCNC: 101 MMOL/L (ref 98–107)
CLUE CELLS: ABNORMAL
COLOR UR AUTO: ABNORMAL
CREAT SERPL-MCNC: 0.68 MG/DL (ref 0.51–0.95)
CRP SERPL-MCNC: 97.78 MG/L
DEPRECATED HCO3 PLAS-SCNC: 25 MMOL/L (ref 22–29)
EOSINOPHIL # BLD AUTO: 0.5 10E3/UL (ref 0–0.7)
EOSINOPHIL NFR BLD AUTO: 4 %
ERYTHROCYTE [DISTWIDTH] IN BLOOD BY AUTOMATED COUNT: 14.5 % (ref 10–15)
GFR SERPL CREATININE-BSD FRML MDRD: >90 ML/MIN/1.73M2
GLUCOSE SERPL-MCNC: 98 MG/DL (ref 70–99)
GLUCOSE UR STRIP-MCNC: NEGATIVE MG/DL
HCG UR QL: NEGATIVE
HCT VFR BLD AUTO: 38.6 % (ref 35–47)
HGB BLD-MCNC: 12.4 G/DL (ref 11.7–15.7)
HGB UR QL STRIP: ABNORMAL
IMM GRANULOCYTES # BLD: 0.1 10E3/UL
IMM GRANULOCYTES NFR BLD: 1 %
KETONES UR STRIP-MCNC: NEGATIVE MG/DL
LEUKOCYTE ESTERASE UR QL STRIP: NEGATIVE
LYMPHOCYTES # BLD AUTO: 3.2 10E3/UL (ref 0.8–5.3)
LYMPHOCYTES NFR BLD AUTO: 28 %
MCH RBC QN AUTO: 26.2 PG (ref 26.5–33)
MCHC RBC AUTO-ENTMCNC: 32.1 G/DL (ref 31.5–36.5)
MCV RBC AUTO: 82 FL (ref 78–100)
MONOCYTES # BLD AUTO: 0.7 10E3/UL (ref 0–1.3)
MONOCYTES NFR BLD AUTO: 7 %
MUCOUS THREADS #/AREA URNS LPF: PRESENT /LPF
NEUTROPHILS # BLD AUTO: 6.9 10E3/UL (ref 1.6–8.3)
NEUTROPHILS NFR BLD AUTO: 59 %
NITRATE UR QL: NEGATIVE
NRBC # BLD AUTO: 0 10E3/UL
NRBC BLD AUTO-RTO: 0 /100
PH UR STRIP: 6 [PH] (ref 4.7–8)
PLATELET # BLD AUTO: 515 10E3/UL (ref 150–450)
POTASSIUM SERPL-SCNC: 4.6 MMOL/L (ref 3.4–5.3)
PROT SERPL-MCNC: 8.2 G/DL (ref 6.4–8.3)
RBC # BLD AUTO: 4.73 10E6/UL (ref 3.8–5.2)
RBC URINE: <1 /HPF
SODIUM SERPL-SCNC: 136 MMOL/L (ref 136–145)
SP GR UR STRIP: 1 (ref 1–1.03)
SQUAMOUS EPITHELIAL: 0 /HPF
TRICHOMONAS, WET PREP: ABNORMAL
UROBILINOGEN UR STRIP-MCNC: NORMAL MG/DL
WBC # BLD AUTO: 11.4 10E3/UL (ref 4–11)
WBC URINE: 3 /HPF
WBC'S/HIGH POWER FIELD, WET PREP: ABNORMAL
YEAST, WET PREP: PRESENT

## 2023-05-03 PROCEDURE — 86140 C-REACTIVE PROTEIN: CPT | Performed by: PHYSICIAN ASSISTANT

## 2023-05-03 PROCEDURE — 250N000011 HC RX IP 250 OP 636: Performed by: PHYSICIAN ASSISTANT

## 2023-05-03 PROCEDURE — 81025 URINE PREGNANCY TEST: CPT | Performed by: PHYSICIAN ASSISTANT

## 2023-05-03 PROCEDURE — 74176 CT ABD & PELVIS W/O CONTRAST: CPT

## 2023-05-03 PROCEDURE — 87210 SMEAR WET MOUNT SALINE/INK: CPT | Performed by: PHYSICIAN ASSISTANT

## 2023-05-03 PROCEDURE — 82248 BILIRUBIN DIRECT: CPT | Performed by: PHYSICIAN ASSISTANT

## 2023-05-03 PROCEDURE — 96372 THER/PROPH/DIAG INJ SC/IM: CPT | Performed by: PHYSICIAN ASSISTANT

## 2023-05-03 PROCEDURE — 36415 COLL VENOUS BLD VENIPUNCTURE: CPT | Performed by: PHYSICIAN ASSISTANT

## 2023-05-03 PROCEDURE — G0463 HOSPITAL OUTPT CLINIC VISIT: HCPCS | Mod: 25

## 2023-05-03 PROCEDURE — 250N000013 HC RX MED GY IP 250 OP 250 PS 637: Performed by: PHYSICIAN ASSISTANT

## 2023-05-03 PROCEDURE — 99214 OFFICE O/P EST MOD 30 MIN: CPT | Performed by: PHYSICIAN ASSISTANT

## 2023-05-03 PROCEDURE — 80053 COMPREHEN METABOLIC PANEL: CPT | Performed by: PHYSICIAN ASSISTANT

## 2023-05-03 PROCEDURE — 81001 URINALYSIS AUTO W/SCOPE: CPT | Performed by: PHYSICIAN ASSISTANT

## 2023-05-03 PROCEDURE — 81001 URINALYSIS AUTO W/SCOPE: CPT | Performed by: INTERNAL MEDICINE

## 2023-05-03 PROCEDURE — 85025 COMPLETE CBC W/AUTO DIFF WBC: CPT | Performed by: PHYSICIAN ASSISTANT

## 2023-05-03 RX ORDER — OXYCODONE HYDROCHLORIDE 5 MG/1
5 TABLET ORAL EVERY 12 HOURS PRN
Qty: 8 TABLET | Refills: 0 | Status: SHIPPED | OUTPATIENT
Start: 2023-05-03 | End: 2023-05-06

## 2023-05-03 RX ORDER — KETOROLAC TROMETHAMINE 30 MG/ML
30 INJECTION, SOLUTION INTRAMUSCULAR; INTRAVENOUS ONCE
Status: COMPLETED | OUTPATIENT
Start: 2023-05-03 | End: 2023-05-03

## 2023-05-03 RX ORDER — KETOROLAC TROMETHAMINE 30 MG/ML
30 INJECTION, SOLUTION INTRAMUSCULAR; INTRAVENOUS ONCE
Status: DISCONTINUED | OUTPATIENT
Start: 2023-05-03 | End: 2023-05-03

## 2023-05-03 RX ORDER — FLUCONAZOLE 150 MG/1
TABLET ORAL
Qty: 2 TABLET | Refills: 0 | Status: SHIPPED | OUTPATIENT
Start: 2023-05-03 | End: 2023-05-06

## 2023-05-03 RX ORDER — CIPROFLOXACIN 500 MG/1
500 TABLET, FILM COATED ORAL 2 TIMES DAILY
Qty: 14 TABLET | Refills: 0 | Status: SHIPPED | OUTPATIENT
Start: 2023-05-03 | End: 2023-05-10

## 2023-05-03 RX ORDER — METRONIDAZOLE 500 MG/1
500 TABLET ORAL ONCE
Status: COMPLETED | OUTPATIENT
Start: 2023-05-03 | End: 2023-05-03

## 2023-05-03 RX ORDER — IOPAMIDOL 755 MG/ML
143 INJECTION, SOLUTION INTRAVASCULAR ONCE
Status: DISCONTINUED | OUTPATIENT
Start: 2023-05-03 | End: 2023-05-03

## 2023-05-03 RX ORDER — METRONIDAZOLE 500 MG/1
500 TABLET ORAL 2 TIMES DAILY
Qty: 14 TABLET | Refills: 0 | Status: SHIPPED | OUTPATIENT
Start: 2023-05-03 | End: 2023-05-10

## 2023-05-03 RX ORDER — CIPROFLOXACIN 500 MG/1
500 TABLET, FILM COATED ORAL ONCE
Status: COMPLETED | OUTPATIENT
Start: 2023-05-03 | End: 2023-05-03

## 2023-05-03 RX ADMIN — KETOROLAC TROMETHAMINE 30 MG: 30 INJECTION, SOLUTION INTRAMUSCULAR; INTRAVENOUS at 23:01

## 2023-05-03 RX ADMIN — METRONIDAZOLE 500 MG: 500 TABLET ORAL at 23:00

## 2023-05-03 RX ADMIN — CIPROFLOXACIN HYDROCHLORIDE 500 MG: 500 TABLET, FILM COATED ORAL at 23:00

## 2023-05-03 ASSESSMENT — ACTIVITIES OF DAILY LIVING (ADL)
ADLS_ACUITY_SCORE: 35
ADLS_ACUITY_SCORE: 35

## 2023-05-03 NOTE — Clinical Note
Vannessa Gupta was seen and treated in our emergency department on 5/3/2023.  She may return to work on 05/08/2023.  Please excuse Vannessa from work through 5/8/23 as needed. She may return sooner if feeling improved.      If you have any questions or concerns, please don't hesitate to call.      Arin Butt PA-C

## 2023-05-04 ENCOUNTER — TELEPHONE (OUTPATIENT)
Dept: FAMILY MEDICINE | Facility: OTHER | Age: 48
End: 2023-05-04

## 2023-05-04 ASSESSMENT — ENCOUNTER SYMPTOMS
WOUND: 0
APPETITE CHANGE: 0
FREQUENCY: 1
CONFUSION: 0
DIARRHEA: 0
FEVER: 1
HEADACHES: 0
SORE THROAT: 0
SHORTNESS OF BREATH: 0
DYSURIA: 1
BACK PAIN: 0
FATIGUE: 1
COUGH: 0
DIZZINESS: 0
VOMITING: 0
HEMATURIA: 0
NAUSEA: 1
ABDOMINAL PAIN: 1

## 2023-05-04 NOTE — DISCHARGE INSTRUCTIONS
Please call your primary care provider for a follow-up on Friday  -Please do a clear liquid diet for 1-2 days, then advance slowly to a soft diet.    Please return with vomiting, worsening of pain, dizziness, or other concerns    Thank you

## 2023-05-04 NOTE — ED TRIAGE NOTES
Pt presents with c/o uti sx  Sx started about a week ago was in once a few weeks ago did get a UA in ER.  Sx then came back a week ago.  Urgency, frequency, burning,pressure, smell

## 2023-05-04 NOTE — TELEPHONE ENCOUNTER
Request for possible overbook:    Patient was seen in ED for diverticulitis and yeast infection. Patient denies any new or worsening symptoms. ED recommends patient follows up within 2 days. Request for possible overbook being sent to PCP due to no available appointments. PCP to advise.  Writer informed patient to call back or be reseen in UC/ED with any new or worsening symptoms. Patient verbalized understanding.    Emergency Department and Urgent Care Follow-up      Reason for ER/UC visit: diverticulitis, yeast infection  o Date seen: 05/03/23      New or Worsening symptoms:  Nothing new or worsening       Prescription Received/Picked up from Pharmacy?: Cipro, flagyl, Diflucan, oxycodone   o Medications started? yes  o Any questions or issues regarding your prescription?: no      Follow-up Results or Labs that are pending: everything completed      Questions or concerns?: no      ER Recommends Follow-up by: within 2 days      RN Recommendations: be reseen in UC/ED with any new or worsening symtoms.  o Appointment scheduled: Request for possible overbook.    If you start feeling worse, or have any further questions, please feel free to contact Nurse Triage at [phone].  If needing immediate medical attention at any time please call 911/Go to the ER.

## 2023-05-04 NOTE — ED PROVIDER NOTES
History     Chief Complaint   Patient presents with     UTI     HPI  Vannessa Gupta is a 47 year old female who presents to urgent care with concerns of lower abdominal pressure and urinary symptoms. She was seen in this ER on 4/2/2023 and treated with p.o. ciprofloxacin as well as IM Rocephin for concerns of cystitis.  Chart review shows urine culture grew back mixed urogenital kamilah.  She states that her symptoms never completely resolved following that visit.  Since then, she has had progressive worsening of her dysuria and frequency.  Her lower abdominal pressure persist.  She was noted to have a fever during the visit on 4/2/2023 and states that she has not checked her temperature but often feels flushed.  No vomiting.  Past surgical history includes two prior C-sections and salpingectomy.  Denies vaginal discharge.  Denies concern of STDs.  No known prior history of nephrolithiasis.      Allergies:  Allergies   Allergen Reactions     Iodine Other (See Comments) and Rash     (shrimp) lips swollen       Problem List:    Patient Active Problem List    Diagnosis Date Noted     Other specified anemias 04/20/2022     Priority: Medium     Cardiomyopathy, unspecified type (H) 04/11/2022     Priority: Medium     Formatting of this note might be different from the original.  Added automatically from request for surgery [id number]       Obstructive sleep apnea syndrome 04/11/2022     Priority: Medium     Formatting of this note might be different from the original.  Added automatically from request for surgery [id number]       Status post total replacement of right hip 02/14/2022     Priority: Medium     Arthritis of left hip 01/06/2022     Priority: Medium     Formatting of this note might be different from the original.  Added automatically from request for surgery [id number]       Opioid use, unspecified, uncomplicated 01/06/2022     Priority: Medium     Chronic pain of both knees 10/26/2021     Priority: Medium     Knee  joint stiffness, bilateral 10/26/2021     Priority: Medium     Muscle weakness 10/26/2021     Priority: Medium     Premenopausal patient 2021     Priority: Medium     Osteoarthritis of both hips 2020     Priority: Medium     Menorrhagia with regular cycle--US normal--PROVERA daily---10/2019 10/23/2019     Priority: Medium     Morbid obesity (H) 10/10/2019     Priority: Medium     Insulin resistance syndrome 2018     Priority: Medium     Benign essential hypertension 2015     Priority: Medium     Vitamin D deficiency 2015     Priority: Medium     Major depressive disorder, recurrent episode (H) 2011     Priority: Medium     Problem list name updated by automated process. Provider to review       GERD (gastroesophageal reflux diseae) 2011     Priority: Medium        Past Medical History:    Past Medical History:   Diagnosis Date     Benign essential hypertension 2015     Cardiomyopathy, unspecified (H)      Depressive disorder      GERD (gastroesophageal reflux diseae) 2011     Heart failure with reduced ejection fraction (H)      Major depressive affective disorder, recurrent episode, unspecified 2011     Obesity  2011     Primary osteoarthritis of both hips      Vitamin D deficiency 2015       Past Surgical History:    Past Surgical History:   Procedure Laterality Date     ABDOMEN SURGERY  2005    I have had 2 c sections     ANGIOGRAM  2020    LHC - normal RCA/LCX; LAD luminal irregularities; Dr Hair; [facility]; plan cMRI     bilateral tubal ligation        section  ,         COLONOSCOPY N/A 2022    Procedure: Colonoscopy with polypectomy;  Surgeon: Jovanni Brown MD;  Location: [facility] OR     ENT SURGERY      Got my tonsils out     GYN SURGERY  2005         oligohydramnios       ORTHOPEDIC SURGERY  22 & 3-28-22    Total hip replacement left and right     Tonsillectomy       TOTAL HIP  ARTHROPLASTY Right 2022    Dr De Leon     TOTAL HIP ARTHROPLASTY Left 2022    Dr De Leon     VASCULAR SURGERY  2022    Cath of both left and right side       Family History:    Family History   Problem Relation Age of Onset     Cancer Mother         Cervical     Depression Mother      Anxiety Disorder Mother      Osteoporosis Mother      Depression Father      Diabetes Father      Hypertension Father      Other - See Comments Father         Cholecystectomy/Rheumatoid arthritis     Heart Disease Father      LUNG DISEASE Father      Liver Disease Father      Coronary Artery Disease Father      Cerebrovascular Disease Father      Anxiety Disorder Father      Osteoporosis Father      Other - See Comments Sister         Endometriosis     Anxiety Disorder Sister      Fibromyalgia Other         mom and sister     Asthma No family hx of      Thyroid Disease No family hx of        Social History:  Marital Status:   [2]  Social History     Tobacco Use     Smoking status: Former     Packs/day: 0.00     Years: 1.00     Pack years: 0.00     Types: Cigarettes     Quit date: 2004     Years since quittin.4     Smokeless tobacco: Never     Tobacco comments:     Tried to quit   Vaping Use     Vaping status: Never Used   Substance Use Topics     Alcohol use: Yes     Comment: hasnt drank in a year     Drug use: No        Medications:    ciprofloxacin (CIPRO) 500 MG tablet  fluconazole (DIFLUCAN) 150 MG tablet  metroNIDAZOLE (FLAGYL) 500 MG tablet  oxyCODONE (ROXICODONE) 5 MG tablet  cholecalciferol (VITAMIN D3) 5000 units (125 mcg) capsule  ferrous fumarate 65 mg, [place]. FE,-Vitamin C 125 mg (VITRON C)  MG TABS tablet  losartan (COZAAR) 25 MG tablet  magnesium oxide (MAG-OX) 400 MG tablet  medroxyPROGESTERone (PROVERA) 10 MG tablet  metFORMIN (GLUCOPHAGE XR) 500 MG 24 hr tablet  metoprolol succinate ER (TOPROL XL) 25 MG 24 hr tablet  potassium chloride ER (K-TAB/KLOR-CON) 10 MEQ CR  tablet  pregabalin (LYRICA) 50 MG capsule  sertraline (ZOLOFT) 100 MG tablet          Review of Systems   Constitutional: Positive for fatigue and fever. Negative for appetite change.   HENT: Negative for sore throat.    Respiratory: Negative for cough and shortness of breath.    Cardiovascular: Negative for chest pain.   Gastrointestinal: Positive for abdominal pain and nausea. Negative for diarrhea and vomiting.   Genitourinary: Positive for dysuria, frequency and urgency. Negative for hematuria.   Musculoskeletal: Negative for back pain.   Skin: Negative for wound.   Allergic/Immunologic: Negative for immunocompromised state.   Neurological: Negative for dizziness and headaches.   Psychiatric/Behavioral: Negative for confusion.       Physical Exam   BP: 139/88  Pulse: 90  Temp: 99.8  F (37.7  C)  Resp: 16  SpO2: 100 %      Physical Exam  Vitals and nursing note reviewed.   Constitutional:       General: She is not in acute distress.     Appearance: Normal appearance. She is not ill-appearing, toxic-appearing or diaphoretic.   HENT:      Head: Normocephalic and atraumatic.   Cardiovascular:      Rate and Rhythm: Normal rate and regular rhythm.      Pulses: Normal pulses.      Heart sounds: Normal heart sounds. No murmur heard.     No gallop.   Pulmonary:      Effort: Pulmonary effort is normal. No respiratory distress.      Breath sounds: Normal breath sounds. No rales.   Abdominal:      General: There is no distension.      Palpations: There is no fluid wave.      Tenderness: There is no right CVA tenderness, left CVA tenderness or rebound. Negative signs include Trivedi's sign.      Hernia: No hernia is present.      Comments: Most tender lower abdomen just to right of midline without true guarding. Non-peritoneal.   Skin:     Coloration: Skin is not pale.   Neurological:      General: No focal deficit present.      Mental Status: She is alert and oriented to person, place, and time.   Psychiatric:         Mood  and Affect: Mood normal.         Behavior: Behavior normal.         ED Course              ED Course as of 05/04/23 0858   Wed May 03, 2023   2111 WBC(!): 11.4   2133 CRP Inflammation(!): 97.78   2236 Waiting on CT read     Procedures              Results for orders placed or performed during the hospital encounter of 05/03/23 (from the past 24 hour(s))   UA with Microscopic reflex to Culture    Specimen: Urine, Midstream   Result Value Ref Range    Color Urine Straw Colorless, Straw, Light Yellow, Yellow    Appearance Urine Clear Clear    Glucose Urine Negative Negative mg/dL    Bilirubin Urine Negative Negative    Ketones Urine Negative Negative mg/dL    Specific Gravity Urine 1.005 1.003 - 1.035    Blood Urine Trace (A) Negative    pH Urine 6.0 4.7 - 8.0    Protein Albumin Urine 30 (A) Negative mg/dL    Urobilinogen Urine Normal Normal, 2.0 mg/dL    Nitrite Urine Negative Negative    Leukocyte Esterase Urine Negative Negative    Bacteria Urine Few (A) None Seen /HPF    Mucus Urine Present (A) None Seen /LPF    RBC Urine <1 <=2 /HPF    WBC Urine 3 <=5 /HPF    Squamous Epithelials Urine 0 <=1 /HPF    Narrative    Urine Culture not indicated   HCG qualitative urine   Result Value Ref Range    hCG Urine Qualitative Negative Negative   Wet prep    Specimen: Vagina; Swab   Result Value Ref Range    Trichomonas Absent Absent    Yeast Present (A) Absent    Clue Cells Absent Absent    WBCs/high power field 2+ (A) None   CBC with platelets differential    Narrative    The following orders were created for panel order CBC with platelets differential.  Procedure                               Abnormality         Status                     ---------                               -----------         ------                     CBC with platelets and d...[[id number]]  Abnormal            Final result                 Please view results for these tests on the individual orders.   Basic metabolic panel   Result Value Ref Range     Sodium 136 136 - 145 mmol/L    Potassium 4.6 3.4 - 5.3 mmol/L    Chloride 101 98 - 107 mmol/L    Carbon Dioxide (CO2) 25 22 - 29 mmol/L    Anion Gap 10 7 - 15 mmol/L    Urea Nitrogen 8.3 6.0 - 20.0 mg/dL    Creatinine 0.68 0.51 - 0.95 mg/dL    Calcium 9.7 8.6 - 10.0 mg/dL    Glucose 98 70 - 99 mg/dL    GFR Estimate >90 >60 mL/min/1.73m2   CRP inflammation   Result Value Ref Range    CRP Inflammation 97.78 (H) <5.00 mg/L   Hepatic panel   Result Value Ref Range    Protein Total 8.2 6.4 - 8.3 g/dL    Albumin 3.9 3.5 - 5.2 g/dL    Bilirubin Total <0.2 <=1.2 mg/dL    Alkaline Phosphatase 72 35 - 104 U/L    AST 12 10 - 35 U/L    ALT 18 10 - 35 U/L    Bilirubin Direct <0.20 0.00 - 0.30 mg/dL   CBC with platelets and differential   Result Value Ref Range    WBC Count 11.4 (H) 4.0 - 11.0 10e3/uL    RBC Count 4.73 3.80 - 5.20 10e6/uL    Hemoglobin 12.4 11.7 - 15.7 g/dL    Hematocrit 38.6 35.0 - 47.0 %    MCV 82 78 - 100 fL    MCH 26.2 (L) 26.5 - 33.0 pg    MCHC 32.1 31.5 - 36.5 g/dL    RDW 14.5 10.0 - 15.0 %    Platelet Count 515 (H) 150 - 450 10e3/uL    % Neutrophils 59 %    % Lymphocytes 28 %    % Monocytes 7 %    % Eosinophils 4 %    % Basophils 1 %    % Immature Granulocytes 1 %    NRBCs per 100 WBC 0 <1 /100    Absolute Neutrophils 6.9 1.6 - 8.3 10e3/uL    Absolute Lymphocytes 3.2 0.8 - 5.3 10e3/uL    Absolute Monocytes 0.7 0.0 - 1.3 10e3/uL    Absolute Eosinophils 0.5 0.0 - 0.7 10e3/uL    Absolute Basophils 0.1 0.0 - 0.2 10e3/uL    Absolute Immature Granulocytes 0.1 <=0.4 10e3/uL    Absolute NRBCs 0.0 10e3/uL   CT Abdomen Pelvis w/o Contrast    Narrative    Exam: CT ABDOMEN PELVIS W/O CONTRAST    Exam reason: fever, lower abdominal pain    Technique: Using helical CT technique, axial images of the abdomen and  pelvis  were acquired without administration of IV contrast. Coronal  and sagittal reformats were performed. This CT was performed using one  or more of the following dose reduction techniques: automated  exposure  control, adjustment of the mA and/or kV according to patient size,  and/or use of iterative reconstruction technique.    Comparison: None.    FINDINGS:    NOTE: Noncontrast images are insensitive for detection of solid organ  abnormalities and some other types of pathology.    ABDOMEN:     Liver:  No focal mass.    Gallbladder:  There are calcified gallstones. No gallbladder wall  thickening or pericholecystic fat stranding.  Bile Ducts:  No significantly dilated ducts.  Spleen:  Normal size without focal abnormality.  Kidneys:  No hydronephrosis. No calculi within either kidney, ureter,  or the bladder. No definite solid mass.  Adrenals:  No nodules.  Pancreas:  No mass or peripancreatic fat stranding.   Lymph Nodes:   No significant adenopathy.   Vascular:  No aortic aneurysm.   Abdominal wall:   No acute findings.    Pelvis: Partially obscured by streak artifact from bilateral hip  arthroplasties. No definite mass or adenopathy.    Bowel/Mesentery/Peritoneum:   -No evidence of bowel obstruction.   -There is descending and sigmoid diverticulosis. There is wall  thickening and pericolonic fat stranding of the distal sigmoid colon.  No additional abdominal free air. No defined fluid collection.  -No ascites.    Visualized portion of the Chest: No consolidation or pleural effusion.      Musculoskeletal: No acute osseous abnormalities. There is trace  anterolisthesis of L5 on S1 with bilateral pars interarticularis  defects. Bilateral hip arthroplasties are present.       Impression    IMPRESSION:    Sigmoid diverticulitis without evidence of perforation.    Cholelithiasis without evidence of acute cholecystitis.    GEENA ULRICH MD         SYSTEM ID:  [id number]       Medications   ketorolac (TORADOL) injection 30 mg (30 mg Intramuscular $Given 5/3/23 2301)   ciprofloxacin (CIPRO) tablet 500 mg (500 mg Oral $Given 5/3/23 2300)   metroNIDAZOLE (FLAGYL) tablet 500 mg (500 mg Oral $Given 5/3/23 2300)        Assessments & Plan (with Medical Decision Making)     I have reviewed the nursing notes.    I have reviewed the findings, diagnosis, plan and need for follow up with the patient.     Vannessa presented to  for evaluation of urinary symptoms with lower abdominal pain. DDX broad and inclusive of UTI, nephrolithiasis, diverticulitis, appendicitis, cholecystitis. Charts reviewed from 4/2/23 visit-- although UA was suggestive of infection at that time, her urine culture grew only mixed urogenital kamilah. Today's UA was not suggestive of infection. Reviewed this with Vannessa and suggested further work-up including serologic testing.     She had a mild leukocytosis, and CRP came back at 97. This was far above prior. Reviewed suggestion for CT imaging. Unfortunately her allergy prevented use of iodinated contrast. Nonetheless was able to obtain CT revealing diverticulitis without evidence of abscess or perforation. There was also a notable gallstone without signs of cholecystitic- liver enzymes all within normal limits and her pain was much lower in abdomen. Pain felt to much more likely represent diverticulitis with irritation of her bladder.     Reviewed option for admission for pain control/monitoring vs outpatient cares. Although her pain had increased during the visit to 9/10 in severity, she opted for outpatient cares and close follow-up with her PCP. This was reasonable given stable hemodynamic status and no evidence of complication on CT. We did review a small portion of CT was obscured by prior hip hardware.     Will start her on cipro + flagyl, clear diet, and advise PCP follow up on 5/5/23. Also started on diflucan given +yeast on wet prep, although not likely to be related to symptoms. If there is any significant worsening she understands to return to the ED setting. She was in agreement of this plan.       Medical Decision Making  The patient's presentation was of moderate complexity (an acute illness with  systemic symptoms).    The patient's evaluation involved:  review of external note(s) from 2 sources (see separate area of note for details)  ordering and/or review of 3+ test(s) in this encounter (see separate area of note for details)  review of 2 test result(s) ordered prior to this encounter (see separate area of note for details)    The patient's management necessitated high risk (a decision regarding hospitalization).        Discharge Medication List as of 5/3/2023 11:13 PM          Final diagnoses:   Yeast infection of the vagina   Diverticulitis of colon   Elevated C-reactive protein (CRP)       5/3/2023   [facility]     Arin Butt PA-C  05/04/23 0858

## 2023-05-08 ENCOUNTER — OFFICE VISIT (OUTPATIENT)
Dept: FAMILY MEDICINE | Facility: OTHER | Age: 48
End: 2023-05-08
Attending: FAMILY MEDICINE
Payer: COMMERCIAL

## 2023-05-08 VITALS
SYSTOLIC BLOOD PRESSURE: 136 MMHG | HEART RATE: 75 BPM | BODY MASS INDEX: 43.57 KG/M2 | TEMPERATURE: 98.4 F | OXYGEN SATURATION: 99 % | DIASTOLIC BLOOD PRESSURE: 80 MMHG | RESPIRATION RATE: 16 BRPM | WEIGHT: 286.56 LBS

## 2023-05-08 DIAGNOSIS — K57.32 DIVERTICULITIS OF COLON: Primary | ICD-10-CM

## 2023-05-08 DIAGNOSIS — K80.20 GALLSTONES: ICD-10-CM

## 2023-05-08 DIAGNOSIS — B37.31 YEAST VAGINITIS: ICD-10-CM

## 2023-05-08 PROBLEM — T84.52XA: Status: ACTIVE | Noted: 2022-01-06

## 2023-05-08 PROCEDURE — 99213 OFFICE O/P EST LOW 20 MIN: CPT | Performed by: FAMILY MEDICINE

## 2023-05-08 RX ORDER — ASPIRIN 81 MG
TABLET,CHEWABLE ORAL
COMMUNITY
Start: 2022-12-15 | End: 2023-05-25

## 2023-05-08 RX ORDER — LOSARTAN POTASSIUM 25 MG/1
1 TABLET ORAL DAILY
COMMUNITY
Start: 2023-04-25 | End: 2023-05-08

## 2023-05-08 RX ORDER — SACCHAROMYCES BOULARDII 250 MG
CAPSULE ORAL
COMMUNITY
Start: 2022-12-15 | End: 2023-05-08

## 2023-05-08 RX ORDER — ONDANSETRON 4 MG/1
TABLET, FILM COATED ORAL
COMMUNITY
Start: 2022-12-19 | End: 2023-05-25

## 2023-05-08 RX ORDER — CYCLOBENZAPRINE HCL 10 MG
10 TABLET ORAL
COMMUNITY
Start: 2022-12-15 | End: 2023-05-08

## 2023-05-08 RX ORDER — RIFAMPIN 300 MG/1
CAPSULE ORAL
COMMUNITY
Start: 2022-12-15 | End: 2023-05-08

## 2023-05-08 RX ORDER — PSEUDOEPHED/ACETAMINOPH/DIPHEN 30MG-500MG
TABLET ORAL
COMMUNITY
Start: 2022-12-15

## 2023-05-08 RX ORDER — NAPROXEN 250 MG/1
250 TABLET ORAL
COMMUNITY
Start: 2022-12-29 | End: 2023-05-25

## 2023-05-08 RX ORDER — TRAMADOL HYDROCHLORIDE 50 MG/1
TABLET ORAL
COMMUNITY
Start: 2022-12-08 | End: 2023-05-08

## 2023-05-08 ASSESSMENT — PATIENT HEALTH QUESTIONNAIRE - PHQ9: SUM OF ALL RESPONSES TO PHQ QUESTIONS 1-9: 0

## 2023-05-08 ASSESSMENT — PAIN SCALES - GENERAL: PAINLEVEL: NO PAIN (0)

## 2023-05-08 NOTE — PROGRESS NOTES
"  Assessment & Plan     Diverticulitis of colon  First episode.  Known pan diverticulosis on colonoscopy 12/2022.  Symptoms improving.  Slowly advancing diet.  Completing antibiotic course.  Then will start daily fiber supplement.  Reassess if symptoms flare.  Education materials provided.    Yeast vaginitis  Completed Diflucan.    Gallstones  Noted on CT.    Asymptomatic.  Discussed symptoms to monitor - discussed biliary colic vs acute cholecystitis.   Consider consult with general surgeon.  Education materials provided.       BMI:   Estimated body mass index is 43.57 kg/m  as calculated from the following:    Height as of 12/29/22: 1.727 m (5' 8\").    Weight as of this encounter: 130 kg (286 lb 9 oz).   Weight management plan: Discussed healthy diet and exercise guidelines    See Patient Instructions    No follow-ups on file.    Aaliyah Sethi MD  [facility] - KINA Hurd is a 47 year old, presenting for the following health issues:  Emergency Room Follow Up    HPI     ED/UC Followup:    Facility:  [facility]  Date of visit: 5/3/23   Reason for visit: abdominal pain, urinary symptoms, low grade fever.    Elevated WBC and CRP.  Yeast on wet prep.  CT - sigmoid diverticulitis.  This was first episode.    Treated with Cipro, Flagyl, Diflucan.  Oxycodone for pain.    Had colonoscopy 12/2022 - pan diverticulosis; 2 polyps excised - hyperplastic.  Dad had partial colectomy.    Not on fiber supplement  Soft diet after 2 days of liquid diet.  Small bowels.    Current Status: Patient reports on the 3rd day the pain was gone, but \"pulsing\" feeling was still present in consuelo area. Patient reports that now all symptoms are mostly gone. Reports feeling \"super tired\"    ER 4/2/23 -  symptoms- treated with Cipro - culture negative.      Review of Systems   Constitutional, HEENT, cardiovascular, pulmonary, gi and gu systems are negative, except as otherwise noted.      Objective    BP " 136/80   Pulse 75   Temp 98.4  F (36.9  C)   Resp 16   Wt 130 kg (286 lb 9 oz)   SpO2 99%   BMI 43.57 kg/m    Body mass index is 43.57 kg/m .  Physical Exam   GENERAL: alert, no distress and obese  NECK: no adenopathy, no asymmetry, masses, or scars and thyroid normal to palpation  RESP: lungs clear to auscultation - no rales, rhonchi or wheezes  CV: regular rate and rhythm, normal S1 S2, no S3 or S4, no murmur, click or rub, no peripheral edema and peripheral pulses strong  ABDOMEN: tenderness mild - lower abdomen without guarding, no organomegaly or masses and bowel sounds normal  MS: no gross musculoskeletal defects noted, no edema  PSYCH: mentation appears normal, affect normal/bright

## 2023-05-23 NOTE — PROGRESS NOTES
Assessment & Plan     Cardiomyopathy, unspecified type (H)  Managed by cardiology.  EF normalized.  Weight management with GLP1 advised at last visit.  See below.  - Insulin level; Future  - Glucose; Future  - Hemoglobin A1c; Future  - semaglutide (OZEMPIC) 2 MG/3ML pen; Inject 0.25 mg Subcutaneous every 7 days  - TSH with free T4 reflex; Future  - Insulin level  - Glucose  - Hemoglobin A1c  - TSH with free T4 reflex    Morbid obesity (H)  BMI >40.   Has followed with weight management provider in the past.  Has been on Metformin for years.  Would like to try ozempic.  Will see if covered.  If not, will check on alternatives with insurance.  If none, may need to look into alternative options -saxenda, etc - coupons to assist?  - Insulin level; Future  - Glucose; Future  - Hemoglobin A1c; Future  - semaglutide (OZEMPIC) 2 MG/3ML pen; Inject 0.25 mg Subcutaneous every 7 days  - TSH with free T4 reflex; Future  - Insulin level  - Glucose  - Hemoglobin A1c  - TSH with free T4 reflex    Insulin resistance syndrome  As above.  Prior a1c normal, but insulin elevated.  Update baseline labs today prior to starting GLP1.  - Insulin level; Future  - Glucose; Future  - Hemoglobin A1c; Future  - semaglutide (OZEMPIC) 2 MG/3ML pen; Inject 0.25 mg Subcutaneous every 7 days  - TSH with free T4 reflex; Future  - Insulin level  - Glucose  - Hemoglobin A1c  - TSH with free T4 reflex    Dysfunction of left eustachian tube  Discussed antihistamine, decongestant, nasal steroid.  ENT referral for tube if not improving.               See Patient Instructions    Return in about 1 month (around 6/25/2023) for weight check.    Aaliyah Sethi MD  [facility] - KINA Hurd is a 47 year old, presenting for the following health issues:  Discuss Ozempic     HPI     Concern - Discuss Ozempic for weight loss  Onset: recommend by her cardiology at [facility]  Description: discuss ozempic   Accompanying Signs  "& Symptoms: wants medication for weightloss  Previous history of similar problem: was on Vyvnase but has been diagnosed with heart disease and since stopped taking medication  Precipitating factors:        Worsened by: none  Alleviating factors:        Improved by: none  Therapies tried and outcome: Vyvanse but stopped due to being diagnosed with heart disease.     Prediabetes.  Has been on Metformin for years.  Cardiomyopathy - idiopathic.  EF has recovered.  Follows with HF clinic.    Left ear plugged - x 1 month.  No other sinus symptoms.   No OTC allergy agents.  Pops with cough/sneeze.  Occasional ache.        Review of Systems   Constitutional, HEENT, cardiovascular, pulmonary, gi and gu systems are negative, except as otherwise noted.      Objective    /86 (BP Location: Right arm, Patient Position: Sitting, Cuff Size: Adult Large)   Pulse 80   Temp 98.5  F (36.9  C) (Tympanic)   Ht 1.727 m (5' 8\")   Wt 130.5 kg (287 lb 12.8 oz)   SpO2 99%   BMI 43.76 kg/m    Body mass index is 43.76 kg/m .  Physical Exam   GENERAL: healthy, alert, no distress and over weight  EYES: Eyes grossly normal to inspection, PERRL and conjunctivae and sclerae normal  HENT: normal cephalic/atraumatic, right ear: normal: no effusions, no erythema, normal landmarks, left ear: yellow effusion, nose and mouth without ulcers or lesions, oropharynx clear and oral mucous membranes moist  NECK: no adenopathy, no asymmetry, masses, or scars and thyroid normal to palpation  RESP: lungs clear to auscultation - no rales, rhonchi or wheezes  CV: regular rate and rhythm, normal S1 S2, no S3 or S4, no murmur, click or rub, no peripheral edema and peripheral pulses strong  ABDOMEN: soft, nontender, no hepatosplenomegaly, no masses and bowel sounds normal  MS: no gross musculoskeletal defects noted, no edema  PSYCH: mentation appears normal, affect normal/bright    No results found for this or any previous visit (from the past 24 " hour(s)).  Labs pending

## 2023-05-25 ENCOUNTER — TELEPHONE (OUTPATIENT)
Dept: FAMILY MEDICINE | Facility: OTHER | Age: 48
End: 2023-05-25

## 2023-05-25 ENCOUNTER — OFFICE VISIT (OUTPATIENT)
Dept: FAMILY MEDICINE | Facility: OTHER | Age: 48
End: 2023-05-25
Attending: FAMILY MEDICINE
Payer: COMMERCIAL

## 2023-05-25 VITALS
DIASTOLIC BLOOD PRESSURE: 86 MMHG | HEART RATE: 80 BPM | HEIGHT: 68 IN | BODY MASS INDEX: 43.62 KG/M2 | WEIGHT: 287.8 LBS | SYSTOLIC BLOOD PRESSURE: 132 MMHG | OXYGEN SATURATION: 99 % | TEMPERATURE: 98.5 F

## 2023-05-25 DIAGNOSIS — I42.9 CARDIOMYOPATHY, UNSPECIFIED TYPE (H): Primary | ICD-10-CM

## 2023-05-25 DIAGNOSIS — H69.92 DYSFUNCTION OF LEFT EUSTACHIAN TUBE: ICD-10-CM

## 2023-05-25 DIAGNOSIS — E88.810 INSULIN RESISTANCE SYNDROME: ICD-10-CM

## 2023-05-25 DIAGNOSIS — E66.01 MORBID OBESITY (H): ICD-10-CM

## 2023-05-25 LAB
EST. AVERAGE GLUCOSE BLD GHB EST-MCNC: 108 MG/DL
FASTING STATUS PATIENT QL REPORTED: YES
GLUCOSE SERPL-MCNC: 98 MG/DL (ref 70–99)
HBA1C MFR BLD: 5.4 %
INSULIN SERPL-ACNC: 14 UU/ML (ref 2.6–24.9)
TSH SERPL DL<=0.005 MIU/L-ACNC: 2.02 UIU/ML (ref 0.3–4.2)

## 2023-05-25 PROCEDURE — 84443 ASSAY THYROID STIM HORMONE: CPT | Performed by: FAMILY MEDICINE

## 2023-05-25 PROCEDURE — 83036 HEMOGLOBIN GLYCOSYLATED A1C: CPT | Performed by: FAMILY MEDICINE

## 2023-05-25 PROCEDURE — 83525 ASSAY OF INSULIN: CPT | Performed by: FAMILY MEDICINE

## 2023-05-25 PROCEDURE — 36415 COLL VENOUS BLD VENIPUNCTURE: CPT | Performed by: FAMILY MEDICINE

## 2023-05-25 PROCEDURE — 82947 ASSAY GLUCOSE BLOOD QUANT: CPT | Performed by: FAMILY MEDICINE

## 2023-05-25 PROCEDURE — 99213 OFFICE O/P EST LOW 20 MIN: CPT | Performed by: FAMILY MEDICINE

## 2023-05-25 ASSESSMENT — PAIN SCALES - GENERAL: PAINLEVEL: NO PAIN (0)

## 2023-05-25 NOTE — TELEPHONE ENCOUNTER
Received a PA request from [employer] for semaglutide (OZEMPIC) 2 MG/3ML pen. Submitted on CMM. Waiting for a response.

## 2023-05-25 NOTE — PATIENT INSTRUCTIONS
Will notify of lab results.  If Ozempic not covered - check with insurance - what is covered for weight management?  Any GLP1 agents (same class as Ozempic)?  If not, what is cost out of pocket?  Are there coupons/programs that may be able to assist?

## 2023-05-31 ENCOUNTER — MYC MEDICAL ADVICE (OUTPATIENT)
Dept: FAMILY MEDICINE | Facility: OTHER | Age: 48
End: 2023-05-31

## 2023-05-31 DIAGNOSIS — E66.01 MORBID OBESITY (H): Primary | ICD-10-CM

## 2023-05-31 DIAGNOSIS — E88.810 INSULIN RESISTANCE SYNDROME: ICD-10-CM

## 2023-06-06 NOTE — TELEPHONE ENCOUNTER
Received a DENIAL from [employer] for semaglutide (OZEMPIC) 2 MG/3ML pen.        Scanned in Epic.

## 2023-06-21 ENCOUNTER — TELEPHONE (OUTPATIENT)
Dept: FAMILY MEDICINE | Facility: OTHER | Age: 48
End: 2023-06-21

## 2023-06-21 NOTE — TELEPHONE ENCOUNTER
Received an APPROVAL from [employer] for Semaglutide-Weight Management (WEGOVY) 0.25 MG/0.5ML pen. Effective dates 5/22/2023-1/17/2024.

## 2023-06-21 NOTE — TELEPHONE ENCOUNTER
Received a PA request from [employer] for Semaglutide-Weight Management (WEGOVY) 0.25 MG/0.5ML pen. Submitted on CMM. Waiting for a response.

## 2023-08-07 DIAGNOSIS — N92.0 MENORRHAGIA WITH REGULAR CYCLE: ICD-10-CM

## 2023-08-07 DIAGNOSIS — I10 BENIGN ESSENTIAL HYPERTENSION: ICD-10-CM

## 2023-08-07 DIAGNOSIS — E87.6 HYPOKALEMIA: ICD-10-CM

## 2023-08-07 DIAGNOSIS — E83.42 HYPOMAGNESEMIA: ICD-10-CM

## 2023-08-09 RX ORDER — LANOLIN ALCOHOL/MO/W.PET/CERES
400 CREAM (GRAM) TOPICAL DAILY
Qty: 90 TABLET | Refills: 3 | Status: SHIPPED | OUTPATIENT
Start: 2023-08-09 | End: 2023-10-03

## 2023-08-09 RX ORDER — MEDROXYPROGESTERONE ACETATE 10 MG
10 TABLET ORAL DAILY
Qty: 90 TABLET | Refills: 3 | Status: SHIPPED | OUTPATIENT
Start: 2023-08-09 | End: 2024-08-05

## 2023-08-09 RX ORDER — LOSARTAN POTASSIUM 25 MG/1
25 TABLET ORAL DAILY
Qty: 90 TABLET | Refills: 1 | Status: SHIPPED | OUTPATIENT
Start: 2023-08-09 | End: 2024-02-05

## 2023-08-09 RX ORDER — POTASSIUM CHLORIDE 750 MG/1
10 TABLET, EXTENDED RELEASE ORAL DAILY
Qty: 90 TABLET | Refills: 1 | Status: SHIPPED | OUTPATIENT
Start: 2023-08-09 | End: 2024-02-05

## 2023-08-09 RX ORDER — METOPROLOL SUCCINATE 25 MG/1
25 TABLET, EXTENDED RELEASE ORAL DAILY
Qty: 90 TABLET | Refills: 1 | Status: SHIPPED | OUTPATIENT
Start: 2023-08-09 | End: 2024-02-05

## 2023-08-09 NOTE — TELEPHONE ENCOUNTER
Cozaar      Last Written Prescription Date:  2/13/2023  Last Fill Quantity: 90,   # refills: 1  Last Office Visit: 5/25/2023  Future Office visit:       Magnesium      Last Written Prescription Date:  2/13/2023  Last Fill Quantity: 90,   # refills: 1  Last Office Visit: 5/25/2023  Future Office visit:       Provera      Last Written Prescription Date:  2/13/2023  Last Fill Quantity: 90,   # refills: 1  Last Office Visit: 5/25/2023  Future Office visit:       Metoprolol      Last Written Prescription Date:  3/02/2023  Last Fill Quantity: 90,   # refills: 1  Last Office Visit: 5/25/2023  Future Office visit:         Potassium      Last Written Prescription Date:  3/02/2023  Last Fill Quantity: 90,   # refills: 1  Last Office Visit: 5/25/2023  Future Office visit:

## 2023-09-12 ENCOUNTER — MYC MEDICAL ADVICE (OUTPATIENT)
Dept: FAMILY MEDICINE | Facility: OTHER | Age: 48
End: 2023-09-12

## 2023-09-29 ENCOUNTER — MYC MEDICAL ADVICE (OUTPATIENT)
Dept: FAMILY MEDICINE | Facility: OTHER | Age: 48
End: 2023-09-29

## 2023-09-29 DIAGNOSIS — F33.1 MODERATE EPISODE OF RECURRENT MAJOR DEPRESSIVE DISORDER (H): ICD-10-CM

## 2023-09-29 DIAGNOSIS — E66.01 MORBID OBESITY (H): ICD-10-CM

## 2023-09-29 DIAGNOSIS — E83.42 HYPOMAGNESEMIA: ICD-10-CM

## 2023-09-30 ENCOUNTER — HEALTH MAINTENANCE LETTER (OUTPATIENT)
Age: 48
End: 2023-09-30

## 2023-10-03 RX ORDER — MAGNESIUM OXIDE 400 MG/1
400 TABLET ORAL DAILY
Qty: 90 TABLET | Refills: 3 | Status: SHIPPED | OUTPATIENT
Start: 2023-10-03 | End: 2024-03-14

## 2023-10-03 RX ORDER — SERTRALINE HYDROCHLORIDE 100 MG/1
TABLET, FILM COATED ORAL
Qty: 135 TABLET | Refills: 3 | Status: SHIPPED | OUTPATIENT
Start: 2023-10-03

## 2023-10-03 RX ORDER — METFORMIN HCL 500 MG
1000 TABLET, EXTENDED RELEASE 24 HR ORAL 2 TIMES DAILY WITH MEALS
Qty: 120 TABLET | Refills: 3 | Status: SHIPPED | OUTPATIENT
Start: 2023-10-03 | End: 2024-01-29

## 2023-10-03 NOTE — TELEPHONE ENCOUNTER
Magnesium oxide      Last Written Prescription Date:  8/9/23  Last Fill Quantity: 90,   # refills: 3  Last Office Visit: 5/25/23  Future Office visit:       Metformin      Last Written Prescription Date:  3/2/23  Last Fill Quantity: 120,   # refills: 0  Last Office Visit: 5/25/23  Future Office visit:       Sertraline      Last Written Prescription Date:  3/2/23  Last Fill Quantity: 135,   # refills: 3  Last Office Visit: 5/25/23  Future Office visit:       Issue with [facility] at this time.

## 2023-10-11 ENCOUNTER — MYC MEDICAL ADVICE (OUTPATIENT)
Dept: FAMILY MEDICINE | Facility: OTHER | Age: 48
End: 2023-10-11

## 2023-10-11 DIAGNOSIS — E88.810 INSULIN RESISTANCE SYNDROME: ICD-10-CM

## 2023-10-11 DIAGNOSIS — E66.01 MORBID OBESITY (H): ICD-10-CM

## 2023-10-11 DIAGNOSIS — I42.9 CARDIOMYOPATHY, UNSPECIFIED TYPE (H): ICD-10-CM

## 2023-10-11 DIAGNOSIS — Z76.89 ENCOUNTER FOR WEIGHT MANAGEMENT: Primary | ICD-10-CM

## 2023-10-12 RX ORDER — SEMAGLUTIDE 0.68 MG/ML
INJECTION, SOLUTION SUBCUTANEOUS
Qty: 3 ML | Refills: 0 | OUTPATIENT
Start: 2023-10-12

## 2023-10-12 NOTE — TELEPHONE ENCOUNTER
Semaglutide (Ozempic) 2 MG/3ML pen    Last Written Prescription Date:  05/25/2023  Last Fill Quantity: 3mL,   # refills: 0  Last Office Visit: 05/25/2023

## 2023-11-14 ENCOUNTER — MYC REFILL (OUTPATIENT)
Dept: FAMILY MEDICINE | Facility: OTHER | Age: 48
End: 2023-11-14

## 2023-11-14 DIAGNOSIS — Z76.89 ENCOUNTER FOR WEIGHT MANAGEMENT: ICD-10-CM

## 2023-11-15 NOTE — TELEPHONE ENCOUNTER
Wegovy      Last Written Prescription Date:  10.11.23  Last Fill Quantity: #2mL,   # refills: 0  Last Office Visit: 5.25.23  Future Office visit:       Routing refill request to provider for review/approval because:  Drug not on the [facility], P or [facility] refill protocol or controlled substance

## 2023-11-16 ENCOUNTER — TELEPHONE (OUTPATIENT)
Dept: FAMILY MEDICINE | Facility: OTHER | Age: 48
End: 2023-11-16

## 2023-11-16 NOTE — TELEPHONE ENCOUNTER
Received a PA request from [facility] for Semaglutide, 1 MG/DOSE, (OZEMPIC) 4 MG/3ML pen. Submitted on CMM. Waiting for a response.

## 2023-11-16 NOTE — TELEPHONE ENCOUNTER
Patient states she has been staying at the 0.5, but would like to increase. Has not had any side effects.     Ant SEQUEIRA RN, Care Coordinator  [facility]

## 2023-11-17 ENCOUNTER — MYC MEDICAL ADVICE (OUTPATIENT)
Dept: FAMILY MEDICINE | Facility: OTHER | Age: 48
End: 2023-11-17

## 2023-11-17 NOTE — TELEPHONE ENCOUNTER
Per pharmacy pt received her first dose as a sample.  Pt will need a PA for either dose.  PA submitted for the 1.0 mg dose.  Pt called and informed and per pt she will follow up on Monday as that is when she is due for her next injections.

## 2023-11-20 NOTE — TELEPHONE ENCOUNTER
Received a DENIAL from [facility] for  Semaglutide, 1 MG/DOSE, (OZEMPIC) 4 MG/3ML pen.             Scanned in Epic.

## 2023-11-30 ENCOUNTER — MYC MEDICAL ADVICE (OUTPATIENT)
Dept: FAMILY MEDICINE | Facility: OTHER | Age: 48
End: 2023-11-30

## 2023-11-30 DIAGNOSIS — Z76.89 ENCOUNTER FOR WEIGHT MANAGEMENT: Primary | ICD-10-CM

## 2023-11-30 DIAGNOSIS — Z76.89 ENCOUNTER FOR WEIGHT MANAGEMENT: ICD-10-CM

## 2023-11-30 NOTE — TELEPHONE ENCOUNTER
JEAN MARIN      Last Written Prescription Date:  11-30-23  Last Fill Quantity: 2ML,   # refills: 1  Last Office Visit: 5-25-23  Future Office visit:       Routing refill request to provider for review/approval because:  Drug FILLED TODAY

## 2023-11-30 NOTE — TELEPHONE ENCOUNTER
Pt called and stated that her insurance does not cover the Ozempic and needs to have the Wegovy sent in to the pharmacy.  Pt stated that she is at the 1.0 mg.  Pt reports tolerating the 0.5 mg well.    Pended Wegovy 1.0 mg.  Please review and sign if appropriate.

## 2023-12-01 RX ORDER — SEMAGLUTIDE 0.5 MG/.5ML
INJECTION, SOLUTION SUBCUTANEOUS
Qty: 2 ML | Refills: 0 | OUTPATIENT
Start: 2023-12-01

## 2024-02-04 DIAGNOSIS — I10 BENIGN ESSENTIAL HYPERTENSION: ICD-10-CM

## 2024-02-04 DIAGNOSIS — E87.6 HYPOKALEMIA: ICD-10-CM

## 2024-02-05 RX ORDER — LOSARTAN POTASSIUM 25 MG/1
25 TABLET ORAL DAILY
Qty: 90 TABLET | Refills: 0 | Status: SHIPPED | OUTPATIENT
Start: 2024-02-05 | End: 2024-05-06

## 2024-02-05 RX ORDER — METOPROLOL SUCCINATE 25 MG/1
25 TABLET, EXTENDED RELEASE ORAL DAILY
Qty: 90 TABLET | Refills: 0 | Status: SHIPPED | OUTPATIENT
Start: 2024-02-05 | End: 2024-05-06

## 2024-02-05 RX ORDER — POTASSIUM CHLORIDE 750 MG/1
10 TABLET, EXTENDED RELEASE ORAL DAILY
Qty: 90 TABLET | Refills: 0 | Status: SHIPPED | OUTPATIENT
Start: 2024-02-05 | End: 2024-05-06

## 2024-02-12 ENCOUNTER — TELEPHONE (OUTPATIENT)
Dept: FAMILY MEDICINE | Facility: OTHER | Age: 49
End: 2024-02-12

## 2024-02-12 NOTE — TELEPHONE ENCOUNTER
Received a PA request from [facility] for Semaglutide-Weight Management (WEGOVY) 1 MG/0.5ML pen. Submitted on CMM and got an APPROVAL. Effective dates 1/13/24-9/9/24. Case ID: [id number]

## 2024-02-19 NOTE — PROGRESS NOTES
"  Assessment & Plan     Benign essential hypertension  Stable.  Continue Toprol XL and Losartan.  Update labs.  - Comprehensive metabolic panel (BMP + Alb, Alk Phos, ALT, AST, Total. Bili, TP); Future  - Lipid Profile (Chol, Trig, HDL, LDL calc); Future    Insulin resistance syndrome  Morbid obesity (H)  Ongoing efforts with diet, exercise - counseled today.  Needs consistency.    Recent addition of Wegovy - tolerating.  Titrate up.  If on maintenance dose, may then titrate off the Metformin.  - TSH with free T4 reflex; Future  - Vitamin D Deficiency; Future    Vitamin D deficiency  Update lab  - Vitamin D Deficiency; Future    Snoring  Prior remote sleep study - needs to be updated.  Prefers here vs [place] - cardiology was going to set up there.  Referral for sleep study  - Adult Sleep Eval & Management  Referral; Future    TROY (obstructive sleep apnea)  As above  - Adult Sleep Eval & Management  Referral; Future    Fatigue, unspecified type  Update labs.  Encouraged exercise  - TSH with free T4 reflex; Future  - Vitamin D Deficiency; Future    Chronic otitis media of left ear with effusion  Months - left.   Odd shape - central?  Add nasal steroid spray to antihistamine.  Referral to ENT.  - Adult ENT  Referral; Future  - fluticasone (FLONASE) 50 MCG/ACT nasal spray; Spray 1 spray into both nostrils daily    Encounter for weight management  As above.  [place], 5% loss sustained.      30 minutes spent by me on the date of the encounter doing chart review, history and exam, documentation and further activities per the note      BMI  Estimated body mass index is 45.58 kg/m  as calculated from the following:    Height as of 5/25/23: 1.727 m (5' 8\").    Weight as of this encounter: 136 kg (299 lb 12.8 oz).   Weight management plan: Discussed healthy diet and exercise guidelines      See Patient Instructions    No follow-ups on file.    Chilo Hurd is a 48 year old, presenting for the " following health issues:  Recheck Medication        2/21/2024     2:39 PM   Additional Questions   Roomed by Valeriy Laguerre   Accompanied by None         2/21/2024     2:39 PM   Patient Reported Additional Medications   Patient reports taking the following new medications None     HPI     Vaccines - flu, covid, hep B#2 - agreeable      Wegovy -   Lag in getting it - then had 2 weeks - then out - now restarted it and on week 5.  Some nausea, sulfur burps.  No vomiting.  Helpful with curbing diet.   - diagnosed with diabetes.   Reduced sugars - sweets, soda, ice cream.  3 meals per day.  Limiting carbs.  Starting weight - 310.  Today 299.  Limited exercise.  Always tired.    Has follow up with cardiology - 3/2024 - [facility]  Prior cardiomyopathy.    Provera continuously.   Now gets a 1 day cycle.    Left ear - months - plugged/popping.  Whooshing.    No fever or illness.      Hypertension Follow-up  Do you check your blood pressure regularly outside of the clinic? No   Are you following a low salt diet? No  Are your blood pressures ever more than 140 on the top number (systolic) OR more   than 90 on the bottom number (diastolic), for example 140/90? Patient is not taking BP outside of the clinic   TROY - no CPAP - needs sleep study  Snoring.  Witnessed apnea.  Remote sleep apnea - abnormal - insurance didn't cover.    Depression and Anxiety Follow-Up - zoloft 150 mg  How are you doing with your depression since your last visit? No change  How are you doing with your anxiety since your last visit?  Improved   Are you having other symptoms that might be associated with depression or anxiety? Yes:  More fatigued   Have you had a significant life event? No   Do you have any concerns with your use of alcohol or other drugs? No    Social History     Tobacco Use    Smoking status: Former     Packs/day: 0.00     Years: 1.00     Additional pack years: 0.00     Total pack years: 0.00     Types: Cigarettes     Quit date:  2004     Years since quittin.2     Passive exposure: Past    Smokeless tobacco: Never    Tobacco comments:     Tried to quit   Vaping Use    Vaping Use: Never used   Substance Use Topics    Alcohol use: Yes     Comment: hasnt drank in a year    Drug use: No         2022     9:32 AM 2023     8:59 AM 2024     4:24 PM   PHQ   PHQ-9 Total Score 8 0 8   Q9: Thoughts of better off dead/self-harm past 2 weeks Not at all Not at all Not at all         2022     4:07 PM 2022     9:00 AM 2022     9:36 AM   NIRAJ-7 SCORE   Total Score   7 (mild anxiety)   Total Score 9 0 7         2024     4:24 PM   Last PHQ-9   1.  Little interest or pleasure in doing things 1   2.  Feeling down, depressed, or hopeless 0   3.  Trouble falling or staying asleep, or sleeping too much 3   4.  Feeling tired or having little energy 3   5.  Poor appetite or overeating 1   6.  Feeling bad about yourself 0   7.  Trouble concentrating 0   8.  Moving slowly or restless 0   Q9: Thoughts of better off dead/self-harm past 2 weeks 0   PHQ-9 Total Score 8       Suicide Assessment Five-step Evaluation and Treatment (SAFE-T)    Pain History:  When did you first notice your pain? Chronic    Have you seen this provider for your pain in the past? Yes   Where in your body do you have pain? Hips and knees   Are you seeing anyone else for your pain? No        2022     9:32 AM 2023     8:59 AM 2024     4:24 PM   PHQ-9 SCORE   PHQ-9 Total Score MyChart 8 (Mild depression)  8 (Mild depression)   PHQ-9 Total Score 8 0 8       Location of pain: Hips and knees   Analgesia/pain control:    - Recent changes:  Pain is improved     - Overall control: Tolerable with discomfort    - Current treatments: Ibuprofen PRN and trying to loose weight    Adherence:     - Do you ever take more pain medicine than prescribed? No    - When did you take your last dose of pain medicine?  N/A   Adverse effects: No   PDMP Review          Value Time User    State PDMP site checked  Yes 2/21/2024  3:15 PM Aaliyah Suarez MD          Last CSA Agreement:   CSA -- Patient Level:     [Media Unavailable] Controlled Substance Agreement - Non - Opioid - Scan on 12/6/2021  2:41 PM: NON-OPIOID CONTROLLED SUBSTANCE AGREEMENT       Last UDS: 12/2/2021      How many servings of fruits and vegetables do you eat daily?  2-3  On average, how many sweetened beverages do you drink each day (Examples: soda, juice, sweet tea, etc.  Do NOT count diet or artificially sweetened beverages)?   0  How many days per week do you exercise enough to make your heart beat faster? 3 or less  How many minutes a day do you exercise enough to make your heart beat faster? 9 or less  How many days per week do you miss taking your medication? 2  What makes it hard for you to take your medications?  remembering to take        Review of Systems  Constitutional, HEENT, cardiovascular, pulmonary, gi and gu systems are negative, except as otherwise noted.      Objective    /87 (BP Location: Left arm, Patient Position: Sitting, Cuff Size: Adult Large)   Pulse 69   Temp 97.8  F (36.6  C) (Tympanic)   Resp 16   Wt 136 kg (299 lb 12.8 oz)   SpO2 97%   BMI 45.58 kg/m    Body mass index is 45.58 kg/m .  Physical Exam   GENERAL: alert, no distress, and obese  EYES: Eyes grossly normal to inspection, PERRL and conjunctivae and sclerae normal  HENT: normal cephalic/atraumatic, right ear: normal: no effusions, no erythema, normal landmarks, left ear: yellow/thick effusion - but not a meniscus - central location, nose and mouth without ulcers or lesions, oropharynx clear, and oral mucous membranes moist  NECK: no adenopathy, no asymmetry, masses, or scars  RESP: lungs clear to auscultation - no rales, rhonchi or wheezes  CV: regular rate and rhythm, normal S1 S2, no S3 or S4, no murmur, click or rub, no peripheral edema  ABDOMEN: soft, nontender, no hepatosplenomegaly, no masses and bowel  sounds normal  MS: no gross musculoskeletal defects noted, no edema  NEURO: Normal strength and tone, mentation intact and speech normal  PSYCH: mentation appears normal, affect normal/bright    Labs pending        Signed Electronically by: Aaliyah Sethi MD    Answers submitted by the patient for this visit:  Patient Health Questionnaire (Submitted on 2/20/2024)  If you checked off any problems, how difficult have these problems made it for you to do your work, take care of things at home, or get along with other people?: Somewhat difficult  PHQ9 TOTAL SCORE: 8

## 2024-02-20 ASSESSMENT — PATIENT HEALTH QUESTIONNAIRE - PHQ9
SUM OF ALL RESPONSES TO PHQ QUESTIONS 1-9: 8
10. IF YOU CHECKED OFF ANY PROBLEMS, HOW DIFFICULT HAVE THESE PROBLEMS MADE IT FOR YOU TO DO YOUR WORK, TAKE CARE OF THINGS AT HOME, OR GET ALONG WITH OTHER PEOPLE: SOMEWHAT DIFFICULT
SUM OF ALL RESPONSES TO PHQ QUESTIONS 1-9: 8

## 2024-02-21 ENCOUNTER — OFFICE VISIT (OUTPATIENT)
Dept: FAMILY MEDICINE | Facility: OTHER | Age: 49
End: 2024-02-21
Attending: FAMILY MEDICINE
Payer: COMMERCIAL

## 2024-02-21 VITALS
RESPIRATION RATE: 16 BRPM | SYSTOLIC BLOOD PRESSURE: 128 MMHG | OXYGEN SATURATION: 97 % | HEART RATE: 69 BPM | TEMPERATURE: 97.8 F | DIASTOLIC BLOOD PRESSURE: 87 MMHG | BODY MASS INDEX: 45.58 KG/M2 | WEIGHT: 293 LBS

## 2024-02-21 DIAGNOSIS — E88.810 INSULIN RESISTANCE SYNDROME: ICD-10-CM

## 2024-02-21 DIAGNOSIS — E66.01 MORBID OBESITY (H): ICD-10-CM

## 2024-02-21 DIAGNOSIS — R06.83 SNORING: ICD-10-CM

## 2024-02-21 DIAGNOSIS — I10 BENIGN ESSENTIAL HYPERTENSION: Primary | ICD-10-CM

## 2024-02-21 DIAGNOSIS — G47.33 OSA (OBSTRUCTIVE SLEEP APNEA): ICD-10-CM

## 2024-02-21 DIAGNOSIS — H65.492 CHRONIC OTITIS MEDIA OF LEFT EAR WITH EFFUSION: ICD-10-CM

## 2024-02-21 DIAGNOSIS — Z76.89 ENCOUNTER FOR WEIGHT MANAGEMENT: ICD-10-CM

## 2024-02-21 DIAGNOSIS — E55.9 VITAMIN D DEFICIENCY: ICD-10-CM

## 2024-02-21 DIAGNOSIS — R53.83 FATIGUE, UNSPECIFIED TYPE: ICD-10-CM

## 2024-02-21 LAB
ALBUMIN SERPL BCG-MCNC: 4.1 G/DL (ref 3.5–5.2)
ALP SERPL-CCNC: 57 U/L (ref 40–150)
ALT SERPL W P-5'-P-CCNC: 20 U/L (ref 0–50)
ANION GAP SERPL CALCULATED.3IONS-SCNC: 12 MMOL/L (ref 7–15)
AST SERPL W P-5'-P-CCNC: 18 U/L (ref 0–45)
BILIRUB SERPL-MCNC: 0.2 MG/DL
BUN SERPL-MCNC: 6.3 MG/DL (ref 6–20)
CALCIUM SERPL-MCNC: 9.3 MG/DL (ref 8.6–10)
CHLORIDE SERPL-SCNC: 105 MMOL/L (ref 98–107)
CHOLEST SERPL-MCNC: 169 MG/DL
CREAT SERPL-MCNC: 0.71 MG/DL (ref 0.51–0.95)
DEPRECATED HCO3 PLAS-SCNC: 25 MMOL/L (ref 22–29)
EGFRCR SERPLBLD CKD-EPI 2021: >90 ML/MIN/1.73M2
FASTING STATUS PATIENT QL REPORTED: NO
GLUCOSE SERPL-MCNC: 89 MG/DL (ref 70–99)
HDLC SERPL-MCNC: 36 MG/DL
LDLC SERPL CALC-MCNC: 105 MG/DL
NONHDLC SERPL-MCNC: 133 MG/DL
POTASSIUM SERPL-SCNC: 4.1 MMOL/L (ref 3.4–5.3)
PROT SERPL-MCNC: 7.1 G/DL (ref 6.4–8.3)
SODIUM SERPL-SCNC: 142 MMOL/L (ref 135–145)
TRIGL SERPL-MCNC: 139 MG/DL
TSH SERPL DL<=0.005 MIU/L-ACNC: 1.06 UIU/ML (ref 0.3–4.2)

## 2024-02-21 PROCEDURE — 91320 SARSCV2 VAC 30MCG TRS-SUC IM: CPT | Performed by: FAMILY MEDICINE

## 2024-02-21 PROCEDURE — 80061 LIPID PANEL: CPT | Performed by: FAMILY MEDICINE

## 2024-02-21 PROCEDURE — 90686 IIV4 VACC NO PRSV 0.5 ML IM: CPT | Performed by: FAMILY MEDICINE

## 2024-02-21 PROCEDURE — 36415 COLL VENOUS BLD VENIPUNCTURE: CPT | Performed by: FAMILY MEDICINE

## 2024-02-21 PROCEDURE — 82306 VITAMIN D 25 HYDROXY: CPT | Performed by: FAMILY MEDICINE

## 2024-02-21 PROCEDURE — 90472 IMMUNIZATION ADMIN EACH ADD: CPT | Performed by: FAMILY MEDICINE

## 2024-02-21 PROCEDURE — 84443 ASSAY THYROID STIM HORMONE: CPT | Performed by: FAMILY MEDICINE

## 2024-02-21 PROCEDURE — 90471 IMMUNIZATION ADMIN: CPT | Performed by: FAMILY MEDICINE

## 2024-02-21 PROCEDURE — 90480 ADMN SARSCOV2 VAC 1/ONLY CMP: CPT | Performed by: FAMILY MEDICINE

## 2024-02-21 PROCEDURE — 99214 OFFICE O/P EST MOD 30 MIN: CPT | Mod: 25 | Performed by: FAMILY MEDICINE

## 2024-02-21 PROCEDURE — 90746 HEPB VACCINE 3 DOSE ADULT IM: CPT | Performed by: FAMILY MEDICINE

## 2024-02-21 PROCEDURE — 80053 COMPREHEN METABOLIC PANEL: CPT | Performed by: FAMILY MEDICINE

## 2024-02-21 RX ORDER — FLUTICASONE PROPIONATE 50 MCG
1 SPRAY, SUSPENSION (ML) NASAL DAILY
Qty: 16 G | Refills: 1 | Status: SHIPPED | OUTPATIENT
Start: 2024-02-21

## 2024-02-21 ASSESSMENT — PAIN SCALES - GENERAL: PAINLEVEL: NO PAIN (0)

## 2024-02-21 NOTE — PATIENT INSTRUCTIONS
Referrals -   ENT   Sleep study    Labs today - will notify of results.    Continue current medications.    Hep B vaccine, flu, covid.

## 2024-02-22 LAB — VIT D+METAB SERPL-MCNC: 36 NG/ML (ref 20–50)

## 2024-02-23 ENCOUNTER — MYC MEDICAL ADVICE (OUTPATIENT)
Dept: PULMONOLOGY | Facility: OTHER | Age: 49
End: 2024-02-23

## 2024-03-13 DIAGNOSIS — Z76.89 ENCOUNTER FOR WEIGHT MANAGEMENT: ICD-10-CM

## 2024-03-13 RX ORDER — SEMAGLUTIDE 1 MG/.5ML
INJECTION, SOLUTION SUBCUTANEOUS
Qty: 4 ML | Refills: 0 | Status: SHIPPED | OUTPATIENT
Start: 2024-03-13

## 2024-03-13 NOTE — TELEPHONE ENCOUNTER
Wegovy 1 MG/0.5 ML      Last Written Prescription Date:  11/30/23  Last Fill Quantity: 2ml,   # refills: 1  Last Office Visit: 02/21/24  Future Office visit:       Routing refill request to provider for review/approval because:  Drug not on the [facility], [facility] or [facility] refill protocol or controlled substance

## 2024-03-14 ENCOUNTER — MYC REFILL (OUTPATIENT)
Dept: FAMILY MEDICINE | Facility: OTHER | Age: 49
End: 2024-03-14

## 2024-03-14 DIAGNOSIS — E83.42 HYPOMAGNESEMIA: ICD-10-CM

## 2024-03-14 DIAGNOSIS — E55.9 VITAMIN D DEFICIENCY: Primary | ICD-10-CM

## 2024-03-14 DIAGNOSIS — E61.1 IRON DEFICIENCY: ICD-10-CM

## 2024-03-15 RX ORDER — MAGNESIUM OXIDE 400 MG/1
400 TABLET ORAL DAILY
Qty: 90 TABLET | Refills: 3 | Status: SHIPPED | OUTPATIENT
Start: 2024-03-15

## 2024-03-15 ASSESSMENT — SLEEP AND FATIGUE QUESTIONNAIRES
HOW LIKELY ARE YOU TO NOD OFF OR FALL ASLEEP WHEN YOU ARE A PASSENGER IN A CAR FOR AN HOUR WITHOUT A BREAK: HIGH CHANCE OF DOZING
HOW LIKELY ARE YOU TO NOD OFF OR FALL ASLEEP WHILE SITTING AND READING: HIGH CHANCE OF DOZING
HOW LIKELY ARE YOU TO NOD OFF OR FALL ASLEEP WHILE SITTING AND TALKING TO SOMEONE: SLIGHT CHANCE OF DOZING
HOW LIKELY ARE YOU TO NOD OFF OR FALL ASLEEP WHILE SITTING QUIETLY AFTER LUNCH WITHOUT ALCOHOL: SLIGHT CHANCE OF DOZING
HOW LIKELY ARE YOU TO NOD OFF OR FALL ASLEEP IN A CAR, WHILE STOPPED FOR A FEW MINUTES IN TRAFFIC: SLIGHT CHANCE OF DOZING
HOW LIKELY ARE YOU TO NOD OFF OR FALL ASLEEP WHILE LYING DOWN TO REST IN THE AFTERNOON WHEN CIRCUMSTANCES PERMIT: HIGH CHANCE OF DOZING
HOW LIKELY ARE YOU TO NOD OFF OR FALL ASLEEP WHILE WATCHING TV: HIGH CHANCE OF DOZING
HOW LIKELY ARE YOU TO NOD OFF OR FALL ASLEEP WHILE SITTING INACTIVE IN A PUBLIC PLACE: SLIGHT CHANCE OF DOZING

## 2024-03-15 NOTE — TELEPHONE ENCOUNTER
Vitamin D3 5000 units       Last Written Prescription Date:  Patient reported   Last Office Visit: 2/21/24      Vitron C  mg  Last Written Prescription Date:  Patient reported   Last Office Visit: 2/21/24  Future Office visit:

## 2024-03-15 NOTE — TELEPHONE ENCOUNTER
Magnesium oxide 400 mg       Last Written Prescription Date:  10/3/23  Last Fill Quantity: 90,   # refills: 3  Last Office Visit: 2/21/24  Future Office visit:       Routing refill request to provider for review/approval because:  Requesting different pharmacy

## 2024-03-19 ENCOUNTER — MYC MEDICAL ADVICE (OUTPATIENT)
Dept: FAMILY MEDICINE | Facility: OTHER | Age: 49
End: 2024-03-19

## 2024-03-19 DIAGNOSIS — Z76.89 ENCOUNTER FOR WEIGHT MANAGEMENT: Primary | ICD-10-CM

## 2024-03-19 NOTE — TELEPHONE ENCOUNTER
I called and spoke to [facility], they state this medication needs a prior authorization. Are you able to assist with this?  Thank you so much.

## 2024-03-19 NOTE — TELEPHONE ENCOUNTER
Chart review prior to sleep testing.    Patient Summary:  48 year old female who is referred for chronic fatigue and sleep disordered breathing.    Patient Active Problem List    Diagnosis Date Noted    Other specified anemias 04/20/2022     Priority: Medium    Cardiomyopathy, unspecified type (H) 04/11/2022     Priority: Medium     Formatting of this note might be different from the original.  Added automatically from request for surgery [id number]      Obstructive sleep apnea syndrome 04/11/2022     Priority: Medium     Formatting of this note might be different from the original.  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  Added automatically from request for surgery [id number]    Formatting of this note might be different from the original.  Added automatically from request for surgery [id number]      Opioid use, unspecified, uncomplicated 01/06/2022     Priority: Medium    Chronic pain of both knees 10/26/2021     Priority: Medium    Knee joint stiffness, bilateral 10/26/2021     Priority: Medium    Muscle weakness 10/26/2021     Priority: Medium    Premenopausal patient 09/30/2021     Priority: Medium    Osteoarthritis of both hips 09/17/2020     Priority: Medium    Menorrhagia with regular cycle--US normal--PROVERA daily---10/2019 10/23/2019     Priority: Medium    Morbid obesity (H) 10/10/2019     Priority: Medium    Insulin resistance syndrome 09/20/2018     Priority: Medium    Benign essential hypertension 11/30/2015     Priority: Medium    Vitamin D deficiency 11/30/2015     Priority: Medium    Major depressive disorder, recurrent episode (H24) 07/14/2011     Priority: Medium     Problem list name updated by automated process. Provider to review    "   GERD (gastroesophageal reflux diseae) 07/14/2011     Priority: Medium       Current Outpatient Medications   Medication    ACETAMINOPHEN EXTRA STRENGTH 500 MG tablet    cholecalciferol (VITAMIN D3) 125 mcg (5000 units) capsule    Elemental iron 65 mg Vitamin C 125 mg (VITRON C)  MG TABS tablet    fluticasone (FLONASE) 50 MCG/ACT nasal spray    losartan (COZAAR) 25 MG tablet    magnesium oxide 400 MG tablet    medroxyPROGESTERone (PROVERA) 10 MG tablet    metFORMIN (GLUCOPHAGE XR) 500 MG 24 hr tablet    metoprolol succinate ER (TOPROL XL) 25 MG 24 hr tablet    potassium chloride ER (K-TAB/KLOR-CON) 10 MEQ CR tablet    sertraline (ZOLOFT) 100 MG tablet    WEGOVY 1 MG/0.5ML pen     No current facility-administered medications for this visit.       Pertinent PMHx of obesity, hypertension, cardiomyopathy, major depressive disorder, status post right hip total replacement.    STOP-BANG score of 6, with unknown neck circumference.  Monroe score of 16.  FRANCO: 19    BMI of Estimated body mass index is 45.58 kg/m  as calculated from the following:    Height as of 5/25/23: 1.727 m (5' 8\").    Weight as of 2/21/24: 136 kg (299 lb 12.8 oz).     Chief concern per questionnaire: \"I am always tires, i snore and i dont feel like i sleep well. \"    Duration of symptoms:  \"i almost feel like it has always happened. \"    Goals for visit per questionnaire: \"to get a good night sleep. \"    Sleep pattern:  [place]days.  [place] to 6:30-7am.  Weekends.  [place] to 10:30am.  Time to fall asleep: ~60 minutes.  Awakenings: 3 times per night, 0 minutes to return to sleep.  Average total sleep time:  3 hours (?)  Napping.  5 days per week, 2 hours per nap.    Yes for reading, phone, work in bed.    Yes for RLS screen.  No for sleep walking.  Yes for dream enactment behavior.  Yes for bruxism.    Yes for morning headaches.  Yes for snoring.  Yes for observed apnea.  Yes for suspicion for TROY in mother, father.    Caffeine use:  No for 3+ per day.  ? " for within 6 hours of bed.    A/P:    1.)  High likelihood of TROY with STOP-BANG score of 6.   - Would appear to be candidate for either home sleep testing or in-lab PSG.    2.)  Chronic insomnia  - Reported total sleep time is hard to correlate with reported patterns on questionnaire  -If no sleep disordered breathing observed, consider actigraphy with sleep diaries to get a more subjective and objective picture of sleep-wake pattern with need for adequate sleep time.    - There is clear psychophysiological components to chronic insomnia.    ---  This note was written with the assistance of the Dragon voice-dictation technology software. The final document, although reviewed, may contain errors. For corrections, please contact the office.    Orestes Choudhary MD    Sleep Medicine  [facility]  - [place], [place]  Main Office: [phone]  [facility] [address], [address]  Schedule visits: [phone]  Main Office: [phone]  Fax: [phone]

## 2024-03-19 NOTE — PROGRESS NOTES
03/15/24 0921   Reason For Your Visit   Please briefly describe the main reason(s) for your sleep visit I am always tires, i snore and i dont feel like i sleep well.   Approximately when did this problem start i almost feel like it has always happened.   What are your goals for this visit to get a good night sleep.   Time in Bed - Work Or School Days   Do you work or go to school Yes   What time do you usually get into bed 12am   About how long does it take you to fall asleep it varys but usually about an hour   How often do you have trouble falling asleep 5   How often do you wake up during the night 3   Do you work days/evenings/nights/rotating shifts Days   What wakes you up at night Snorting self awake;External stimuli (bed partner, pets, noise, etc);Use the bathroom;Uncertain   How often do you have trouble falling back to sleep 0   About how long does it take to fall back to sleep 0   What do you usually do if you have trouble getting back to sleep try to relax, think about non stressful things, cuddle, read.   What time do you usually get out of bed to start your day 6:30-7am on work days. 10:30ish on weekend.   Do you use an alarm Yes   Time in Bed - Weekends/Non-work Days/All Other Days   What time do you usually get into bed 12am   About how long does it take you to fall asleep 1 hour   What time do you usually get out of bed to start your day 11am   Do you use an alarm No   Sleep Need   On average, about how much sleep do you think you get 3 hours a night sometimes.   About how much sleep do you think you need 7-8   Sleep Position   Which sleep positions do you prefer Side;Stomach   Do you do any of the following activities in bed Read;Use phone, computer, or tablet;Work   How often do you take a nap on purpose 5   About how long are your naps 2 hours or more   Do you feel better after naps No   How often do you doze off unintentionally 1   Have you ever had a driving accident or near-miss due to  sleepiness/drowsiness No   Sleep Disruptions - Breathing/Snoring   Do you snore Yes   Do other people complain about your snoring Yes   Have you been told you stop breathing in your sleep Yes   Do you have issues with any of the following Morning headaches;Morning mouth dryness;Stuffy nose when you wake up;Heartburn or reflux at night;Getting up to urinate more than once   Sleep Disruptions - Movement   Do you get pain, discomfort, with an urge to move Yes   Does it happen when you are resting Yes   Does it get better if you move around Yes   Does it happen more at night Yes   Have you been told you kick your legs at night Yes   Sleep Disruptions - Behaviours in Sleep   Have you ever experienced any of the following during your sleep Teeth grinding;Kicking or punching   Do you ever experience sudden muscle weakness during the day No   4) Is there anything else you would like your sleep provider to know I had three hip surgeries in the last two years and during that time i had to sleep on my back. i feel like since then it has gotten worse trying to sleep. I seem to have trouble getting comfortable, my ears ring  and my nose feels like it is plugged   Caffeine, Alcohol and Other Substances   How many caffeinated beverages (coffee, tea, soda, energy drinks) per day 2   What time of day is your last caffeine use varies   Do you drink alcohol to help you sleep No   Do you drink alcohol near bedtime No   Family History   Has any family member been diagnosed with a sleep disorder Yes   If yes, please indicate both my mom and dad snore and my dad had to get his nose done due to it. neither have been diagnosed   In the last TWO WEEKS have you experienced any of the following symptoms?   Fevers No   Night Sweats Yes   Weight Gain No   Pain at Night No   Double Vision No   Changes in Vision No   Difficulty Breathing through Nose Yes   Sore Throat in Morning No   Dry Mouth in the Morning Yes   Shortness of Breath Lying Flat No    Shortness of Breath With Activity Yes   Awakening with Shortness of Breath No   Increased Cough No   Heart Racing at Night No   Swelling in Feet or Legs No   Diarrhea at Night Yes   Heartburn at Night Yes   Urinating More than Once at Night Yes   Losing Control of Urine at Night No   Joint Pains at Night Yes   Headaches in Morning Yes   Weakness in Arms or Legs No   Depressed Mood No   Anxiety Yes         3/15/2024     9:24 AM    Irwin Sleepiness Scale ( KRISHNA Underwood  [id number]<br>ESS - [place]/English - Final version - 21 Nov 07 - [facility] [place].)   Sitting and reading High chance of dozing   Watching TV High chance of dozing   Sitting, inactive in a public place (e.g. a theatre or a meeting) Slight chance of dozing   As a passenger in a car for an hour without a break High chance of dozing   Lying down to rest in the afternoon when circumstances permit High chance of dozing   Sitting and talking to someone Slight chance of dozing   Sitting quietly after a lunch without alcohol Slight chance of dozing   In a car, while stopped for a few minutes in traffic Slight chance of dozing   Irwin Score (MC) 16   Irwin Score (Sleep) 16         3/15/2024     9:23 AM   Insomnia Severity Index (FRANCO)   Difficulty falling asleep 4   Difficulty staying asleep 1   Problems waking up too early 0   How SATISFIED/DISSATISFIED are you with your CURRENT sleep pattern? 4   How NOTICEABLE to others do you think your sleep problem is in terms of impairing the quality of your life? 2   How WORRIED/DISTRESSED are you about your current sleep problem? 4   To what extent do you consider your sleep problem to INTERFERE with your daily functioning (e.g. daytime fatigue, mood, ability to function at work/daily chores, concentration, memory, mood, etc.) CURRENTLY? 4   FRANCO Total Score 19         3/15/2024     9:23 AM   STOP BANG Questionnaire (  2008, the American Society of Anesthesiologists, Inc[employer]    1. Snoring - Do you snore loudly (louder than talking or loud enough to be heard through closed doors)? Yes   2. Tired - Do you often feel tired, fatigued, or sleepy during daytime? Yes   3. Observed - Has anyone observed you stop breathing during your sleep? Yes   4. Blood pressure - Do you have or are you being treated for high blood pressure? Yes   5. BMI - BMI more than 35 kg/m2? Yes   6. Age - Age over 50 yr old? No   7. Neck circumference - Neck circumference greater than 40 cm? No   8. Gender - Gender male? No   STOP BANG Score (MC): 6 (High risk of TROY)

## 2024-03-25 NOTE — TELEPHONE ENCOUNTER
Pended up Wegovy 1.7 mg per pt request.  Diagnosis: encounter for weight management.  Please review and sign if appropriate.    Pt called and is doing well at the 1.0 mg dose and was due for he next injection on Saturday but her insurance company will not cover anymore 1.0 mg but will cover the 1.7 mg dose.  LOV 02/21/2023.

## 2024-04-09 ENCOUNTER — VIRTUAL VISIT (OUTPATIENT)
Dept: SLEEP MEDICINE | Facility: HOSPITAL | Age: 49
End: 2024-04-09
Attending: FAMILY MEDICINE
Payer: COMMERCIAL

## 2024-04-09 DIAGNOSIS — E66.01 MORBID OBESITY (H): ICD-10-CM

## 2024-04-09 DIAGNOSIS — G47.33 OSA (OBSTRUCTIVE SLEEP APNEA): Primary | ICD-10-CM

## 2024-04-10 DIAGNOSIS — G47.33 OSA (OBSTRUCTIVE SLEEP APNEA): Primary | ICD-10-CM

## 2024-04-10 RX ORDER — METFORMIN HCL 500 MG
1000 TABLET, EXTENDED RELEASE 24 HR ORAL 2 TIMES DAILY WITH MEALS
Qty: 120 TABLET | Refills: 11 | Status: SHIPPED | OUTPATIENT
Start: 2024-04-10

## 2024-04-10 NOTE — TELEPHONE ENCOUNTER
Metformin 500 MG      Last Written Prescription Date:  01/29/24  Last Fill Quantity: 120,   # refills: 2  Last Office Visit: 02/21/24  Future Office visit:       Routing refill request to provider for review/approval because:  Medication indicated for associated diagnosis    Medication is associated with one or more of the following diagnoses:                Gestational diabetes mellitus               Hyperinsulinar obesity               Hypersecretion of ovarian androgens               Non-alcoholic fatty liver               Polycystic ovarian syndrome               Pre-diabetes (DM 2 prevention)               Type 2 diabetes mellitus               Weight gain, antipsychotic therapy-induced

## 2024-04-10 NOTE — PROGRESS NOTES
Patient was provided both verbal and written education and instructions on use of Watch PAT device. Watch PAT device has been registered and shipped via [employer] on 4/10/2024. Patient was notified that package was mailed out. Watch PAT serial number: [id number]    Tracking # [id number].

## 2024-04-23 ENCOUNTER — DOCUMENTATION ONLY (OUTPATIENT)
Dept: SLEEP MEDICINE | Facility: HOSPITAL | Age: 49
End: 2024-04-23
Attending: FAMILY MEDICINE
Payer: COMMERCIAL

## 2024-04-23 DIAGNOSIS — G47.33 OSA (OBSTRUCTIVE SLEEP APNEA): ICD-10-CM

## 2024-04-23 PROCEDURE — 95800 SLP STDY UNATTENDED: CPT

## 2024-04-23 PROCEDURE — 95800 SLP STDY UNATTENDED: CPT | Mod: 26 | Performed by: FAMILY MEDICINE

## 2024-04-23 NOTE — PROCEDURES
"WatchPAT - HOME SLEEP STUDY INTERPRETATION    Patient: Vannessa Gupta  MRN: [id number]  YOB: 1975  Study Date: 4/22/2024  Referring Provider: Aaliyah Suarez  Ordering Provider: Orestes Choudhary MD, MD    Chain of custody patient verification was not enabled.       Indications for Home Study: Vannessa Gupta is a 48 year old female with a history of obesity, hypertension, cardiomyopathy, major depressive disorder, status post right hip total replacement who presents with symptoms suggestive of obstructive sleep apnea.    Estimated body mass index is 45.58 kg/m  as calculated from the following:    Height as of 5/25/23: 1.727 m (5' 8\").    Weight as of 2/21/24: 136 kg (299 lb 12.8 oz).  Total score - [place]: 16 (3/15/2024  9:24 AM)  Total Score: 5 (3/15/2024  9:23 AM)    Data: A full night home sleep study was performed recording the standard physiologic parameters including peripheral arterial tonometry (PAT), sound/snoring, body position,  movement, sound, and oxygen saturation by pulse oximetry. Pulse rate was estimated by oximetry recording. Sleep staging (wake, REM, light, and deep sleep) was derived from PAT signal.  This study was considered adequate based on > 4 hours of quality oximetry and respiratory recording. As specified by the AASM Manual for the Scoring of Sleep and Associated events, version 2.3, Rule VIII.D 1B, 4% oxygen desaturation scoring for hypopneas is used as a standard of care on all home sleep apnea testing.    Total Recording Time: 7 hrs, 30 min  Total Sleep Time: 6 hrs, 23 min  % of Sleep Time REM: 14.1%    Respiratory:  Snoring: Snoring was present.  Respiratory events: The PAT respiratory disturbance index [pRDI] was 22 events per hour.  The PAT apnea/hypopnea index [pAHI] was 16.7 events per hour.  OLIVIA was 13.6 events per hour.  During REM sleep the pAHI was 36.  Sleep Associated Hypoxemia: sustained hypoxemia was not present. Mean oxygen saturation was 94%.  " Minimum was 85%.  Time with saturation less than 88% was 1.4 minutes.    Heart Rate: By pulse oximetry normal rate was noted.     Position: Percent of time spent: supine - 50.6%, prone - 12.9%, on right - 19.8%, on left - 16.6%.  pAHI was 11.8 per hour supine, 17 per hour prone, 4.8 per hour on right side, and 44.8 per hour on left side.     Assessment:   Moderate obstructive sleep apnea.  Sleep associated hypoxemia was not present.    Recommendations:  Consider auto-CPAP at 5-15 cmH2O, oral appliance therapy, or polysomnography with full night PAP titration.  Suggest optimizing sleep hygiene and avoiding sleep deprivation.  Weight management.    Diagnosis Code(s): Obstructive Sleep Apnea G47.33    Orestes Choudhary MD, MD, April 23, 2024   Diplomate, American Board of Family Medicine, Sleep Medicine

## 2024-04-23 NOTE — PROGRESS NOTES
WatchPAT data has been received and has been scored using rule 1B, 4%. Patient to follow up with provider to determine appropriate therapy.    Pat AHI: 16.7    Ordering Provider: Dr Orestes Choudhary      Sleep Technician/Technologist: Cinda LIMA

## 2024-05-05 DIAGNOSIS — E87.6 HYPOKALEMIA: ICD-10-CM

## 2024-05-05 DIAGNOSIS — I10 BENIGN ESSENTIAL HYPERTENSION: ICD-10-CM

## 2024-05-06 RX ORDER — LOSARTAN POTASSIUM 25 MG/1
25 TABLET ORAL DAILY
Qty: 90 TABLET | Refills: 2 | Status: SHIPPED | OUTPATIENT
Start: 2024-05-06

## 2024-05-06 RX ORDER — POTASSIUM CHLORIDE 750 MG/1
10 TABLET, EXTENDED RELEASE ORAL DAILY
Qty: 90 TABLET | Refills: 2 | Status: SHIPPED | OUTPATIENT
Start: 2024-05-06

## 2024-05-06 RX ORDER — METOPROLOL SUCCINATE 25 MG/1
25 TABLET, EXTENDED RELEASE ORAL DAILY
Qty: 90 TABLET | Refills: 2 | Status: SHIPPED | OUTPATIENT
Start: 2024-05-06

## 2024-06-04 ENCOUNTER — VIRTUAL VISIT (OUTPATIENT)
Dept: PULMONOLOGY | Facility: OTHER | Age: 49
End: 2024-06-04
Attending: FAMILY MEDICINE
Payer: COMMERCIAL

## 2024-06-04 VITALS — HEIGHT: 68 IN | BODY MASS INDEX: 43.6 KG/M2 | WEIGHT: 287.7 LBS

## 2024-06-04 DIAGNOSIS — G47.33 OSA (OBSTRUCTIVE SLEEP APNEA): Primary | ICD-10-CM

## 2024-06-04 DIAGNOSIS — E66.01 MORBID OBESITY (H): ICD-10-CM

## 2024-06-04 PROCEDURE — 99213 OFFICE O/P EST LOW 20 MIN: CPT | Mod: 95 | Performed by: FAMILY MEDICINE

## 2024-06-04 ASSESSMENT — PAIN SCALES - GENERAL: PAINLEVEL: NO PAIN (0)

## 2024-06-04 NOTE — PROGRESS NOTES
"Virtual Visit Details    Type of service:  Video Visit     Originating Location (pt. Location): {video visit patient location:176895::\"Home\"}  {PROVIDER LOCATION On-site should be selected for visits conducted from your clinic location or adjoining [facility] hospital, academic office, or other nearby [facility] building. Off-site should be selected for all other provider locations, including home:[id number]}  Distant Location (provider location):  {virtual location provider:072722}  Platform used for Video Visit: {Virtual Visit Platforms:753084::\"GreenButton\"}    "

## 2024-06-04 NOTE — NURSING NOTE
Has patient had flu shot for current/most recent flu season? If so, when? Yes: 02/21/2024    Is the patient currently in the state of [place]? YES    Visit mode:VIDEO    If the visit is dropped, the patient can be reconnected by: VIDEO VISIT: Send to e-mail at: [email or web address]    Will anyone else be joining the visit? NO  (If patient encounters technical issues they should call [phone])    How would you like to obtain your AVS? MyChart    Are changes needed to the allergy or medication list? No    Are refills needed on medications prescribed by this physician? NO    Reason for visit: RECHECK    No other vitals to report per pt    Yen RAMOSF

## 2024-06-04 NOTE — PROGRESS NOTES
"Vannessa Gupta is a 48 year old female who is being evaluated via a billable video visit.       The patient has been notified of following:      \"This video visit will be conducted via a call between you and your physician/provider. We have found that certain health care needs can be provided without the need for an in-person physical exam.  This service lets us provide the care you need with a video conversation.  If a prescription is necessary we can send it directly to your pharmacy.  If lab work is needed we can place an order for that and you can then stop by our lab to have the test done at a later time.     Video visits are billed at different rates depending on your insurance coverage.  Please reach out to your insurance provider with any questions.     If during the course of the call the physician/provider feels a video visit is not appropriate, you will not be charged for this service.\"     Patient has given verbal consent for Video visit? Yes  How would you like to obtain your AVS? Mail a copy  If you are dropped from the video visit, the video invite should be resent to: Text to cell phone: -  Will anyone else be joining your video visit? No  If patient encounters technical issues they should call [phone]      Video-Visit Details     Type of service:  Video Visit     Start Time:  3:30pm  End Time:  3:50pm    Originating Location (pt. Location): Home     Distant Location (provider location):  Off-site, [facility]       Platform used for Video Visit: XING    Virtual visit for review of home sleep testing results.     A/P:     1.)  Moderate TROY (pAHI 16.7) without sleep-associated hypoxemia    Given current focus and success with weight loss, our plan is to continue weight loss as primary treatment modality with follow-up in 3 months.  Consider repeat testing at that time.     2.)  Chronic insomnia  - Reported total sleep time is hard to correlate with reported patterns " "on questionnaire  -If no sleep disordered breathing observed, consider actigraphy with sleep diaries to get a more subjective and objective picture of sleep-wake pattern with need for adequate sleep time.    - There is likely psychophysiological components to chronic insomnia.    SUBJECTIVE:  Vannessa Gupta is a 48 year old female.    48 year old female who is referred for chronic fatigue and sleep disordered breathing.     Pertinent PMHx of obesity, hypertension, cardiomyopathy (resolved as of last echo 5/2024 with LVEF 40 -> 55%), major depressive disorder, status post right hip total replacement.     STOP-BANG score of 6, with unknown neck circumference.  [place] score of 16.  FRANCO: 19     BMI of Estimated body mass index is 45.58 kg/m  as calculated from the following:    Height as of 5/25/23: 1.727 m (5' 8\").    Weight as of 2/21/24: 136 kg (299 lb 12.8 oz).      Today - We reviewed her home sleep test results in detail.    Chief concern per questionnaire: \"I am always tires, i snore and i dont feel like i sleep well. \"     Duration of symptoms:  \"i almost feel like it has always happened. \"     Goals for visit per questionnaire: \"to get a good night sleep. \"     Sleep pattern:  [place]days.  [place] to 6:30-7am.  Weekends.  [place] to 10:30am.  Time to fall asleep: ~60 minutes.  Awakenings: 3 times per night, 0 minutes to return to sleep.  Average total sleep time:  3 hours (?)  Napping.  5 days per week, 2 hours per nap.     Yes for reading, phone, work in bed.     Yes for RLS screen.  No for sleep walking.  Yes for dream enactment behavior.  Yes for bruxism.     Yes for morning headaches.  Yes for snoring.  Yes for observed apnea.  Yes for suspicion for TROY in mother, father.     Caffeine use:  No for 3+ per day.  ? for within 6 hours of bed.    WatchPAT - HOME SLEEP STUDY INTERPRETATION     Patient: Vannessa Gupta  MRN: [id number]  YOB: 1975  Study Date: 4/22/2024  Referring Provider: Aaliyah Suarez " "J  Ordering Provider: Orestes Choudhary MD, MD     Chain of custody patient verification was not enabled.       Indications for Home Study: Vannessa Gupta is a 48 year old female with a history of obesity, hypertension, cardiomyopathy, major depressive disorder, status post right hip total replacement who presents with symptoms suggestive of obstructive sleep apnea.     Estimated body mass index is 45.58 kg/m  as calculated from the following:    Height as of 5/25/23: 1.727 m (5' 8\").    Weight as of 2/21/24: 136 kg (299 lb 12.8 oz).  Total score - [place]: 16 (3/15/2024  9:24 AM)  Total Score: 5 (3/15/2024  9:23 AM)     Data: A full night home sleep study was performed recording the standard physiologic parameters including peripheral arterial tonometry (PAT), sound/snoring, body position,  movement, sound, and oxygen saturation by pulse oximetry. Pulse rate was estimated by oximetry recording. Sleep staging (wake, REM, light, and deep sleep) was derived from PAT signal.  This study was considered adequate based on > 4 hours of quality oximetry and respiratory recording. As specified by the AASM Manual for the Scoring of Sleep and Associated events, version 2.3, Rule VIII.D 1B, 4% oxygen desaturation scoring for hypopneas is used as a standard of care on all home sleep apnea testing.     Total Recording Time: 7 hrs, 30 min  Total Sleep Time: 6 hrs, 23 min  % of Sleep Time REM: 14.1%     Respiratory:  Snoring: Snoring was present.  Respiratory events: The PAT respiratory disturbance index [pRDI] was 22 events per hour.  The PAT apnea/hypopnea index [pAHI] was 16.7 events per hour.  OLIVIA was 13.6 events per hour.  During REM sleep the pAHI was 36.  Sleep Associated Hypoxemia: sustained hypoxemia was not present. Mean oxygen saturation was 94%.  Minimum was 85%.  Time with saturation less than 88% was 1.4 minutes.     Heart Rate: By pulse oximetry normal rate was noted.      Position: Percent of time spent: " supine - 50.6%, prone - 12.9%, on right - 19.8%, on left - 16.6%.  pAHI was 11.8 per hour supine, 17 per hour prone, 4.8 per hour on right side, and 44.8 per hour on left side.      Assessment:   Moderate obstructive sleep apnea.  Sleep associated hypoxemia was not present.     Recommendations:  Consider auto-CPAP at 5-15 cmH2O, oral appliance therapy, or polysomnography with full night PAP titration.  Suggest optimizing sleep hygiene and avoiding sleep deprivation.  Weight management.     Diagnosis Code(s): Obstructive Sleep Apnea G47.33     Orestes Choudhary MD, MD, April 23, 2024   Diplomate, American Board of Family Medicine, Sleep Medicine      Past medical history:    Patient Active Problem List    Diagnosis Date Noted    Other specified anemias 04/20/2022     Priority: Medium    Cardiomyopathy, unspecified type (H) 04/11/2022     Priority: Medium     Formatting of this note might be different from the original.  Added automatically from request for surgery [id number]      Obstructive sleep apnea syndrome 04/11/2022     Priority: Medium     Formatting of this note might be different from the original.  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  Added automatically from request for surgery [id number]    Formatting of this note might be different from the original.  Added automatically from request for surgery [id number]      Opioid use, unspecified, uncomplicated 01/06/2022     Priority: Medium    Chronic pain of both knees 10/26/2021     Priority: Medium    Knee joint stiffness, bilateral 10/26/2021     Priority: Medium    Muscle weakness 10/26/2021     Priority: Medium    Premenopausal patient 09/30/2021     Priority: Medium    Osteoarthritis of  both hips 09/17/2020     Priority: Medium    Menorrhagia with regular cycle--US normal--PROVERA daily---10/2019 10/23/2019     Priority: Medium    Morbid obesity (H) 10/10/2019     Priority: Medium    Insulin resistance syndrome 09/20/2018     Priority: Medium    Benign essential hypertension 11/30/2015     Priority: Medium    Vitamin D deficiency 11/30/2015     Priority: Medium    Major depressive disorder, recurrent episode (H24) 07/14/2011     Priority: Medium     Problem list name updated by automated process. Provider to review      GERD (gastroesophageal reflux diseae) 07/14/2011     Priority: Medium       10 point ROS of systems including Constitutional, Eyes, Respiratory, Cardiovascular, Gastroenterology, Genitourinary, Integumentary, Muscularskeletal, Psychiatric were all negative except for pertinent positives noted in my HPI.    Current Outpatient Medications   Medication Sig Dispense Refill    ACETAMINOPHEN EXTRA STRENGTH 500 MG tablet  (Patient not taking: Reported on 2/21/2024)      cholecalciferol (VITAMIN D3) 125 mcg (5000 units) capsule Take 1 capsule (125 mcg) by mouth 2 times daily 30 capsule 3    Elemental iron 65 mg Vitamin C 125 mg (VITRON C)  MG TABS tablet Take 1 tablet by mouth daily 90 tablet 1    fluticasone (FLONASE) 50 MCG/ACT nasal spray Spray 1 spray into both nostrils daily 16 g 1    losartan (COZAAR) 25 MG tablet TAKE 1 TABLET DAILY 90 tablet 2    magnesium oxide 400 MG tablet Take 1 tablet (400 mg) by mouth daily 90 tablet 3    medroxyPROGESTERone (PROVERA) 10 MG tablet TAKE 1 TABLET DAILY 90 tablet 3    metFORMIN (GLUCOPHAGE XR) 500 MG 24 hr tablet TAKE 2 TABLETS TWICE A DAY WITH MEALS 120 tablet 11    metoprolol succinate ER (TOPROL XL) 25 MG 24 hr tablet TAKE 1 TABLET DAILY 90 tablet 2    potassium chloride ER (K-TAB/KLOR-CON) 10 MEQ CR tablet TAKE 1 TABLET DAILY 90 tablet 2    Semaglutide-Weight Management (WEGOVY) 1.7 MG/0.75ML pen Inject 1.7 mg Subcutaneous once a week 3  mL 2    sertraline (ZOLOFT) 100 MG tablet TAKE 1 AND ONE-HALF TABLETS (150MG) BY MOUTH DAILY 135 tablet 3    WEGOVY 1 MG/0.5ML pen INJECT 1MG SUBCUTANEOUSLY EVERY WEEK 4 mL 0       OBJECTIVE:  There were no vitals taken for this visit.    Physical Exam     ---  This note was written with the assistance of the Dragon voice-dictation technology software. The final document, although reviewed, may contain errors. For corrections, please contact the office.    Total time spent preparing to see the patient, review of chart, obtaining history and physical examination, review of sleep testing, review of treatment options, education, discussion with patient and documenting in Epic / EMR was 25 minutes.  All time involved was spent on the day of service for the patient (the same day as the patient's appointment).    Orestes Choudhary MD    Sleep Medicine  [place], [place]  Main Office: [phone]  [facility] [place], [place]  [address], [address]  Schedule visits: [phone]  Main Office: [phone]  Fax: [phone]

## 2024-06-27 ENCOUNTER — MYC MEDICAL ADVICE (OUTPATIENT)
Dept: FAMILY MEDICINE | Facility: OTHER | Age: 49
End: 2024-06-27

## 2024-06-27 ENCOUNTER — LAB (OUTPATIENT)
Dept: LAB | Facility: OTHER | Age: 49
End: 2024-06-27
Payer: COMMERCIAL

## 2024-06-27 DIAGNOSIS — R82.90 MALODOROUS URINE: Primary | ICD-10-CM

## 2024-06-27 DIAGNOSIS — R82.90 MALODOROUS URINE: ICD-10-CM

## 2024-06-27 LAB
ALBUMIN UR-MCNC: NEGATIVE MG/DL
APPEARANCE UR: CLEAR
BACTERIA #/AREA URNS HPF: ABNORMAL /HPF
BILIRUB UR QL STRIP: NEGATIVE
COLOR UR AUTO: ABNORMAL
GLUCOSE UR STRIP-MCNC: NEGATIVE MG/DL
HGB UR QL STRIP: NEGATIVE
KETONES UR STRIP-MCNC: NEGATIVE MG/DL
LEUKOCYTE ESTERASE UR QL STRIP: NEGATIVE
MUCOUS THREADS #/AREA URNS LPF: PRESENT /LPF
NITRATE UR QL: NEGATIVE
PH UR STRIP: 6.5 [PH] (ref 4.7–8)
RBC URINE: <1 /HPF
SP GR UR STRIP: 1.02 (ref 1–1.03)
SQUAMOUS EPITHELIAL: 0 /HPF
UROBILINOGEN UR STRIP-MCNC: 3 MG/DL
WBC URINE: 3 /HPF

## 2024-06-27 PROCEDURE — 81001 URINALYSIS AUTO W/SCOPE: CPT

## 2024-07-16 DIAGNOSIS — Z76.89 ENCOUNTER FOR WEIGHT MANAGEMENT: ICD-10-CM

## 2024-07-16 NOTE — TELEPHONE ENCOUNTER
Left message for patient to call back to see if she is having side effects with the Wegovy1.7 mg  Pended the Wegovy 2.4 mg      Semaglutide-Weight Management (WEGOVY) 1.7 MG/0.75ML pen       Last Written Prescription Date:  3/25/24  Last Fill Quantity: 3 ml,   # refills: 2  Last Office Visit: 2/21/24  Future Office visit:       Routing refill request to provider for review/approval because:

## 2024-07-22 DIAGNOSIS — H91.93 DECREASED HEARING OF BOTH EARS: Primary | ICD-10-CM

## 2024-07-23 RX ORDER — SEMAGLUTIDE 1.7 MG/.75ML
INJECTION, SOLUTION SUBCUTANEOUS
Qty: 4 ML | Refills: 0 | Status: SHIPPED | OUTPATIENT
Start: 2024-07-23

## 2024-07-29 ENCOUNTER — OFFICE VISIT (OUTPATIENT)
Dept: OTOLARYNGOLOGY | Facility: OTHER | Age: 49
End: 2024-07-29
Attending: OTOLARYNGOLOGY
Payer: COMMERCIAL

## 2024-07-29 ENCOUNTER — OFFICE VISIT (OUTPATIENT)
Dept: AUDIOLOGY | Facility: OTHER | Age: 49
End: 2024-07-29
Attending: OTOLARYNGOLOGY
Payer: COMMERCIAL

## 2024-07-29 VITALS
SYSTOLIC BLOOD PRESSURE: 124 MMHG | DIASTOLIC BLOOD PRESSURE: 86 MMHG | BODY MASS INDEX: 43.6 KG/M2 | RESPIRATION RATE: 18 BRPM | TEMPERATURE: 97.9 F | HEIGHT: 68 IN | OXYGEN SATURATION: 97 % | WEIGHT: 287.7 LBS | HEART RATE: 70 BPM

## 2024-07-29 DIAGNOSIS — H69.93 DYSFUNCTION OF EUSTACHIAN TUBE, BILATERAL: Primary | ICD-10-CM

## 2024-07-29 DIAGNOSIS — Z01.10 NORMAL HEARING NOTED ON EXAMINATION: ICD-10-CM

## 2024-07-29 DIAGNOSIS — H93.8X2 SENSATION OF PLUGGED EAR ON LEFT SIDE: ICD-10-CM

## 2024-07-29 DIAGNOSIS — Z01.10 NORMAL EAR EXAM: ICD-10-CM

## 2024-07-29 DIAGNOSIS — H91.93 DECREASED HEARING OF BOTH EARS: ICD-10-CM

## 2024-07-29 DIAGNOSIS — M26.622 TMJ TENDERNESS, LEFT: Primary | ICD-10-CM

## 2024-07-29 PROCEDURE — 92550 TYMPANOMETRY & REFLEX THRESH: CPT | Performed by: AUDIOLOGIST

## 2024-07-29 PROCEDURE — 99213 OFFICE O/P EST LOW 20 MIN: CPT | Mod: 25 | Performed by: NURSE PRACTITIONER

## 2024-07-29 PROCEDURE — 92557 COMPREHENSIVE HEARING TEST: CPT | Performed by: AUDIOLOGIST

## 2024-07-29 PROCEDURE — 31231 NASAL ENDOSCOPY DX: CPT | Performed by: NURSE PRACTITIONER

## 2024-07-29 ASSESSMENT — PAIN SCALES - GENERAL: PAINLEVEL: NO PAIN (0)

## 2024-07-29 NOTE — PATIENT INSTRUCTIONS
Rest the muscles of the jaw by eating soft foods, avoid chewing gum, avoid clenching or tensing the jaw  Apply moist warm heat to the affected jaw for 30 minutes at least twice daily  Physical therapy can be helpful as well  OTC (such as ibuprofen or aleve) NSAIDs per package directions  Oral splints or mouth guards as sometimes necessary    Follow up in 1-2 months for recheck      Thank you for allowing Sydney HICKS and our ENT team to participate in your care.  If your medications are too expensive, please call my nurse at the number listed below.  We can possibly change this medication.    If you have a scheduling or an appointment question please contact our Health Unit Coordinator at their direct line [phone] ext [phone]  ALL nursing questions or concerns can be directed to my Nurse Cuca [phone].

## 2024-07-29 NOTE — PROGRESS NOTES
Otolaryngology Note         Chief Complaint:     Patient presents with:  Hearing Problem: HEA. Referred by, Dr. Aaliyah Suarez             History of Present Illness:     Vannessa Gupta is a 49 year old female seen today for left ear issues     About a year ago she noted a plugging sensation that comes and goes in the left ear.  If she does some valsalva maneuvers it will open up for a short time, but then it goes back to feeling plugged again.     Symptoms have been present for about a year, she does not recall any preceding URI   No concerns for environmental allergies.    She has trouble breathing through her nose, feels like she can breath better if she pulls the nasal dorsum back. ? Nasal valve collapse.   She is able to smell and taste.    No history of recurrent sinusitis.     + history of tonsillitis and ear infections as a child, s/p tonsillectomy with improvement.  No history of ear surgery.      There have been not been many infections.  The onset was gradual.    Treatment has included - flonase and benadryl without improvement.      There is occasional left otalgia, worse with sneezing and coughing, yawning,  She does clench and grind, does not currently wear a mouthguard.   She reports it feels like she is breathing out of her ear at times, feels like she can suck air into her left ear  No recent weight change   No rotary vertigo  Some air leaking sounding tinnitus    No facial numbness or tingling.   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    + family history of hearing loss - father with later adult onset hearing loss  Former smoker - quit 23 years ago.  Light smoker for 10 years or so    Audiogram completed 7/29/24:   Thresholds are WNL  SRT 10 dB bilaterally  Type A tymps bilaterally  Word discrimination 100% at 55 dB bilaterally         Medications:     Current Outpatient Rx   Medication Sig Dispense Refill    cholecalciferol (VITAMIN D3) 125 mcg (5000 units)  capsule Take 1 capsule (125 mcg) by mouth 2 times daily 30 capsule 3    Elemental iron 65 mg Vitamin C 125 mg (VITRON C)  MG TABS tablet Take 1 tablet by mouth daily 90 tablet 1    losartan (COZAAR) 25 MG tablet TAKE 1 TABLET DAILY 90 tablet 2    magnesium oxide 400 MG tablet Take 1 tablet (400 mg) by mouth daily 90 tablet 3    medroxyPROGESTERone (PROVERA) 10 MG tablet TAKE 1 TABLET DAILY 90 tablet 3    metFORMIN (GLUCOPHAGE XR) 500 MG 24 hr tablet TAKE 2 TABLETS TWICE A DAY WITH MEALS 120 tablet 11    metoprolol succinate ER (TOPROL XL) 25 MG 24 hr tablet TAKE 1 TABLET DAILY 90 tablet 2    potassium chloride ER (K-TAB/KLOR-CON) 10 MEQ CR tablet TAKE 1 TABLET DAILY 90 tablet 2    sertraline (ZOLOFT) 100 MG tablet TAKE 1 AND ONE-HALF TABLETS (150MG) BY MOUTH DAILY 135 tablet 3    ACETAMINOPHEN EXTRA STRENGTH 500 MG tablet  (Patient not taking: Reported on 2/21/2024)      fluticasone (FLONASE) 50 MCG/ACT nasal spray Spray 1 spray into both nostrils daily 16 g 1    Semaglutide-Weight Management (WEGOVY) 2.4 MG/0.75ML pen Inject 2.4 mg subcutaneously once a week 3 mL 3    WEGOVY 1 MG/0.5ML pen INJECT 1MG SUBCUTANEOUSLY EVERY WEEK 4 mL 0    WEGOVY 1.7 MG/0.75ML pen INJECT 1.7 MG SUBCUTANEOUSLY ONCE A WEEK 4 mL 0            Allergies:     Allergies: Iodine          Past Medical History:     Past Medical History:   Diagnosis Date    Benign essential hypertension 11/30/2015    Cardiomyopathy, unspecified (H)     right and left cath to be done; [place] -Dr Shafer; etiology? TROY, Vyvanse, other    Depressive disorder     GERD (gastroesophageal reflux diseae) 07/14/2011    Heart failure with reduced ejection fraction (H)     [facility]; EF 40-45%; mild hypokinesis LV; trace TR    Major depressive affective disorder, recurrent episode, unspecified 07/14/2011    Obesity  07/14/2011    Primary osteoarthritis of both hips     Dr De Leon; [facility]; planning total hip arthroplasty; goal weight first 250     "Vitamin D deficiency 2015            Past Surgical History:     Past Surgical History:   Procedure Laterality Date    ABDOMEN SURGERY  2005    I have had 2 c sections    ANGIOGRAM  2020    LHC - normal RCA/LCX; LAD luminal irregularities; Dr Hair; [facility]; plan cMRI    bilateral tubal ligation       section  ,        COLONOSCOPY N/A 2022    Procedure: Colonoscopy with polypectomy;  Surgeon: Jovanni Brown MD;  Location: [facility] OR    COLONOSCOPY      ENT SURGERY      Got my tonsils out    GYN SURGERY  2005        oligohydramnios      ORTHOPEDIC SURGERY  22 & 3-28-22    Total hip replacement left and right    Tonsillectomy      TOTAL HIP ARTHROPLASTY Right 2022    Dr De Leon    TOTAL HIP ARTHROPLASTY Left 2022    Dr De Leon    VASCULAR SURGERY  2022    Cath of both left and right side            Social History:     Social History     Tobacco Use    Smoking status: Former     Current packs/day: 0.00     Types: Cigarettes     Quit date: 2003     Years since quittin.6     Passive exposure: Past    Smokeless tobacco: Never    Tobacco comments:     Tried to quit   Vaping Use    Vaping status: Never Used   Substance Use Topics    Alcohol use: Yes     Comment: hasnt drank in a year    Drug use: No            Review of Systems:     ROS: See HPI         Physical Exam:     /86 (BP Location: Right arm, Patient Position: Sitting, Cuff Size: Adult Large)   Pulse 70   Temp 97.9  F (36.6  C) (Tympanic)   Resp 18   Ht 1.715 m (5' 7.52\")   Wt 130.5 kg (287 lb 11.2 oz)   SpO2 97%   BMI 44.37 kg/m      General - The patient is well nourished and well developed, and appears to have good nutritional status.  Alert and oriented to person and place, answers questions and cooperates with examination appropriately.   Head and Face - Normocephalic and atraumatic, with no gross asymmetry noted.  The facial nerve is intact, " with strong symmetric movements.  Voice and Breathing - The patient was breathing comfortably without the use of accessory muscles. There was no wheezing, stridor. The patients voice was clear and strong, and had appropriate pitch and quality.  Ears - External ear normal. Canals are patent. Right tympanic membrane is intact without effusion, retraction or mass. Left tympanic membrane is intact without effusion, retraction or mass.  Eyes - Extraocular movements intact, sclera were not icteric or injected, conjunctiva were pink and moist.  Mouth - Examination of the oral cavity showed pink, healthy oral mucosa. Dentition in good condition. No lesions or ulcerations noted. The tongue was mobile and midline.   TMJ - left tender to palpation with masseter muscle tenderness.    Throat - The walls of the oropharynx were smooth, pink, moist, symmetric, and had no lesions or ulcerations.  The tonsillar pillars and soft palate were symmetric. The uvula was midline on elevation.    Neck - Normal ROM, no palpable lymphadenopathy.  Palpation of the thyroid was soft and smooth, with no nodules or goiter appreciated.  The trachea was mobile and midline.  Nose - External contour is symmetric, no gross deflection or scars.  Nasal mucosa is pink and moist with no abnormal mucus.  The septum and turbinates were evaluated with nasal speculum, no polyps, masses, or purulence noted on examination.    To evaluate the nose and sinuses, I performed rigid nasal endoscopy.  I sprayed both nares with 2 sprays lidocaine and neosynephrine.     I began with the RIGHT side using a 0 degree rigid nasal endoscope, and then similarly examined the LEFT side     Findings:   Inferior turbinates:  grossly normal  Middle turbinate and middle meatus: normal  The superior meatus is examined and unremarkable  SER examined and unremarkable  Mucosa is healthy throughout,  no polyps nor polypoid degeneration  Nasopharynx clear, ET patent, no edema  The patient  tolerated the procedure well              Assessment and Plan:       ICD-10-CM    1. TMJ tenderness, left  M26.622       2. Sensation of plugged ear on left side  H93.8X2       3. Normal hearing noted on examination  Z01.10       4. Normal ear exam  Z01.10         Rest the muscles of the jaw by eating soft foods, avoid chewing gum, avoid clenching or tensing the jaw  Apply moist warm heat to the affected jaw for 30 minutes at least twice daily  Physical therapy can be helpful as well  OTC (such as ibuprofen or aleve) NSAIDs per package directions  Oral splints or mouth guards as sometimes necessary    Follow up in 1-2 months for recheck    Sydney HICKS  [facility] ENT

## 2024-07-29 NOTE — LETTER
7/29/2024      Vannessa Gupta  [address] [place] [address]      Dear Colleague,    Thank you for referring your patient, Vannessa Gupta, to the [facility]. Please see a copy of my visit note below.      Otolaryngology Note         Chief Complaint:     Patient presents with:  Hearing Problem: HEA. Referred by, Dr. Aaliyah Suarez             History of Present Illness:     Vannessa Gupta is a 49 year old female seen today for left ear issues     About a year ago she noted a plugging sensation that comes and goes in the left ear.  If she does some valsalva maneuvers it will open up for a short time, but then it goes back to feeling plugged again.     Symptoms have been present for about a year, she does not recall any preceding URI   No concerns for environmental allergies.    She has trouble breathing through her nose, feels like she can breath better if she pulls the nasal dorsum back. ? Nasal valve collapse.   She is able to smell and taste.    No history of recurrent sinusitis.     + history of tonsillitis and ear infections as a child, s/p tonsillectomy with improvement.  No history of ear surgery.      There have been not been many infections.  The onset was gradual.    Treatment has included - flonase and benadryl without improvement.      There is occasional left otalgia, worse with sneezing and coughing, yawning,  She does clench and grind, does not currently wear a mouthguard.   She reports it feels like she is breathing out of her ear at times, feels like she can suck air into her left ear  No recent weight change   No rotary vertigo  Some air leaking sounding tinnitus    No facial numbness or tingling.   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    + family history of hearing loss - father with later adult onset hearing loss  Former smoker - quit 23 years ago.  Light smoker for 10 years or so    Audiogram completed 7/29/24:   Thresholds  are WNL  SRT 10 dB bilaterally  Type A tymps bilaterally  Word discrimination 100% at 55 dB bilaterally         Medications:     Current Outpatient Rx   Medication Sig Dispense Refill     cholecalciferol (VITAMIN D3) 125 mcg (5000 units) capsule Take 1 capsule (125 mcg) by mouth 2 times daily 30 capsule 3     Elemental iron 65 mg Vitamin C 125 mg (VITRON C)  MG TABS tablet Take 1 tablet by mouth daily 90 tablet 1     losartan (COZAAR) 25 MG tablet TAKE 1 TABLET DAILY 90 tablet 2     magnesium oxide 400 MG tablet Take 1 tablet (400 mg) by mouth daily 90 tablet 3     medroxyPROGESTERone (PROVERA) 10 MG tablet TAKE 1 TABLET DAILY 90 tablet 3     metFORMIN (GLUCOPHAGE XR) 500 MG 24 hr tablet TAKE 2 TABLETS TWICE A DAY WITH MEALS 120 tablet 11     metoprolol succinate ER (TOPROL XL) 25 MG 24 hr tablet TAKE 1 TABLET DAILY 90 tablet 2     potassium chloride ER (K-TAB/KLOR-CON) 10 MEQ CR tablet TAKE 1 TABLET DAILY 90 tablet 2     sertraline (ZOLOFT) 100 MG tablet TAKE 1 AND ONE-HALF TABLETS (150MG) BY MOUTH DAILY 135 tablet 3     ACETAMINOPHEN EXTRA STRENGTH 500 MG tablet  (Patient not taking: Reported on 2/21/2024)       fluticasone (FLONASE) 50 MCG/ACT nasal spray Spray 1 spray into both nostrils daily 16 g 1     Semaglutide-Weight Management (WEGOVY) 2.4 MG/0.75ML pen Inject 2.4 mg subcutaneously once a week 3 mL 3     WEGOVY 1 MG/0.5ML pen INJECT 1MG SUBCUTANEOUSLY EVERY WEEK 4 mL 0     WEGOVY 1.7 MG/0.75ML pen INJECT 1.7 MG SUBCUTANEOUSLY ONCE A WEEK 4 mL 0            Allergies:     Allergies: Iodine          Past Medical History:     Past Medical History:   Diagnosis Date     Benign essential hypertension 11/30/2015     Cardiomyopathy, unspecified (H)     right and left cath to be done; [place] -Dr Shafer; etiology? TROY, Vyvanse, other     Depressive disorder      GERD (gastroesophageal reflux diseae) 07/14/2011     Heart failure with reduced ejection fraction (H)     echo 1/31/2022; [facility]; EF 40-45%; mild  "hypokinesis LV; trace TR     Major depressive affective disorder, recurrent episode, unspecified 2011     Obesity  2011     Primary osteoarthritis of both hips     Dr De Leon; [facility]; planning total hip arthroplasty; goal weight first 250     Vitamin D deficiency 2015            Past Surgical History:     Past Surgical History:   Procedure Laterality Date     ABDOMEN SURGERY  2005    I have had 2 c sections     ANGIOGRAM  2020    LHC - normal RCA/LCX; LAD luminal irregularities; Dr Hair; [facility]; plan cMRI     bilateral tubal ligation        section  ,         COLONOSCOPY N/A 2022    Procedure: Colonoscopy with polypectomy;  Surgeon: Jovanni Brown MD;  Location: [facility]     COLONOSCOPY       ENT SURGERY      Got my tonsils out     GYN SURGERY  2005         oligohydramnios       ORTHOPEDIC SURGERY  22 & 3-28-22    Total hip replacement left and right     Tonsillectomy       TOTAL HIP ARTHROPLASTY Right 2022    Dr De Leon     TOTAL HIP ARTHROPLASTY Left 2022    Dr De Leon     VASCULAR SURGERY  2022    Cath of both left and right side            Social History:     Social History     Tobacco Use     Smoking status: Former     Current packs/day: 0.00     Types: Cigarettes     Quit date: 2003     Years since quittin.6     Passive exposure: Past     Smokeless tobacco: Never     Tobacco comments:     Tried to quit   Vaping Use     Vaping status: Never Used   Substance Use Topics     Alcohol use: Yes     Comment: hasnt drank in a year     Drug use: No            Review of Systems:     ROS: See HPI         Physical Exam:     /86 (BP Location: Right arm, Patient Position: Sitting, Cuff Size: Adult Large)   Pulse 70   Temp 97.9  F (36.6  C) (Tympanic)   Resp 18   Ht 1.715 m (5' 7.52\")   Wt 130.5 kg (287 lb 11.2 oz)   SpO2 97%   BMI 44.37 kg/m      General - The patient is well nourished and " well developed, and appears to have good nutritional status.  Alert and oriented to person and place, answers questions and cooperates with examination appropriately.   Head and Face - Normocephalic and atraumatic, with no gross asymmetry noted.  The facial nerve is intact, with strong symmetric movements.  Voice and Breathing - The patient was breathing comfortably without the use of accessory muscles. There was no wheezing, stridor. The patients voice was clear and strong, and had appropriate pitch and quality.  Ears - External ear normal. Canals are patent. Right tympanic membrane is intact without effusion, retraction or mass. Left tympanic membrane is intact without effusion, retraction or mass.  Eyes - Extraocular movements intact, sclera were not icteric or injected, conjunctiva were pink and moist.  Mouth - Examination of the oral cavity showed pink, healthy oral mucosa. Dentition in good condition. No lesions or ulcerations noted. The tongue was mobile and midline.   TMJ - left tender to palpation with masseter muscle tenderness.    Throat - The walls of the oropharynx were smooth, pink, moist, symmetric, and had no lesions or ulcerations.  The tonsillar pillars and soft palate were symmetric. The uvula was midline on elevation.    Neck - Normal ROM, no palpable lymphadenopathy.  Palpation of the thyroid was soft and smooth, with no nodules or goiter appreciated.  The trachea was mobile and midline.  Nose - External contour is symmetric, no gross deflection or scars.  Nasal mucosa is pink and moist with no abnormal mucus.  The septum and turbinates were evaluated with nasal speculum, no polyps, masses, or purulence noted on examination.    To evaluate the nose and sinuses, I performed rigid nasal endoscopy.  I sprayed both nares with 2 sprays lidocaine and neosynephrine.     I began with the RIGHT side using a 0 degree rigid nasal endoscope, and then similarly examined the LEFT side     Findings:   Inferior  turbinates:  grossly normal  Middle turbinate and middle meatus: normal  The superior meatus is examined and unremarkable  SER examined and unremarkable  Mucosa is healthy throughout,  no polyps nor polypoid degeneration  Nasopharynx clear, ET patent, no edema  The patient tolerated the procedure well              Assessment and Plan:       ICD-10-CM    1. TMJ tenderness, left  M26.622       2. Sensation of plugged ear on left side  H93.8X2       3. Normal hearing noted on examination  Z01.10       4. Normal ear exam  Z01.10         Rest the muscles of the jaw by eating soft foods, avoid chewing gum, avoid clenching or tensing the jaw  Apply moist warm heat to the affected jaw for 30 minutes at least twice daily  Physical therapy can be helpful as well  OTC (such as ibuprofen or aleve) NSAIDs per package directions  Oral splints or mouth guards as sometimes necessary    Follow up in 1-2 months for recheck    Sydney HICKS  [facility] ENT      Again, thank you for allowing me to participate in the care of your patient.        Sincerely,        Sydney Durbin NP

## 2024-07-29 NOTE — PROGRESS NOTES
Audiology Evaluation Completed. Please refer SCANNED AUDIOGRAM and/or TYMPANOGRAM for BACKGROUND, RESULTS, RECOMMENDATIONS.      Janet CALDERÓN, [facility]  Audiologist #[id number]

## 2024-08-04 DIAGNOSIS — N92.0 MENORRHAGIA WITH REGULAR CYCLE: ICD-10-CM

## 2024-08-05 RX ORDER — MEDROXYPROGESTERONE ACETATE 10 MG
10 TABLET ORAL DAILY
Qty: 90 TABLET | Refills: 3 | Status: SHIPPED | OUTPATIENT
Start: 2024-08-05

## 2024-08-05 NOTE — TELEPHONE ENCOUNTER
Provera 10 MG      Last Written Prescription Date:  08/29/23  Last Fill Quantity: 90,   # refills: 3  Last Office Visit: 02/21/24  Future Office visit:    Next 5 appointments (look out 90 days)      Sep 18, 2024 8:00 AM  (Arrive by 7:45 AM)  Return Visit with Sydney Durbin NP  [facility] ([facility] ) [address]  [place] [address]  [phone]             Routing refill request to provider for review/approval because:  Drug not on the [facility], [facility] or [facility] refill protocol or controlled substance

## 2024-11-23 ENCOUNTER — HEALTH MAINTENANCE LETTER (OUTPATIENT)
Age: 49
End: 2024-11-23

## 2025-01-07 ENCOUNTER — OFFICE VISIT (OUTPATIENT)
Dept: FAMILY MEDICINE | Facility: OTHER | Age: 50
End: 2025-01-07
Attending: FAMILY MEDICINE
Payer: COMMERCIAL

## 2025-01-07 VITALS
SYSTOLIC BLOOD PRESSURE: 127 MMHG | HEIGHT: 68 IN | HEART RATE: 70 BPM | TEMPERATURE: 98.4 F | OXYGEN SATURATION: 98 % | WEIGHT: 293 LBS | RESPIRATION RATE: 16 BRPM | BODY MASS INDEX: 44.41 KG/M2 | DIASTOLIC BLOOD PRESSURE: 84 MMHG

## 2025-01-07 DIAGNOSIS — E66.01 CLASS 3 SEVERE OBESITY DUE TO EXCESS CALORIES WITH SERIOUS COMORBIDITY AND BODY MASS INDEX (BMI) OF 45.0 TO 49.9 IN ADULT (H): ICD-10-CM

## 2025-01-07 DIAGNOSIS — E88.810 INSULIN RESISTANCE SYNDROME: ICD-10-CM

## 2025-01-07 DIAGNOSIS — E66.813 CLASS 3 SEVERE OBESITY DUE TO EXCESS CALORIES WITH SERIOUS COMORBIDITY AND BODY MASS INDEX (BMI) OF 45.0 TO 49.9 IN ADULT (H): ICD-10-CM

## 2025-01-07 DIAGNOSIS — E66.01 MORBID OBESITY (H): Primary | ICD-10-CM

## 2025-01-07 DIAGNOSIS — G47.33 OBSTRUCTIVE SLEEP APNEA SYNDROME: ICD-10-CM

## 2025-01-07 RX ORDER — TIRZEPATIDE 2.5 MG/.5ML
2.5 INJECTION, SOLUTION SUBCUTANEOUS
Qty: 2 ML | Refills: 0 | Status: SHIPPED | OUTPATIENT
Start: 2025-01-07 | End: 2025-01-09

## 2025-01-07 RX ORDER — TIRZEPATIDE 5 MG/.5ML
5 INJECTION, SOLUTION SUBCUTANEOUS
Qty: 2 ML | Refills: 2 | Status: SHIPPED | OUTPATIENT
Start: 2025-01-07 | End: 2025-01-09

## 2025-01-07 ASSESSMENT — ANXIETY QUESTIONNAIRES
GAD7 TOTAL SCORE: 4
4. TROUBLE RELAXING: NOT AT ALL
7. FEELING AFRAID AS IF SOMETHING AWFUL MIGHT HAPPEN: SEVERAL DAYS
GAD7 TOTAL SCORE: 4
5. BEING SO RESTLESS THAT IT IS HARD TO SIT STILL: NOT AT ALL
3. WORRYING TOO MUCH ABOUT DIFFERENT THINGS: SEVERAL DAYS
7. FEELING AFRAID AS IF SOMETHING AWFUL MIGHT HAPPEN: SEVERAL DAYS
6. BECOMING EASILY ANNOYED OR IRRITABLE: SEVERAL DAYS
1. FEELING NERVOUS, ANXIOUS, OR ON EDGE: SEVERAL DAYS
8. IF YOU CHECKED OFF ANY PROBLEMS, HOW DIFFICULT HAVE THESE MADE IT FOR YOU TO DO YOUR WORK, TAKE CARE OF THINGS AT HOME, OR GET ALONG WITH OTHER PEOPLE?: SOMEWHAT DIFFICULT
IF YOU CHECKED OFF ANY PROBLEMS ON THIS QUESTIONNAIRE, HOW DIFFICULT HAVE THESE PROBLEMS MADE IT FOR YOU TO DO YOUR WORK, TAKE CARE OF THINGS AT HOME, OR GET ALONG WITH OTHER PEOPLE: SOMEWHAT DIFFICULT
2. NOT BEING ABLE TO STOP OR CONTROL WORRYING: NOT AT ALL
GAD7 TOTAL SCORE: 4

## 2025-01-07 ASSESSMENT — PATIENT HEALTH QUESTIONNAIRE - PHQ9
SUM OF ALL RESPONSES TO PHQ QUESTIONS 1-9: 12
SUM OF ALL RESPONSES TO PHQ QUESTIONS 1-9: 12
10. IF YOU CHECKED OFF ANY PROBLEMS, HOW DIFFICULT HAVE THESE PROBLEMS MADE IT FOR YOU TO DO YOUR WORK, TAKE CARE OF THINGS AT HOME, OR GET ALONG WITH OTHER PEOPLE: SOMEWHAT DIFFICULT

## 2025-01-07 ASSESSMENT — PAIN SCALES - GENERAL: PAINLEVEL_OUTOF10: NO PAIN (0)

## 2025-01-07 NOTE — LETTER
"My Heart Failure Action Plan  Name: Vannessa Gupta   YOB: 1975  Date: 1/7/2025   My doctor:   Aaliyah Suarez [facility] [place] [address] [address]  [facility] [address]  [phone] My Diagnosis:   My Ejection Fraction: No results found for: \"LVEF\"    My Exercise Goal: Start exercise slowly - to begin, do a few minutes of exercise, several times a day. Increase your time and speed [id number] to build tolerance, with a goal of 30 minutes of exercise daily. Steady, slow, and consistent exercise is both safe and healthy. Stop and rest when you feel tired or become short of breath. Do not push yourself on days when you don t feel well.       My Weight Plan:   Wt Readings from Last 2 Encounters:   07/29/24 130.5 kg (287 lb 11.2 oz)   06/04/24 130.5 kg (287 lb 11.2 oz)     Weigh yourself daily using the same scale. If you gain more than 2 pounds in 24 hours or 5 pounds in 7 days. call the clinic    My Diet Goal:     Emergency Room Visits:    Our goal is to improve your quality of life and help you avoid a visit to the emergency room or hospital.  If we work together, we can achieve this goal. But, if you feel you need to call 911 or go to the emergency room, please do so.  If you go to the emergency room, please bring your list of medicines and your daily weight chart with you.    Each day ask yourself:  Is my weight up?  Do I have swelling?  Do I have trouble breathing?  How did I sleep?  Other problems?       GREEN ZONE     Weight gained is no more than 2 pounds a day or 5 pounds a in 7 days.  No swelling in feet, ankles, legs or stomach.  No more swelling than usual.  No more trouble breathing than usual.  No change in my sleep.  No other problems. What should I do?  I am doing fine. I will take my medicine, follow my diet, see my doctor, exercise, and watch for symptoms.           YELLOW ZONE         Weight gain of more than 2 pounds in one day or 5 pounds in 7 days.  New " swelling in ankle, leg, knee or thigh.  Bloating in belly, pants feel tighter.  Swelling in hands or face.  Coughing or trouble breathing while walking or talking.  Harder to breathe last night.  Have trouble sleeping, wake up short of breath.  Unusually tired.  Not eating.  Nausea, vomiting, or diarrhea  Pain in my chest or bad  leg cramps.  Feel weak or dizzy. What should I do?  I need to take action and call my doctor or nurse today.                 RED ZONE         Weight gain of 5 pounds overnight.  Chest pain or pressure that does not go away.  Feel less alert.  Wheezing or have trouble breathing when at rest.  Cannot sleep lying down.  Cannot take my medicines.  Pass out or faint. What should I do?  I need to call my doctor or nurse now!  Call 911 if I have chest pain or cannot breathe.

## 2025-01-07 NOTE — PROGRESS NOTES
"  Assessment & Plan       ICD-10-CM    1. Morbid obesity (H)  E66.01 ZEPBOUND 2.5 MG/0.5ML prefilled pen     ZEPBOUND 5 MG/0.5ML prefilled pen      2. Class 3 severe obesity due to excess calories with serious comorbidity and body mass index (BMI) of 45.0 to 49.9 in adult (H)  E66.813 ZEPBOUND 2.5 MG/0.5ML prefilled pen    Z68.42 ZEPBOUND 5 MG/0.5ML prefilled pen    E66.01       3. Insulin resistance syndrome  E88.810 ZEPBOUND 2.5 MG/0.5ML prefilled pen     ZEPBOUND 5 MG/0.5ML prefilled pen      4. Obstructive sleep apnea syndrome  G47.33 ZEPBOUND 2.5 MG/0.5ML prefilled pen     ZEPBOUND 5 MG/0.5ML prefilled pen      Long discussion   R/B of Zepbound discussed   Low carb diet   Exercise   Follow-up etc  Follow-up made in March.    Start at 2.5mg then go to 5 mg in a month  Symptomatic treatment was discussed along when patient should call and/or come back into the clinic or go to ER/Urgent care. All questions answered.   Stay on Metformin             BMI  Estimated body mass index is 49.5 kg/m  as calculated from the following:    Height as of this encounter: 1.715 m (5' 7.52\").    Weight as of this encounter: 145.6 kg (321 lb).             No follow-ups on file.    Chilo Hurd is a 49 year old, presenting for the following health issues:  Weight Problem        1/7/2025     1:55 PM   Additional Questions   Roomed by Teresa Gaspar   Accompanied by None         1/7/2025     1:55 PM   Patient Reported Additional Medications   Patient reports taking the following new medications None     History of Present Illness       Mental Health Follow-up:  Patient presents to follow-up on Anxiety.    Patient's anxiety since last visit has been:  Medium  The patient is having other symptoms associated with anxiety.  Any significant life events: job concerns, financial concerns and grief or loss  Patient is not feeling anxious or having panic attacks.  Patient has no concerns about alcohol or drug use.    Diabetes:   She " "presents for follow up of diabetes.    She is not checking blood glucose.        She is concerned about other.   She is having excessive thirst and weight gain.            Hypertension: She presents for follow up of hypertension.  She does not check blood pressure  regularly outside of the clinic. Outpatient blood pressures have not been over 140/90. She does not follow a low salt diet.     She eats 0-1 servings of fruits and vegetables daily.She consumes 1 sweetened beverage(s) daily.She exercises with enough effort to increase her heart rate 9 or less minutes per day.  She exercises with enough effort to increase her heart rate 3 or less days per week. She is missing 1 dose(s) of medications per week.       Concern - Weight   Onset: ongoing   Description: patient reports to clinic to discuss weight loss medication, patient was previously on Wegovy but stopped after about 5 months ; patient is interested in trying Zepbound   Accompanying Signs & Symptoms: n/a  Previous history of similar problem: patient was previously on a weight loss medication; interested in starting one again- patient reports she is interested in Zepbound   Therapies tried and outcome: Wegovy - patient reports it did work for her     Did use Wegovy - used for 5 months   Hard time getting it and  got ill - stopped it     DR ARANA pt  Previous notes and hx reviewed  Wt way up from last time       Review of Systems  Constitutional, HEENT, cardiovascular, pulmonary, gi and gu systems are negative, except as otherwise noted.      Objective    /84 (BP Location: Right arm, Patient Position: Sitting, Cuff Size: Adult Large)   Pulse 70   Temp 98.4  F (36.9  C) (Tympanic)   Resp 16   Ht 1.715 m (5' 7.52\")   Wt 145.6 kg (321 lb)   SpO2 98%   BMI 49.50 kg/m    Body mass index is 49.5 kg/m .  Physical Exam   GENERAL: alert and no distress  PSYCH: mentation appears normal, affect normal/bright            Signed Electronically by: Salvador MATAMOROS" MD Bang    .undefined[^^

## 2025-01-07 NOTE — LETTER
My Depression Action Plan  Name: Vannessa Gupta   Date of Birth 1975  Date: 1/7/2025    My doctor: Aaliyah Suarez   My clinic: [facility] [place]  [place] [address]  [phone]            GREEN    ZONE   Good Control    What it looks like:   Things are going generally well. You have normal ups and downs. You may even feel depressed from time to time, but bad moods usually last less than a day.   What you need to do:  Continue to care for yourself (see self care plan)  Check your depression survival kit and update it as needed  Follow your physician s recommendations including any medication.  Do not stop taking medication unless you consult with your physician first.             YELLOW         ZONE Getting Worse    What it looks like:   Depression is starting to interfere with your life.   It may be hard to get out of bed; you may be starting to isolate yourself from others.  Symptoms of depression are starting to last most all day and this has happened for several days.   You may have suicidal thoughts but they are not constant.   What you need to do:     Call your care team. Your response to treatment will improve if you keep your care team informed of your progress. Yellow periods are signs an adjustment may need to be made.     Continue your self-care.  Just get dressed and ready for the day.  Don't give yourself time to talk yourself out of it.    Talk to someone in your support network.    Open up your Depression Self-Care Plan/Wellness Kit.             RED    ZONE Medical Alert - Get Help    What it looks like:   Depression is seriously interfering with your life.   You may experience these or other symptoms: You can t get out of bed most days, can t work or engage in other necessary activities, you have trouble taking care of basic hygiene, or basic responsibilities, thoughts of suicide or death that will not go away, self-injurious behavior.     What  you need to do:  Call your care team and request a same-day appointment. If they are not available (weekends or after hours) call your local crisis line, emergency room or 911.          Depression Self-Care Plan / Wellness Kit    Many people find that medication and therapy are helpful treatments for managing depression. In addition, making small changes to your everyday life can help to boost your mood and improve your wellbeing. Below are some tips for you to consider. Be sure to talk with your medical provider and/or behavioral health consultant if your symptoms are worsening or not improving.     Sleep   Sleep hygiene  means all of the habits that support good, restful sleep. It includes maintaining a consistent bedtime and wake time, using your bedroom only for sleeping or sex, and keeping the bedroom dark and free of distractions like a computer, smartphone, or television.     Develop a Healthy Routine  Maintain good hygiene. Get out of bed in the morning, make your bed, brush your teeth, take a shower, and get dressed. Don t spend too much time viewing media that makes you feel stressed. Find time to relax each day.    Exercise  Get some form of exercise every day. This will help reduce pain and release endorphins, the  feel good  chemicals in your brain. It can be as simple as just going for a walk or doing some gardening, anything that will get you moving.      Diet  Strive to eat healthy foods, including fruits and vegetables. Drink plenty of water. Avoid excessive sugar, caffeine, alcohol, and other mood-altering substances.     Stay Connected with Others  Stay in touch with friends and family members.    Manage Your Mood  Try deep breathing, massage therapy, biofeedback, or meditation. Take part in fun activities when you can. Try to find something to smile about each day.     Psychotherapy  Be open to working with a therapist if your provider recommends it.     Medication  Be sure to take your  medication as prescribed. Most anti-depressants need to be taken every day. It usually takes several weeks for medications to work. Not all medicines work for all people. It is important to follow-up with your provider to make sure you have a treatment plan that is working for you. Do not stop your medication abruptly without first discussing it with your provider.    Crisis Resources   These hotlines are for both adults and children. They and are open 24 hours a day, 7 days a week unless noted otherwise.    National Suicide Prevention Lifeline   988 or 0-927-627-SNPV (2156)    Crisis Text Line    www.crisistextline.org  Text HOME to 985660 from anywhere in the [place], anytime, about any type of crisis. A live, trained crisis counselor will receive the text and respond quickly.    [employer] Lifeline for LGBTQ Youth  A national crisis intervention and suicide lifeline for LGBTQ youth under 25. Provides a safe place to talk without judgement. Call 1-678.988.1410; text START to 399698 or visit www.[employer].org to talk to a trained counselor.    For [facility] crisis numbers, visit the [facility] website at:  https://[place].gov/dhs/people-we-serve/adults/health-care/mental-health/resources/crisis-contacts.jsp

## 2025-01-08 ENCOUNTER — MYC MEDICAL ADVICE (OUTPATIENT)
Dept: FAMILY MEDICINE | Facility: OTHER | Age: 50
End: 2025-01-08

## 2025-01-08 DIAGNOSIS — E66.01 CLASS 3 SEVERE OBESITY DUE TO EXCESS CALORIES WITH SERIOUS COMORBIDITY AND BODY MASS INDEX (BMI) OF 45.0 TO 49.9 IN ADULT (H): ICD-10-CM

## 2025-01-08 DIAGNOSIS — E66.01 MORBID OBESITY (H): ICD-10-CM

## 2025-01-08 DIAGNOSIS — E66.813 CLASS 3 SEVERE OBESITY DUE TO EXCESS CALORIES WITH SERIOUS COMORBIDITY AND BODY MASS INDEX (BMI) OF 45.0 TO 49.9 IN ADULT (H): ICD-10-CM

## 2025-01-08 DIAGNOSIS — E88.810 INSULIN RESISTANCE SYNDROME: ICD-10-CM

## 2025-01-08 DIAGNOSIS — G47.33 OBSTRUCTIVE SLEEP APNEA SYNDROME: ICD-10-CM

## 2025-01-09 RX ORDER — TIRZEPATIDE 5 MG/.5ML
5 INJECTION, SOLUTION SUBCUTANEOUS
Qty: 2 ML | Refills: 2 | Status: SHIPPED | OUTPATIENT
Start: 2025-01-09

## 2025-01-09 RX ORDER — TIRZEPATIDE 2.5 MG/.5ML
2.5 INJECTION, SOLUTION SUBCUTANEOUS
Qty: 2 ML | Refills: 0 | Status: SHIPPED | OUTPATIENT
Start: 2025-01-09

## 2025-01-09 NOTE — TELEPHONE ENCOUNTER
Writer called [facility]   They didn't receive scripts signed by provider we need to send again. Pended rx     Heidi Urena RN

## 2025-01-22 ENCOUNTER — MYC MEDICAL ADVICE (OUTPATIENT)
Dept: FAMILY MEDICINE | Facility: OTHER | Age: 50
End: 2025-01-22

## 2025-01-22 ENCOUNTER — MYC REFILL (OUTPATIENT)
Dept: FAMILY MEDICINE | Facility: OTHER | Age: 50
End: 2025-01-22

## 2025-01-22 DIAGNOSIS — E66.01 MORBID OBESITY (H): ICD-10-CM

## 2025-01-22 DIAGNOSIS — E88.810 INSULIN RESISTANCE SYNDROME: ICD-10-CM

## 2025-01-22 DIAGNOSIS — G47.33 OBSTRUCTIVE SLEEP APNEA SYNDROME: ICD-10-CM

## 2025-01-22 DIAGNOSIS — E66.813 CLASS 3 SEVERE OBESITY DUE TO EXCESS CALORIES WITH SERIOUS COMORBIDITY AND BODY MASS INDEX (BMI) OF 45.0 TO 49.9 IN ADULT (H): ICD-10-CM

## 2025-01-22 DIAGNOSIS — E66.01 CLASS 3 SEVERE OBESITY DUE TO EXCESS CALORIES WITH SERIOUS COMORBIDITY AND BODY MASS INDEX (BMI) OF 45.0 TO 49.9 IN ADULT (H): ICD-10-CM

## 2025-01-22 RX ORDER — TIRZEPATIDE 5 MG/.5ML
5 INJECTION, SOLUTION SUBCUTANEOUS
Qty: 2 ML | Refills: 0 | Status: SHIPPED | OUTPATIENT
Start: 2025-01-22

## 2025-01-22 RX ORDER — TIRZEPATIDE 2.5 MG/.5ML
2.5 INJECTION, SOLUTION SUBCUTANEOUS
Qty: 2 ML | Refills: 0 | OUTPATIENT
Start: 2025-01-22

## 2025-01-22 NOTE — TELEPHONE ENCOUNTER
ZEPBOUND      Last Written Prescription Date:  1-9-25  Last Fill Quantity: 2ML,   # refills: 0  Last Office Visit: 1-7-2025  Future Office visit:    Next 5 appointments (look out 90 days)      Mar 17, 2025 4:30 PM  (Arrive by 4:15 PM)  Provider Visit with Aaliyah Suarez MD  [facility] [place] ([facility] [place] ) [address]  [place] [address]  [phone]             Routing refill request to provider for review/approval because:  Drug not on the [facility], [facility] or [facility] refill protocol or controlled substance

## 2025-01-22 NOTE — TELEPHONE ENCOUNTER
Zepbound 5 mg was signed by Dr. Cuenca on 1/9/25. Pharmacy is needing new script signed by different provider. Pended.

## 2025-03-06 ENCOUNTER — TELEPHONE (OUTPATIENT)
Dept: FAMILY MEDICINE | Facility: OTHER | Age: 50
End: 2025-03-06

## 2025-03-06 NOTE — TELEPHONE ENCOUNTER
Pt notified of denial. She states she has a new plan as of 2/1/25. Is going to check her plan for formulary alternatives and get back to us.

## 2025-03-06 NOTE — TELEPHONE ENCOUNTER
Received a PA request from Michael for ZEPBOUND 5 MG/0.5ML prefilled pen. Submitted on CMM and got the following message: ZEPBOUND 5 MG/0.5ML PEN INJCTR is not covered for this Member. For formulary alternatives, please view the Member's corresponding formulary at [email or web address] or call us at [phone].    Looks like this is different ins than what was used for the approval in Jan for the 2.5mg. Maybe have her check with the pharmacy?

## 2025-03-25 ENCOUNTER — APPOINTMENT (OUTPATIENT)
Dept: GENERAL RADIOLOGY | Facility: HOSPITAL | Age: 50
End: 2025-03-25
Payer: COMMERCIAL

## 2025-03-25 ENCOUNTER — HOSPITAL ENCOUNTER (EMERGENCY)
Facility: HOSPITAL | Age: 50
Discharge: HOME | End: 2025-03-25
Payer: COMMERCIAL

## 2025-03-25 VITALS
HEART RATE: 81 BPM | DIASTOLIC BLOOD PRESSURE: 81 MMHG | TEMPERATURE: 98.1 F | RESPIRATION RATE: 18 BRPM | OXYGEN SATURATION: 99 % | SYSTOLIC BLOOD PRESSURE: 120 MMHG

## 2025-03-25 DIAGNOSIS — M17.11 OSTEOARTHRITIS OF RIGHT KNEE: ICD-10-CM

## 2025-03-25 DIAGNOSIS — R21 RASH: ICD-10-CM

## 2025-03-25 DIAGNOSIS — S83.91XA SPRAIN OF RIGHT KNEE, UNSPECIFIED LIGAMENT, INITIAL ENCOUNTER: ICD-10-CM

## 2025-03-25 PROCEDURE — 96372 THER/PROPH/DIAG INJ SC/IM: CPT

## 2025-03-25 PROCEDURE — 250N000011 HC RX IP 250 OP 636

## 2025-03-25 PROCEDURE — 99213 OFFICE O/P EST LOW 20 MIN: CPT

## 2025-03-25 PROCEDURE — G0463 HOSPITAL OUTPT CLINIC VISIT: HCPCS | Mod: 25

## 2025-03-25 PROCEDURE — 73562 X-RAY EXAM OF KNEE 3: CPT | Mod: RT

## 2025-03-25 RX ORDER — PREDNISONE 20 MG/1
TABLET ORAL
Qty: 10 TABLET | Refills: 0 | Status: SHIPPED | OUTPATIENT
Start: 2025-03-25

## 2025-03-25 RX ORDER — KETOROLAC TROMETHAMINE 30 MG/ML
30 INJECTION, SOLUTION INTRAMUSCULAR; INTRAVENOUS ONCE
Status: COMPLETED | OUTPATIENT
Start: 2025-03-25 | End: 2025-03-25

## 2025-03-25 RX ORDER — PREGABALIN 100 MG/1
100 CAPSULE ORAL
COMMUNITY
Start: 2025-03-21 | End: 2025-04-04

## 2025-03-25 RX ORDER — TRAMADOL HYDROCHLORIDE 50 MG/1
1 TABLET ORAL EVERY 6 HOURS PRN
COMMUNITY
Start: 2025-03-11

## 2025-03-25 RX ADMIN — KETOROLAC TROMETHAMINE 30 MG: 30 INJECTION, SOLUTION INTRAMUSCULAR at 08:47

## 2025-03-25 ASSESSMENT — COLUMBIA-SUICIDE SEVERITY RATING SCALE - C-SSRS
2. HAVE YOU ACTUALLY HAD ANY THOUGHTS OF KILLING YOURSELF IN THE PAST MONTH?: NO
1. IN THE PAST MONTH, HAVE YOU WISHED YOU WERE DEAD OR WISHED YOU COULD GO TO SLEEP AND NOT WAKE UP?: NO
6. HAVE YOU EVER DONE ANYTHING, STARTED TO DO ANYTHING, OR PREPARED TO DO ANYTHING TO END YOUR LIFE?: NO

## 2025-03-25 ASSESSMENT — ENCOUNTER SYMPTOMS
FEVER: 0
COLOR CHANGE: 1
NUMBNESS: 1
CHILLS: 0
ACTIVITY CHANGE: 1
ARTHRALGIAS: 1
JOINT SWELLING: 1

## 2025-03-25 ASSESSMENT — ACTIVITIES OF DAILY LIVING (ADL): ADLS_ACUITY_SCORE: 41

## 2025-03-25 NOTE — ED TRIAGE NOTES
Pt presents today with c/o right knee pain for over 2 weeks. Had XR done 2 weeks ago. Has MRI scheduled on MRI. Unable to stand on it without pain. Has been taking tramadol and lyrica.

## 2025-03-25 NOTE — DISCHARGE INSTRUCTIONS
I spoke w/ pt's Mother.    Discussed that FSH/LH and PRL are in normal range.  E2 is low.  Either just time in cycle or ovaries are not putting out much E2 (this would explain lack of menses).  Since our last visit pt did actually get a menses.      Pt's mother asked if we should restart OCP.  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  Secondly, while we could start daily pill to suppress, I don't know if she is going to continue cycling regularly or if this was a more rare event.  Could obs for a while to see if she's cycling and then suppress if so desired.  She is open to this first.  However, Airam is going to move to a Support Home and pt's mother would like her on OCP at that point to avoid any chances of unplanned pregnancy.  I advised that seems reasonable to me and she can reach out to me and we can rx that when she needs. Prednisone once daily for 5 days. Do not take ibuprofen with this medication.   Tylenol as directed if needed.   Tramadol and lyrica as prescribed.   Follow up in the clinic.   Follow up with orthopedics.   Return with any new or concerning symptoms as discussed.

## 2025-03-25 NOTE — ED PROVIDER NOTES
History     Chief Complaint   Patient presents with    Knee Pain     HPI  Vannessa Gupta is a 49 year old female who presents to the urgent care with complaints of increased right knee pain since yesterday. Has been unable to walk on knee due to pain. Unsure if she twisted while on the stairs yesterday. She denies fevers, chills, and recent illness. Has seen an orthopedic surgeon and has an MRI scheduled for 3/30. She has been taking ibuprofen, tramadol, and lyrica with minimal relief. She has been icing.     Allergies:  Allergies   Allergen Reactions    Iodine Other (See Comments) and Rash     (shrimp) lips swollen       Problem List:    Patient Active Problem List    Diagnosis Date Noted    Other specified anemias 04/20/2022     Priority: Medium    Cardiomyopathy, unspecified type (H) 04/11/2022     Priority: Medium     Formatting of this note might be different from the original.  Added automatically from request for surgery [id number]      Obstructive sleep apnea syndrome 04/11/2022     Priority: Medium     Formatting of this note might be different from the original.  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  Added automatically from request for surgery [id number]    Formatting of this note might be different from the original.  Added automatically from request for surgery [id number]      Opioid use, unspecified, uncomplicated 01/06/2022     Priority: Medium    Chronic pain of both knees 10/26/2021     Priority: Medium    Knee joint stiffness, bilateral 10/26/2021     Priority: Medium    Muscle weakness 10/26/2021     Priority: Medium    Premenopausal patient 09/30/2021     Priority: Medium    Osteoarthritis of both hips 09/17/2020     Priority: Medium     Menorrhagia with regular cycle--US normal--PROVERA daily---10/2019 10/23/2019     Priority: Medium    Morbid obesity (H) 10/10/2019     Priority: Medium    Insulin resistance syndrome 2018     Priority: Medium    Benign essential hypertension 2015     Priority: Medium    Vitamin D deficiency 2015     Priority: Medium    Major depressive disorder, recurrent episode 2011     Priority: Medium     Problem list name updated by automated process. Provider to review      GERD (gastroesophageal reflux diseae) 2011     Priority: Medium        Past Medical History:    Past Medical History:   Diagnosis Date    Benign essential hypertension 2015    Cardiomyopathy, unspecified (H)     Depressive disorder     GERD (gastroesophageal reflux diseae) 2011    Heart failure with reduced ejection fraction (H)     Major depressive affective disorder, recurrent episode, unspecified 2011    Obesity  2011    Primary osteoarthritis of both hips     Vitamin D deficiency 2015       Past Surgical History:    Past Surgical History:   Procedure Laterality Date    ABDOMEN SURGERY  2005    I have had 2 c sections    ANGIOGRAM  2020    LHC - normal RCA/LCX; LAD luminal irregularities; Dr Hair; [facility]; plan cMRI    bilateral tubal ligation       section  ,        COLONOSCOPY N/A 2022    Procedure: Colonoscopy with polypectomy;  Surgeon: Jovanni Brown MD;  Location: [facility] OR    COLONOSCOPY      ENT SURGERY      Got my tonsils out    GYN SURGERY  2005        oligohydramnios      ORTHOPEDIC SURGERY  22 & 3-28-22    Total hip replacement left and right    Tonsillectomy      TOTAL HIP ARTHROPLASTY Right 2022    Dr De Leon    TOTAL HIP ARTHROPLASTY Left 2022    Dr De Leon    VASCULAR SURGERY  2022    Cath of both left and right side       Family History:    Family History   Problem Relation Age of  Onset    Cancer Mother         Cervical    Depression Mother     Anxiety Disorder Mother     Osteoporosis Mother     Depression Father     Diabetes Father     Hypertension Father     Other - See Comments Father         Cholecystectomy/Rheumatoid arthritis    Heart Disease Father     LUNG DISEASE Father     Liver Disease Father     Coronary Artery Disease Father     Cerebrovascular Disease Father     Anxiety Disorder Father     Osteoporosis Father     Other - See Comments Sister         Endometriosis    Anxiety Disorder Sister     Fibromyalgia Other         mom and sister    Asthma No family hx of     Thyroid Disease No family hx of        Social History:  Marital Status:   [2]  Social History     Tobacco Use    Smoking status: Former     Current packs/day: 0.00     Types: Cigarettes     Quit date: 2003     Years since quittin.3     Passive exposure: Past    Smokeless tobacco: Never    Tobacco comments:     Tried to quit   Vaping Use    Vaping status: Never Used   Substance Use Topics    Alcohol use: Yes     Comment: hasnt drank in a year    Drug use: No        Medications:    cholecalciferol (VITAMIN D3) 125 mcg (5000 units) capsule  Elemental iron 65 mg Vitamin C 125 mg (VITRON C)  MG TABS tablet  losartan (COZAAR) 25 MG tablet  magnesium oxide 400 MG tablet  medroxyPROGESTERone (PROVERA) 10 MG tablet  metFORMIN (GLUCOPHAGE XR) 500 MG 24 hr tablet  metoprolol succinate ER (TOPROL XL) 25 MG 24 hr tablet  potassium chloride ER (K-TAB/KLOR-CON) 10 MEQ CR tablet  pregabalin (LYRICA) 100 MG capsule  sertraline (ZOLOFT) 100 MG tablet  traMADol (ULTRAM) 50 MG tablet  predniSONE (DELTASONE) 20 MG tablet  ZEPBOUND 2.5 MG/0.5ML prefilled pen  ZEPBOUND 5 MG/0.5ML prefilled pen          Review of Systems   Constitutional:  Positive for activity change. Negative for chills and fever.   Musculoskeletal:  Positive for arthralgias and joint swelling.   Skin:  Positive for color change.   Neurological:   Positive for numbness (some intermittent tingling in toes).   All other systems reviewed and are negative.      Physical Exam   BP: 120/81  Pulse: 81  Temp: 98.1  F (36.7  C)  Resp: 18  SpO2: 99 %      Physical Exam  Vitals and nursing note reviewed.   Constitutional:       General: She is not in acute distress.     Appearance: Normal appearance. She is not ill-appearing or toxic-appearing.   Cardiovascular:      Pulses: Normal pulses.   Musculoskeletal:         General: Swelling and tenderness present. No deformity.      Right knee: Swelling present. No crepitus. Decreased range of motion. Tenderness present over the medial joint line.      Right lower leg: No edema.        Legs:    Skin:     General: Skin is warm and dry.      Capillary Refill: Capillary refill takes less than 2 seconds.      Findings: Rash present. No abscess, bruising or erythema. Rash is not crusting, urticarial or vesicular.   Neurological:      General: No focal deficit present.      Mental Status: She is alert and oriented to person, place, and time.   Psychiatric:         Mood and Affect: Mood normal.         Behavior: Behavior normal.         Thought Content: Thought content normal.         Judgment: Judgment normal.         ED Course        Procedures           Results for orders placed or performed during the hospital encounter of 03/25/25 (from the past 24 hours)   XR Knee Right 3 Views    Narrative    XR KNEE RIGHT 3 VIEWS    HISTORY: 49 years Female hx of knee pain, increased pain    COMPARISON: 6/23/2021    TECHNIQUE: Right knee 3 views    FINDINGS: There is severe lateral compartment joint space narrowing.  There is moderate to severe medial and patellofemoral compartment  joint space narrowing. There is marginal osteophytic change of the  lateral and patellofemoral compartments, and  Medial compartment of lesser severity.      Impression    IMPRESSION:   .. Advanced osteoarthritic changes. Severity has increased from the  prior  study.    DELLA LAMA MD         SYSTEM ID:  [id number]       Medications   ketorolac (TORADOL) injection 30 mg (30 mg Intramuscular $Given 3/25/25 5947)       Assessments & Plan (with Medical Decision Making)     I have reviewed the nursing notes.    I have reviewed the findings, diagnosis, plan and need for follow up with the patient.  Vannessa Gupta is a 49 year old female who presents to the urgent care with complaints of increased right knee pain since yesterday. Has been unable to walk on knee due to pain. Unsure if she twisted while on the stairs yesterday. She denies fevers, chills, and recent illness. Has seen an orthopedic surgeon and has an MRI scheduled for 3/30. She has been taking ibuprofen, tramadol, and lyrica with minimal relief. She has been icing.     MDM: vital signs normal, afebrile. Non toxic in appearance with no noted distress. Strong pulses to right lower extremity. Generalized tenderness throughout knee. No bruising. Blanchable, flat, non vesicular patches to anterior knee. Less likely shingles, states she has never had varicella. XR of right knee reviewed with advanced osteoarthritic changes. She has an appt scheduled for 3/30. Knee immobilizer and crutches provided. Work note given. Short burst of prednisone prescribed. Supportive measures and strict return precautions discussed. She is in agreement with plan.      (M17.11) Osteoarthritis of right knee, (S83.91XA) Sprain of right knee, unspecified ligament, initial encounter, (R21) Rash  Plan: Ankle/Knee Bracing Supplies Order Knee         Immobilizer; Right, Crutches Order for DME -         ONLY FOR DME  Prednisone once daily for 5 days. Do not take ibuprofen with this medication.   Tylenol as directed if needed.   Tramadol and lyrica as prescribed.   Follow up in the clinic.   Follow up with orthopedics.   Return with any new or concerning symptoms as discussed.       New Prescriptions    PREDNISONE (DELTASONE) 20 MG TABLET     Take two tablets (= 40mg) each day for 5 (five) days       Final diagnoses:   Osteoarthritis of right knee   Sprain of right knee, unspecified ligament, initial encounter   Rash       3/25/2025   [facility]       Lisandra Sparks NP  03/25/25 1000

## 2025-03-25 NOTE — Clinical Note
Vannessa Gupta was seen and treated in our emergency department on 3/25/2025.  She may return to work on 04/01/2025.       If you have any questions or concerns, please don't hesitate to call.      Lisandra Sparks, NP

## 2025-05-26 ENCOUNTER — MYC REFILL (OUTPATIENT)
Dept: FAMILY MEDICINE | Facility: OTHER | Age: 50
End: 2025-05-26

## 2025-05-26 DIAGNOSIS — I10 BENIGN ESSENTIAL HYPERTENSION: ICD-10-CM

## 2025-05-26 DIAGNOSIS — N92.0 MENORRHAGIA WITH REGULAR CYCLE: ICD-10-CM

## 2025-05-26 DIAGNOSIS — E83.42 HYPOMAGNESEMIA: ICD-10-CM

## 2025-05-26 DIAGNOSIS — F33.1 MODERATE EPISODE OF RECURRENT MAJOR DEPRESSIVE DISORDER (H): ICD-10-CM

## 2025-05-26 DIAGNOSIS — E61.1 IRON DEFICIENCY: ICD-10-CM

## 2025-05-27 RX ORDER — IRON,CARBONYL/ASCORBIC ACID 65MG-125MG
1 TABLET ORAL DAILY
Qty: 90 TABLET | Refills: 1 | Status: SHIPPED | OUTPATIENT
Start: 2025-05-27

## 2025-05-27 RX ORDER — METOPROLOL SUCCINATE 25 MG/1
25 TABLET, EXTENDED RELEASE ORAL DAILY
Qty: 90 TABLET | Refills: 0 | Status: SHIPPED | OUTPATIENT
Start: 2025-05-27

## 2025-05-27 RX ORDER — SERTRALINE HYDROCHLORIDE 100 MG/1
TABLET, FILM COATED ORAL
Qty: 135 TABLET | Refills: 0 | Status: SHIPPED | OUTPATIENT
Start: 2025-05-27

## 2025-05-27 RX ORDER — MEDROXYPROGESTERONE ACETATE 10 MG
10 TABLET ORAL DAILY
Qty: 90 TABLET | Refills: 0 | Status: SHIPPED | OUTPATIENT
Start: 2025-05-27

## 2025-05-27 RX ORDER — LOSARTAN POTASSIUM 25 MG/1
25 TABLET ORAL DAILY
Qty: 90 TABLET | Refills: 0 | Status: SHIPPED | OUTPATIENT
Start: 2025-05-27

## 2025-05-27 RX ORDER — MAGNESIUM OXIDE 400 MG/1
400 TABLET ORAL DAILY
Qty: 90 TABLET | Refills: 0 | Status: SHIPPED | OUTPATIENT
Start: 2025-05-27

## 2025-05-27 NOTE — TELEPHONE ENCOUNTER
Zoloft 100 MG      Last Written Prescription Date:  10/03/23  Last Fill Quantity: 135 ,   # refills: 3  Last Office Visit: 01/07/25  Future Office visit:    Next 5 appointments (look out 90 days)      Jun 23, 2025 8:15 AM  (Arrive by 8:00 AM)  Adult Preventative Visit with Aaliyah Suarez MD  [facility] ([facility] ) [address]  [facility] [facility] [address]  [phone]             Routing refill request to provider for review/approval because:  SSRIs Protocol Failed    Rerun Protocol (5/26/2025 9:57 PM)    PHQ-9 score less than 5 in past 6 months    Please review last PHQ-9 score.        5/8/2023     8:59 AM 2/20/2024     4:24 PM 1/7/2025     1:47 PM   PHQ-9 SCORE   PHQ-9 Total Score MyChart   8 (Mild depression) 12 (Moderate depression)   PHQ-9 Total Score 0 8 12        Patient-reported       Recent (12 month) or future (90 days) visit with authorizing provider's specialty (provided they have been seen in the past 15 months)      Magnesium Oxide 400 MG      Last Written Prescription Date:  03/15/24  Last Fill Quantity: 90,   # refills: 3  Last Office Visit: 01/07/25  Future Office visit:    Next 5 appointments (look out 90 days)      Jun 23, 2025 8:15 AM  (Arrive by 8:00 AM)  Adult Preventative Visit with Aaliyah Suarez MD  [facility] ([facility] ) [address]  [facility] [address]  [phone]             Routing refill request to provider for review/approval because:  Drug not on the FMG, UMP or  Health refill protocol or controlled substance      Elemental Iron 65 MG    Vitamin C 125 MG  Last Written Prescription Date:  03/15/24  Last Fill Quantity: 90,   # refills: 1  Last Office Visit: 01/07/25  Future Office visit:    Next 5 appointments (look out 90 days)      Jun 23, 2025 8:15 AM  (Arrive by 8:00 AM)  Adult Preventative Visit with Aaliyah Suarez MD  [facility] ([facility]  [place] ) [address] [facility] [address]  [place] [address]  [phone]             Routing refill request to provider for review/approval because:        Toprol XL 25 MG      Last Written Prescription Date:  05/06/24  Last Fill Quantity: 90,   # refills: 2  Last Office Visit: 01/07/25  Future Office visit:    Next 5 appointments (look out 90 days)      Jun 23, 2025 8:15 AM  (Arrive by 8:00 AM)  Adult Preventative Visit with A[facility] Suarez MD  [facility] [address]  [place] [address]  [phone]             Routing refill request to provider for review/approval because:    Beta-Blockers Protocol [id number]/26/2025 09:57 PM   Protocol Details Recent (12 month) or future (90 days) visit with authorizing provider's specialty (provided they have been seen in the past 15 months)              Losartan 25 MG      Last Written Prescription Date:  05/06/24  Last Fill Quantity: 90,   # refills: 2  Last Office Visit: 01/07/25  Future Office visit:    Next 5 appointments (look out 90 days)      Jun 23, 2025 8:15 AM  (Arrive by 8:00 AM)  Adult Preventative Visit with Aaliyah Suarez MD  [facility] ([facility] ) [address]  [place] [address]  [phone]             Routing refill request to provider for review/approval because:  Angiotensin-II Receptors Failed    Rerun Protocol (5/26/2025 9:57 PM)    Has GFR on file in past 12 months and most recent value is normal      Recent (12 month) or future (90 days) visit with authorizing provider's specialty (provided they have been seen in the past 15 months)    The patient must have completed an in-person or virtual visit within the past 12 months or has a future visit scheduled within the next 90 days with the authorizing provider s specialty.  Urgent care and e-visits do not qualify as an office visit for this protocol.    Normal serum potassium on file in past 12 months         Recent Labs   Lab Test 02/21/24  1530   POTASSIUM 4.1         Provera 10 MG      Last Written Prescription Date:  08/05/24  Last Fill Quantity: 90,   # refills: 3  Last Office Visit: 01/07/25  Future Office visit:    Next 5 appointments (look out 90 days)      Jun 23, 2025 8:15 AM  (Arrive by 8:00 AM)  Adult Preventative Visit with Aaliyah Suarez MD  [facility] ([facility] [place] ) [address]  [place] [address]  [phone]             Routing refill request to provider for review/approval because:

## 2025-06-01 ENCOUNTER — HEALTH MAINTENANCE LETTER (OUTPATIENT)
Age: 50
End: 2025-06-01

## 2025-06-02 SDOH — HEALTH STABILITY: PHYSICAL HEALTH: ON AVERAGE, HOW MANY MINUTES DO YOU ENGAGE IN EXERCISE AT THIS LEVEL?: 0 MIN

## 2025-06-02 SDOH — HEALTH STABILITY: PHYSICAL HEALTH: ON AVERAGE, HOW MANY DAYS PER WEEK DO YOU ENGAGE IN MODERATE TO STRENUOUS EXERCISE (LIKE A BRISK WALK)?: 0 DAYS

## 2025-06-02 ASSESSMENT — SOCIAL DETERMINANTS OF HEALTH (SDOH): HOW OFTEN DO YOU GET TOGETHER WITH FRIENDS OR RELATIVES?: ONCE A WEEK

## 2025-06-02 NOTE — PROGRESS NOTES
"Preventive Care Visit  [facility]  Aaliyah Sethi MD, Family Medicine  Jun 5, 2025      Assessment & Plan     Routine general medical examination at a health care facility  Preventative cares reviewed.  Cancer screenings up to date.  Vaccinations updated.  Ongoing follow up - pain management and ortho.  - Iron and iron binding capacity; Future  - Vitamin D Deficiency; Future  - Vitamin B12; Future    Benign essential hypertension  Stable.  Continue Losartan Toprol XL.  - Comprehensive metabolic panel (BMP + Alb, Alk Phos, ALT, AST, Total. Bili, TP); Future  - CBC with platelets and differential; Future    Cardiomyopathy, unspecified type (H)  Managed by cardiology.  Needs to reschedule follow up visit.    Insulin resistance syndrome  - Insulin level; Future  - Hemoglobin A1c; Future    Obstructive sleep apnea syndrome  CPAP    Episode of recurrent major depressive disorder, unspecified depression episode severity  Stable with Zoloft - continue.    Vitamin D deficiency  Lab pending.    Encounter for screening mammogram for breast cancer  Negative today.  - MA Screen Bilateral w/Ata; Future    Lipid screening  - Lipid Profile (Chol, Trig, HDL, LDL calc); Future      BMI  Estimated body mass index is 46.81 kg/m  as calculated from the following:    Height as of this encounter: 1.715 m (5' 7.52\").    Weight as of this encounter: 137.7 kg (303 lb 8 oz).   Weight management plan: Discussed healthy diet and exercise guidelines    Counseling  Appropriate preventive services were addressed with this patient via screening, questionnaire, or discussion as appropriate for fall prevention, nutrition, physical activity, Tobacco-use cessation, social engagement, weight loss and cognition.  Checklist reviewing preventive services available has been given to the patient.  Reviewed patient's diet, addressing concerns and/or questions.   She is at risk for psychosocial distress and has been provided with information to " reduce risk.   The patient's PHQ-9 score is consistent with mild depression. She was provided with information regarding depression.       Follow-up  Return in about 53 weeks (around 6/11/2026) for Annual Wellness Visit.    Chilo Hurd is a 49 year old, presenting for the following:  Physical, Hypertension, Depression, and Anxiety        1/7/2025     1:55 PM   Additional Questions   Roomed by Teresa Gaspar   Accompanied by None          HPI    Vaccines - agreeable to Covid and Hep B vaccine   Mammo - completed this am - negative  Colon 2022 - due 2032.  Pap/hpv 2022 - due 2027  Fasting labs - did not fast today, she is scheduled tomorrow am for fasting labs     Metformin XR - max -   Prior zepbound -   Prior wegovy  Insurance won't cover and GLP1.  Was down to 270s, up to 321 in 1/2025.  Down now to 303.    Provera - daily progesterone - suppressed menses.  Prior GYN consult.    Tramadol and Lyrica - PDMP - Dr De Leon - [facility] - just started.  Bilateral knee injections 5/14/25  Scheduled for nerve block. Will be 3rd time.  Ultimately will need joint replacements.  But need weight down down to 280.  Negative Rheumatoid and panel - 2021.  Pain management - Dr Michelle -     Mom passed from pancreatic cancer.    Cardiology visit needs to be rescheduled - ill - Dr Rae.    Son - football at [employer].  .  Daughter - , second baby on the way.    Hypertension Follow-up -- cozaar 25, toproxl 25  Do you check your blood pressure regularly outside of the clinic? Yes   Are you following a low salt diet? No  Are your blood pressures ever more than 140 on the top number (systolic) OR more   than 90 on the bottom number (diastolic), for example 140/90? No-other than 1 time     BP Readings from Last 2 Encounters:   06/05/25 118/80   03/25/25 120/81     Depression and Anxiety 150 mg zoloft  How are you doing with your depression since your last visit? Improved   How are you doing with your anxiety  since your last visit?  Improved   Are you having other symptoms that might be associated with depression or anxiety? No  Have you had a significant life event? No   Do you have any concerns with your use of alcohol or other drugs? No    Social History     Tobacco Use    Smoking status: Former     Current packs/day: 0.00     Types: Cigarettes     Quit date: 2003     Years since quittin.5     Passive exposure: Past    Smokeless tobacco: Never    Tobacco comments:     Tried to quit   Vaping Use    Vaping status: Never Used   Substance Use Topics    Alcohol use: Yes     Comment: hasnt drank in a year    Drug use: No         2024     4:24 PM 2025     1:47 PM 2025    12:57 PM   PHQ   PHQ-9 Total Score 8 12  9    Q9: Thoughts of better off dead/self-harm past 2 weeks Not at all Not at all Not at all       Patient-reported         2022     9:00 AM 2022     9:36 AM 2025     1:52 PM   NIRAJ-7 SCORE   Total Score  7 (mild anxiety) 4 (minimal anxiety)   Total Score 0 7 4        Patient-reported         2025    12:57 PM   Last PHQ-9   1.  Little interest or pleasure in doing things 1   2.  Feeling down, depressed, or hopeless 0   3.  Trouble falling or staying asleep, or sleeping too much 3   4.  Feeling tired or having little energy 1   5.  Poor appetite or overeating 3   6.  Feeling bad about yourself 1   7.  Trouble concentrating 0   8.  Moving slowly or restless 0   Q9: Thoughts of better off dead/self-harm past 2 weeks 0   PHQ-9 Total Score 9        Patient-reported         2025     1:52 PM   NIRAJ-7    1. Feeling nervous, anxious, or on edge 1   2. Not being able to stop or control worrying 0   3. Worrying too much about different things 1   4. Trouble relaxing 0   5. Being so restless that it is hard to sit still 0   6. Becoming easily annoyed or irritable 1   7. Feeling afraid, as if something awful might happen 1   NIRAJ-7 Total Score 4    If you checked any problems, how  difficult have they made it for you to do your work, take care of things at home, or get along with other people? Somewhat difficult       Patient-reported       Suicide Assessment Five-step Evaluation and Treatment (SAFE-T)      Advance Care Planning    Discussed advance care planning with patient; however, patient declined at this time.        6/2/2025   General Health   How would you rate your overall physical health? (!) POOR   Feel stress (tense, anxious, or unable to sleep) To some extent   (!) STRESS CONCERN      6/2/2025   Nutrition   Three or more servings of calcium each day? Yes   Diet: Regular (no restrictions)   How many servings of fruit and vegetables per day? (!) 0-1   How many sweetened beverages each day? 0-1         6/2/2025   Exercise   Days per week of moderate/strenous exercise 0 days   Average minutes spent exercising at this level 0 min   (!) EXERCISE CONCERN      6/2/2025   Social Factors   Frequency of gathering with friends or relatives Once a week   Worry food won't last until get money to buy more No   Food not last or not have enough money for food? No   Do you have housing? (Housing is defined as stable permanent housing and does not include staying outside in a car, in a tent, in an abandoned building, in an overnight shelter, or couch-surfing.) Yes   Are you worried about losing your housing? No   Lack of transportation? No   Unable to get utilities (heat,electricity)? No         6/2/2025   Dental   Dentist two times every year? Yes         Today's PHQ-9 Score:       6/4/2025    12:57 PM   PHQ-9 SCORE   PHQ-9 Total Score MyChart 9 (Mild depression)   PHQ-9 Total Score 9        Patient-reported           6/2/2025   Substance Use   Alcohol more than 3/day or more than 7/wk No   Do you use any other substances recreationally? No     Social History     Tobacco Use    Smoking status: Former     Current packs/day: 0.00     Types: Cigarettes     Quit date: 11/30/2003     Years since  quittin.5     Passive exposure: Past    Smokeless tobacco: Never    Tobacco comments:     Tried to quit   Vaping Use    Vaping status: Never Used   Substance Use Topics    Alcohol use: Yes     Comment: hasnt drank in a year    Drug use: No           3/17/2023   LAST FHS-7 RESULTS   1st degree relative breast or ovarian cancer No   Any relative bilateral breast cancer No   Any male have breast cancer No   Any ONE woman have BOTH breast AND ovarian cancer No   Any woman with breast cancer before 50yrs No   2 or more relatives with breast AND/OR ovarian cancer No   2 or more relatives with breast AND/OR bowel cancer No        Mammogram Screening - Mammogram every 1-2 years updated in Health Maintenance based on mutual decision making        2025   STI Screening   New sexual partner(s) since last STI/HIV test? No     History of abnormal Pap smear: No - age 30- 64 PAP with HPV every 5 years recommended        Latest Ref Rng & Units 2022    10:17 AM 2017     8:44 AM 2013     3:38 PM   PAP / HPV   PAP  Negative for Intraepithelial Lesion or Malignancy (NILM)      PAP (Historical)   NIL  NIL    HPV 16 DNA Negative Negative  Negative     HPV 18 DNA Negative Negative  Negative     Other HR HPV Negative Negative  Negative       ASCVD Risk   The 10-year ASCVD risk score (Wil DK, et al., 2019) is: 1.7%    Values used to calculate the score:      Age: 49 years      Sex: Female      Is Non- : No      Diabetic: No      Tobacco smoker: No      Systolic Blood Pressure: 118 mmHg      Is BP treated: Yes      HDL Cholesterol: 36 mg/dL      Total Cholesterol: 169 mg/dL        2025   Contraception/Family Planning   Questions about contraception or family planning No        Reviewed and updated as needed this visit by Provider                    Past Medical History:   Diagnosis Date    Benign essential hypertension 2015    Cancer (H) Dec.26, 2024    Liver and pancreas  "Mom    Cardiomyopathy, unspecified (H)     [facility]; etiology? TROY, Vyvanse, other    Depressive disorder     GERD (gastroesophageal reflux diseae) 2011    Heart failure with reduced ejection fraction (H)     echo 2022; [facility]; EF 40-45%; mild hypokinesis LV; trace TR    Major depressive affective disorder, recurrent episode, unspecified 2011    Obesity  2011    Primary osteoarthritis of both hips     [facility]; planning total hip arthroplasty; goal weight first 250    Vitamin D deficiency 2015     Past Surgical History:   Procedure Laterality Date    ABDOMEN SURGERY  2005    I have had 2 c sections    ANGIOGRAM  2020    LHC - normal RCA/LCX; LAD luminal irregularities; Dr Hair[facility]; plan c[facility]    bilateral tubal ligation       section  ,        COLONOSCOPY N/A 2022    Procedure: Colonoscopy with polypectomy;  Surgeon: Jovanni Brown MD;  Location: [facility]    COLONOSCOPY      ENT SURGERY      Got my tonsils out    GYN SURGERY  2005        oligohydramnios      ORTHOPEDIC SURGERY  22 & 3-28-22    Total hip replacement left and right    Tonsillectomy      TOTAL HIP ARTHROPLASTY Right 2022    Dr De Leon    TOTAL HIP ARTHROPLASTY Left 2022    Dr De Leon    VASCULAR SURGERY  2022    Cath of both left and right side         Review of Systems  Constitutional, HEENT, cardiovascular, pulmonary, gi and gu systems are negative, except as otherwise noted.     Objective    Exam  /80 (BP Location: Right arm, Patient Position: Sitting, Cuff Size: Adult Large)   Pulse 83   Temp 97.2  F (36.2  C) (Tympanic)   Resp 20   Ht 1.715 m (5' 7.52\")   Wt (!) 137.7 kg (303 lb 8 oz)   SpO2 97%   BMI 46.81 kg/m     Estimated body mass index is 46.81 kg/m  as calculated from the following:    Height as of this encounter: 1.715 m (5' 7.52\").    Weight as of " this encounter: 137.7 kg (303 lb 8 oz).    Physical Exam  GENERAL: alert, no distress, and obese  EYES: Eyes grossly normal to inspection, PERRL and conjunctivae and sclerae normal  HENT: ear canals and TM's normal, nose and mouth without ulcers or lesions  NECK: no adenopathy, no asymmetry, masses, or scars  RESP: lungs clear to auscultation - no rales, rhonchi or wheezes  BREAST: normal without masses, tenderness or nipple discharge and no palpable axillary masses or adenopathy  CV: regular rate and rhythm, normal S1 S2, no S3 or S4, no murmur, click or rub, no peripheral edema  ABDOMEN: soft, nontender, no hepatosplenomegaly, no masses and bowel sounds normal  MS: no gross musculoskeletal defects noted, no edema  SKIN: no suspicious lesions or rashes  NEURO: Normal strength and tone, mentation intact and speech normal  PSYCH: mentation appears normal, affect normal/bright        Signed Electronically by: Aaliyah Sethi MD    Answers submitted by the patient for this visit:  Patient Health Questionnaire (Submitted on 6/4/2025)  If you checked off any problems, how difficult have these problems made it for you to do your work, take care of things at home, or get along with other people?: Somewhat difficult  PHQ9 TOTAL SCORE: 9

## 2025-06-04 ASSESSMENT — PATIENT HEALTH QUESTIONNAIRE - PHQ9
SUM OF ALL RESPONSES TO PHQ QUESTIONS 1-9: 9
SUM OF ALL RESPONSES TO PHQ QUESTIONS 1-9: 9
10. IF YOU CHECKED OFF ANY PROBLEMS, HOW DIFFICULT HAVE THESE PROBLEMS MADE IT FOR YOU TO DO YOUR WORK, TAKE CARE OF THINGS AT HOME, OR GET ALONG WITH OTHER PEOPLE: SOMEWHAT DIFFICULT

## 2025-06-04 NOTE — PATIENT INSTRUCTIONS
Call cardiology to reschedule.  Return for fasting labs.  Vaccines updated - COVID and last hep B.        Patient Education   Preventive Care Advice   This is general advice given by our system to help you stay healthy. However, your care team may have specific advice just for you. Please talk to your care team about your preventive care needs.  Nutrition  Eat 5 or more servings of fruits and vegetables each day.  Try wheat bread, brown rice and whole grain pasta (instead of white bread, rice, and pasta).  Get enough calcium and vitamin D. Check the label on foods and aim for 100% of the RDA (recommended daily allowance).  Lifestyle  Exercise at least 150 minutes each week  (30 minutes a day, 5 days a week).  Do muscle strengthening activities 2 days a week. These help control your weight and prevent disease.  No smoking.  Wear sunscreen to prevent skin cancer.  Have a dental exam and cleaning every 6 months.  Yearly exams  See your health care team every year to talk about:  Any changes in your health.  Any medicines your care team has prescribed.  Preventive care, family planning, and ways to prevent chronic diseases.  Shots (vaccines)   HPV shots (up to age 26), if you've never had them before.  Hepatitis B shots (up to age 59), if you've never had them before.  COVID-19 shot: Get this shot when it's due.  Flu shot: Get a flu shot every year.  Tetanus shot: Get a tetanus shot every 10 years.  Pneumococcal, hepatitis A, and RSV shots: Ask your care team if you need these based on your risk.  Shingles shot (for age 50 and up)  General health tests  Diabetes screening:  Starting at age 35, Get screened for diabetes at least every 3 years.  If you are younger than age 35, ask your care team if you should be screened for diabetes.  Cholesterol test: At age 39, start having a cholesterol test every 5 years, or more often if advised.  Bone density scan (DEXA): At age 50, ask your care team if you should have this scan  for osteoporosis (brittle bones).  Hepatitis C: Get tested at least once in your life.  STIs (sexually transmitted infections)  Before age 24: Ask your care team if you should be screened for STIs.  After age 24: Get screened for STIs if you're at risk. You are at risk for STIs (including HIV) if:  You are sexually active with more than one person.  You don't use condoms every time.  You or a partner was diagnosed with a sexually transmitted infection.  If you are at risk for HIV, ask about PrEP medicine to prevent HIV.  Get tested for HIV at least once in your life, whether you are at risk for HIV or not.  Cancer screening tests  Cervical cancer screening: If you have a cervix, begin getting regular cervical cancer screening tests starting at age 21.  Breast cancer scan (mammogram): If you've ever had breasts, begin having regular mammograms starting at age 40. This is a scan to check for breast cancer.  Colon cancer screening: It is important to start screening for colon cancer at age 45.  Have a colonoscopy test every 10 years (or more often if you're at risk) Or, ask your provider about stool tests like a FIT test every year or Cologuard test every 3 years.  To learn more about your testing options, visit:   .  For help making a decision, visit:   https://bit.ly/fd89839.  Prostate cancer screening test: If you have a prostate, ask your care team if a prostate cancer screening test (PSA) at age 55 is right for you.  Lung cancer screening: If you are a current or former smoker ages 50 to 80, ask your care team if ongoing lung cancer screenings are right for you.  For informational purposes only. Not to replace the advice of your health care provider. Copyright   2023 [employer]. All rights reserved. Clinically reviewed by the [facility] Transitions Program. [employer] [id number] - REV 01/24.  Learning About Stress  What is stress?     Stress is your body's response to a hard situation. Your body  can have a physical, emotional, or mental response. Stress is a fact of life for most people, and it affects everyone differently. What causes stress for you may not be stressful for someone else.  A lot of things can cause stress. You may feel stress when you go on a job interview, take a test, or run a race. This kind of short-term stress is normal and even useful. It can help you if you need to work hard or react quickly. For example, stress can help you finish an important job on time.  Long-term stress is caused by ongoing stressful situations or events. Examples of long-term stress include long-term health problems, ongoing problems at work, or conflicts in your family. Long-term stress can harm your health.  How does stress affect your health?  When you are stressed, your body responds as though you are in danger. It makes hormones that speed up your heart, make you breathe faster, and give you a burst of energy. This is called the fight-or-flight stress response. If the stress is over quickly, your body goes back to normal and no harm is done.  But if stress happens too often or lasts too long, it can have bad effects. Long-term stress can make you more likely to get sick, and it can make symptoms of some diseases worse. If you tense up when you are stressed, you may develop neck, shoulder, or low back pain. Stress is linked to high blood pressure and heart disease.  Stress also harms your emotional health. It can make you hammond, tense, or depressed. Your relationships may suffer, and you may not do well at work or school.  What can you do to manage stress?  You can try these things to help manage stress:   Do something active. Exercise or activity can help reduce stress. Walking is a great way to get started. Even everyday activities such as housecleaning or yard work can help.  Try yoga or sasha chi. These techniques combine exercise and meditation. You may need some training at first to learn them.  Do  "something you enjoy. For example, listen to music or go to a movie. Practice your hobby or do volunteer work.  Meditate. This can help you relax, because you are not worrying about what happened before or what may happen in the future.  Do guided imagery. Imagine yourself in any setting that helps you feel calm. You can use online videos, books, or a teacher to guide you.  Do breathing exercises. For example:  From a standing position, bend forward from the waist with your knees slightly bent. Let your arms dangle close to the floor.  Breathe in slowly and deeply as you return to a standing position. Roll up slowly and lift your head last.  Hold your breath for just a few seconds in the standing position.  Breathe out slowly and bend forward from the waist.  Let your feelings out. Talk, laugh, cry, and express anger when you need to. Talking with supportive friends or family, a counselor, or a [employer] leader about your feelings is a healthy way to relieve stress. Avoid discussing your feelings with people who make you feel worse.  Write. It may help to write about things that are bothering you. This helps you find out how much stress you feel and what is causing it. When you know this, you can find better ways to cope.  What can you do to prevent stress?  You might try some of these things to help prevent stress:  Manage your time. This helps you find time to do the things you want and need to do.  Get enough sleep. Your body recovers from the stresses of the day while you are sleeping.  Get support. Your family, friends, and community can make a difference in how you experience stress.  Limit your news feed. Avoid or limit time on social media or news that may make you feel stressed.  Do something active. Exercise or activity can help reduce stress. Walking is a great way to get started.  Where can you learn more?  Go to https://www.healthwise.net/patiented  Enter N032 in the search box to learn more about \"Learning " "About Stress.\"  Current as of: October 24, 2024  Content Version: 14.4    8464-9312 [employer].   Care instructions adapted under license by your healthcare professional. If you have questions about a medical condition or this instruction, always ask your healthcare professional. [employer] disclaims any warranty or liability for your use of this information.       "

## 2025-06-05 ENCOUNTER — OFFICE VISIT (OUTPATIENT)
Dept: FAMILY MEDICINE | Facility: OTHER | Age: 50
End: 2025-06-05
Attending: FAMILY MEDICINE
Payer: COMMERCIAL

## 2025-06-05 ENCOUNTER — ANCILLARY PROCEDURE (OUTPATIENT)
Dept: MAMMOGRAPHY | Facility: OTHER | Age: 50
End: 2025-06-05
Attending: FAMILY MEDICINE
Payer: COMMERCIAL

## 2025-06-05 VITALS
BODY MASS INDEX: 44.41 KG/M2 | WEIGHT: 293 LBS | TEMPERATURE: 97.2 F | SYSTOLIC BLOOD PRESSURE: 118 MMHG | RESPIRATION RATE: 20 BRPM | OXYGEN SATURATION: 97 % | DIASTOLIC BLOOD PRESSURE: 80 MMHG | HEIGHT: 68 IN | HEART RATE: 83 BPM

## 2025-06-05 DIAGNOSIS — Z13.220 LIPID SCREENING: ICD-10-CM

## 2025-06-05 DIAGNOSIS — F33.9 EPISODE OF RECURRENT MAJOR DEPRESSIVE DISORDER, UNSPECIFIED DEPRESSION EPISODE SEVERITY: ICD-10-CM

## 2025-06-05 DIAGNOSIS — F33.1 MODERATE EPISODE OF RECURRENT MAJOR DEPRESSIVE DISORDER (H): ICD-10-CM

## 2025-06-05 DIAGNOSIS — Z12.31 ENCOUNTER FOR SCREENING MAMMOGRAM FOR BREAST CANCER: ICD-10-CM

## 2025-06-05 DIAGNOSIS — Z00.00 ROUTINE GENERAL MEDICAL EXAMINATION AT A HEALTH CARE FACILITY: Primary | ICD-10-CM

## 2025-06-05 DIAGNOSIS — I10 BENIGN ESSENTIAL HYPERTENSION: ICD-10-CM

## 2025-06-05 DIAGNOSIS — E55.9 VITAMIN D DEFICIENCY: ICD-10-CM

## 2025-06-05 DIAGNOSIS — I42.9 CARDIOMYOPATHY, UNSPECIFIED TYPE (H): ICD-10-CM

## 2025-06-05 DIAGNOSIS — E88.810 INSULIN RESISTANCE SYNDROME: ICD-10-CM

## 2025-06-05 DIAGNOSIS — G47.33 OBSTRUCTIVE SLEEP APNEA SYNDROME: ICD-10-CM

## 2025-06-05 DIAGNOSIS — I50.20 HEART FAILURE WITH REDUCED EJECTION FRACTION (H): ICD-10-CM

## 2025-06-05 PROCEDURE — 77063 BREAST TOMOSYNTHESIS BI: CPT | Performed by: RADIOLOGY

## 2025-06-05 PROCEDURE — 77067 SCR MAMMO BI INCL CAD: CPT | Performed by: RADIOLOGY

## 2025-06-05 RX ORDER — PREGABALIN 100 MG/1
100 CAPSULE ORAL 3 TIMES DAILY
COMMUNITY

## 2025-06-05 RX ORDER — MAGNESIUM 200 MG
1 TABLET ORAL DAILY
COMMUNITY

## 2025-06-05 RX ORDER — MELOXICAM 15 MG/1
15 TABLET ORAL DAILY
COMMUNITY

## 2025-06-05 ASSESSMENT — PAIN SCALES - GENERAL: PAINLEVEL_OUTOF10: SEVERE PAIN (7)

## 2025-06-06 ENCOUNTER — LAB (OUTPATIENT)
Dept: LAB | Facility: OTHER | Age: 50
End: 2025-06-06
Payer: COMMERCIAL

## 2025-06-06 DIAGNOSIS — E88.810 INSULIN RESISTANCE SYNDROME: ICD-10-CM

## 2025-06-06 DIAGNOSIS — Z00.00 ROUTINE GENERAL MEDICAL EXAMINATION AT A HEALTH CARE FACILITY: ICD-10-CM

## 2025-06-06 DIAGNOSIS — I10 BENIGN ESSENTIAL HYPERTENSION: ICD-10-CM

## 2025-06-06 DIAGNOSIS — Z13.220 LIPID SCREENING: ICD-10-CM

## 2025-06-06 LAB
ALBUMIN SERPL BCG-MCNC: 4 G/DL (ref 3.5–5.2)
ALP SERPL-CCNC: 54 U/L (ref 40–150)
ALT SERPL W P-5'-P-CCNC: 16 U/L (ref 0–50)
ANION GAP SERPL CALCULATED.3IONS-SCNC: 11 MMOL/L (ref 7–15)
AST SERPL W P-5'-P-CCNC: 15 U/L (ref 0–45)
BASOPHILS # BLD AUTO: 0.1 10E3/UL (ref 0–0.2)
BASOPHILS NFR BLD AUTO: 1 %
BILIRUB SERPL-MCNC: 0.3 MG/DL
BUN SERPL-MCNC: 11.3 MG/DL (ref 6–20)
CALCIUM SERPL-MCNC: 9 MG/DL (ref 8.8–10.4)
CHLORIDE SERPL-SCNC: 103 MMOL/L (ref 98–107)
CHOLEST SERPL-MCNC: 149 MG/DL
CREAT SERPL-MCNC: 0.71 MG/DL (ref 0.51–0.95)
EGFRCR SERPLBLD CKD-EPI 2021: >90 ML/MIN/1.73M2
EOSINOPHIL # BLD AUTO: 0.3 10E3/UL (ref 0–0.7)
EOSINOPHIL NFR BLD AUTO: 3 %
ERYTHROCYTE [DISTWIDTH] IN BLOOD BY AUTOMATED COUNT: 13.3 % (ref 10–15)
EST. AVERAGE GLUCOSE BLD GHB EST-MCNC: 103 MG/DL
FASTING STATUS PATIENT QL REPORTED: YES
FASTING STATUS PATIENT QL REPORTED: YES
GLUCOSE SERPL-MCNC: 83 MG/DL (ref 70–99)
HBA1C MFR BLD: 5.2 %
HCO3 SERPL-SCNC: 24 MMOL/L (ref 22–29)
HCT VFR BLD AUTO: 40.9 % (ref 35–47)
HDLC SERPL-MCNC: 39 MG/DL
HGB BLD-MCNC: 13.2 G/DL (ref 11.7–15.7)
IMM GRANULOCYTES # BLD: 0.1 10E3/UL
IMM GRANULOCYTES NFR BLD: 1 %
INSULIN SERPL-ACNC: 13.9 UU/ML (ref 2.6–24.9)
IRON BINDING CAPACITY (ROCHE): 310 UG/DL (ref 240–430)
IRON SATN MFR SERPL: 29 % (ref 15–46)
IRON SERPL-MCNC: 89 UG/DL (ref 37–145)
LDLC SERPL CALC-MCNC: 94 MG/DL
LYMPHOCYTES # BLD AUTO: 3 10E3/UL (ref 0.8–5.3)
LYMPHOCYTES NFR BLD AUTO: 33 %
MCH RBC QN AUTO: 29.1 PG (ref 26.5–33)
MCHC RBC AUTO-ENTMCNC: 32.3 G/DL (ref 31.5–36.5)
MCV RBC AUTO: 90 FL (ref 78–100)
MONOCYTES # BLD AUTO: 0.6 10E3/UL (ref 0–1.3)
MONOCYTES NFR BLD AUTO: 7 %
NEUTROPHILS # BLD AUTO: 5.1 10E3/UL (ref 1.6–8.3)
NEUTROPHILS NFR BLD AUTO: 55 %
NONHDLC SERPL-MCNC: 110 MG/DL
NRBC # BLD AUTO: 0 10E3/UL
NRBC BLD AUTO-RTO: 0 /100
PLATELET # BLD AUTO: 372 10E3/UL (ref 150–450)
POTASSIUM SERPL-SCNC: 4.3 MMOL/L (ref 3.4–5.3)
PROT SERPL-MCNC: 7.4 G/DL (ref 6.4–8.3)
RBC # BLD AUTO: 4.53 10E6/UL (ref 3.8–5.2)
SODIUM SERPL-SCNC: 138 MMOL/L (ref 135–145)
TRIGL SERPL-MCNC: 78 MG/DL
VIT B12 SERPL-MCNC: 461 PG/ML (ref 232–1245)
VIT D+METAB SERPL-MCNC: 30 NG/ML (ref 20–50)
WBC # BLD AUTO: 9.2 10E3/UL (ref 4–11)

## 2025-06-06 PROCEDURE — 83525 ASSAY OF INSULIN: CPT

## 2025-06-06 PROCEDURE — 36415 COLL VENOUS BLD VENIPUNCTURE: CPT

## 2025-06-06 PROCEDURE — 82306 VITAMIN D 25 HYDROXY: CPT

## 2025-06-06 PROCEDURE — 82607 VITAMIN B-12: CPT

## 2025-06-06 PROCEDURE — 83540 ASSAY OF IRON: CPT

## 2025-06-06 PROCEDURE — 83036 HEMOGLOBIN GLYCOSYLATED A1C: CPT

## 2025-06-06 PROCEDURE — 80061 LIPID PANEL: CPT

## 2025-06-06 PROCEDURE — 85025 COMPLETE CBC W/AUTO DIFF WBC: CPT

## 2025-06-06 PROCEDURE — 83550 IRON BINDING TEST: CPT

## 2025-06-06 PROCEDURE — 80053 COMPREHEN METABOLIC PANEL: CPT

## (undated) DEVICE — CONNECTOR ERBEFLO 2 PORT 20325-215

## (undated) DEVICE — ENDO TRAP POLYP E-TRAP 00711099

## (undated) DEVICE — TUBING SUCTION 20FT N620A

## (undated) DEVICE — ENDO SNARE EXACTO COLD 9MM LOOP 2.4MMX230CM 00711115

## (undated) DEVICE — SOL WATER IRRIG 1000ML BOTTLE 2F7114

## (undated) RX ORDER — PROPOFOL 10 MG/ML
INJECTION, EMULSION INTRAVENOUS
Status: DISPENSED
Start: 2022-12-02